# Patient Record
Sex: FEMALE | Race: OTHER | Employment: UNEMPLOYED | URBAN - METROPOLITAN AREA
[De-identification: names, ages, dates, MRNs, and addresses within clinical notes are randomized per-mention and may not be internally consistent; named-entity substitution may affect disease eponyms.]

---

## 2017-05-20 ENCOUNTER — HOSPITAL ENCOUNTER (OUTPATIENT)
Age: 57
Discharge: HOME | End: 2017-05-20

## 2017-05-20 DIAGNOSIS — I12.9 BENIGN HYPERTENSION WITH CKD (CHRONIC KIDNEY DISEASE) STAGE IV (HCC): ICD-10-CM

## 2017-05-20 DIAGNOSIS — N18.4 SECONDARY DIABETES MELLITUS WITH STAGE 4 CHRONIC KIDNEY DISEASE (HCC): ICD-10-CM

## 2017-05-20 DIAGNOSIS — E13.22 SECONDARY DIABETES MELLITUS WITH STAGE 4 CHRONIC KIDNEY DISEASE (HCC): ICD-10-CM

## 2017-05-20 DIAGNOSIS — N18.4 BENIGN HYPERTENSION WITH CKD (CHRONIC KIDNEY DISEASE) STAGE IV (HCC): ICD-10-CM

## 2017-05-20 DIAGNOSIS — N18.4 CKD (CHRONIC KIDNEY DISEASE) STAGE 4, GFR 15-29 ML/MIN (HCC): ICD-10-CM

## 2017-05-20 DIAGNOSIS — R82.998 LEUKOCYTES IN URINE: ICD-10-CM

## 2017-05-20 LAB
-: ABNORMAL
ABSOLUTE EOS #: 0.2 K/UL (ref 0–0.4)
ABSOLUTE LYMPH #: 4 K/UL (ref 1–4.8)
ABSOLUTE MONO #: 0.8 K/UL (ref 0.1–1.3)
AMORPHOUS: ABNORMAL
ANION GAP SERPL CALCULATED.3IONS-SCNC: 16 MMOL/L (ref 9–17)
BACTERIA: ABNORMAL
BASOPHILS # BLD: 0 %
BASOPHILS ABSOLUTE: 0 K/UL (ref 0–0.2)
BILIRUBIN URINE: NEGATIVE
BUN BLDV-MCNC: 48 MG/DL (ref 6–20)
BUN/CREAT BLD: ABNORMAL (ref 9–20)
CALCIUM SERPL-MCNC: 8.6 MG/DL (ref 8.6–10.4)
CASTS UA: ABNORMAL /LPF
CHLORIDE BLD-SCNC: 104 MMOL/L (ref 98–107)
CO2: 19 MMOL/L (ref 20–31)
COLOR: YELLOW
COMMENT UA: ABNORMAL
CREAT SERPL-MCNC: 1.43 MG/DL (ref 0.5–0.9)
CRYSTALS, UA: ABNORMAL /HPF
DIFFERENTIAL TYPE: ABNORMAL
EOSINOPHILS RELATIVE PERCENT: 3 %
EPITHELIAL CELLS UA: ABNORMAL /HPF
GFR AFRICAN AMERICAN: 46 ML/MIN
GFR NON-AFRICAN AMERICAN: 38 ML/MIN
GFR SERPL CREATININE-BSD FRML MDRD: ABNORMAL ML/MIN/{1.73_M2}
GFR SERPL CREATININE-BSD FRML MDRD: ABNORMAL ML/MIN/{1.73_M2}
GLUCOSE BLD-MCNC: 122 MG/DL (ref 70–99)
GLUCOSE URINE: NEGATIVE
HCT VFR BLD CALC: 36.8 % (ref 36–46)
HEMOGLOBIN: 11.9 G/DL (ref 12–16)
KETONES, URINE: NEGATIVE
LEUKOCYTE ESTERASE, URINE: NEGATIVE
LYMPHOCYTES # BLD: 55 %
MAGNESIUM: 1.4 MG/DL (ref 1.6–2.6)
MCH RBC QN AUTO: 28.4 PG (ref 26–34)
MCHC RBC AUTO-ENTMCNC: 32.4 G/DL (ref 31–37)
MCV RBC AUTO: 87.9 FL (ref 80–100)
MONOCYTES # BLD: 11 %
MUCUS: ABNORMAL
NITRITE, URINE: NEGATIVE
OTHER OBSERVATIONS UA: ABNORMAL
PDW BLD-RTO: 14.3 % (ref 11.5–14.9)
PH UA: 5.5 (ref 5–8)
PHOSPHORUS: 4.8 MG/DL (ref 2.6–4.5)
PLATELET # BLD: 302 K/UL (ref 150–450)
PLATELET ESTIMATE: ABNORMAL
PMV BLD AUTO: 9 FL (ref 6–12)
POTASSIUM SERPL-SCNC: 4.3 MMOL/L (ref 3.7–5.3)
PROTEIN UA: ABNORMAL
RBC # BLD: 4.19 M/UL (ref 4–5.2)
RBC # BLD: ABNORMAL 10*6/UL
RBC UA: ABNORMAL /HPF
RENAL EPITHELIAL, UA: ABNORMAL /HPF
SEG NEUTROPHILS: 31 %
SEGMENTED NEUTROPHILS ABSOLUTE COUNT: 2.3 K/UL (ref 1.3–9.1)
SODIUM BLD-SCNC: 139 MMOL/L (ref 135–144)
SPECIFIC GRAVITY UA: 1.01 (ref 1–1.03)
TRICHOMONAS: ABNORMAL
TURBIDITY: CLEAR
URINE HGB: NEGATIVE
UROBILINOGEN, URINE: NORMAL
WBC # BLD: 7.5 K/UL (ref 3.5–11)
WBC # BLD: ABNORMAL 10*3/UL
WBC UA: ABNORMAL /HPF
YEAST: ABNORMAL

## 2017-05-20 PROCEDURE — 85025 COMPLETE CBC W/AUTO DIFF WBC: CPT

## 2017-05-20 PROCEDURE — 83735 ASSAY OF MAGNESIUM: CPT

## 2017-05-20 PROCEDURE — 87086 URINE CULTURE/COLONY COUNT: CPT

## 2017-05-20 PROCEDURE — 84100 ASSAY OF PHOSPHORUS: CPT

## 2017-05-20 PROCEDURE — 81001 URINALYSIS AUTO W/SCOPE: CPT

## 2017-05-20 PROCEDURE — 36415 COLL VENOUS BLD VENIPUNCTURE: CPT

## 2017-05-20 PROCEDURE — 80048 BASIC METABOLIC PNL TOTAL CA: CPT

## 2017-05-21 LAB
CULTURE: NORMAL
CULTURE: NORMAL
Lab: NORMAL
SPECIMEN DESCRIPTION: NORMAL
SPECIMEN DESCRIPTION: NORMAL
STATUS: NORMAL

## 2017-08-04 ENCOUNTER — HOSPITAL ENCOUNTER (OUTPATIENT)
Age: 57
Discharge: HOME | End: 2017-08-04
Payer: COMMERCIAL

## 2017-08-04 DIAGNOSIS — E55.9 VITAMIN D DEFICIENCY: ICD-10-CM

## 2017-08-04 LAB — VITAMIN D 25-HYDROXY: 74.1 NG/ML (ref 30–100)

## 2017-08-04 PROCEDURE — 36415 COLL VENOUS BLD VENIPUNCTURE: CPT

## 2017-08-04 PROCEDURE — 82306 VITAMIN D 25 HYDROXY: CPT

## 2017-11-19 ENCOUNTER — HOSPITAL ENCOUNTER (EMERGENCY)
Age: 57
Discharge: HOME | End: 2017-11-19
Attending: EMERGENCY MEDICINE
Payer: COMMERCIAL

## 2017-11-19 VITALS
WEIGHT: 130 LBS | HEIGHT: 63 IN | RESPIRATION RATE: 21 BRPM | OXYGEN SATURATION: 94 % | HEART RATE: 82 BPM | DIASTOLIC BLOOD PRESSURE: 70 MMHG | BODY MASS INDEX: 23.04 KG/M2 | SYSTOLIC BLOOD PRESSURE: 147 MMHG | TEMPERATURE: 97.7 F

## 2017-11-19 DIAGNOSIS — R73.9 HYPERGLYCEMIA: ICD-10-CM

## 2017-11-19 DIAGNOSIS — E87.5 HYPERKALEMIA: Primary | ICD-10-CM

## 2017-11-19 LAB
ANION GAP SERPL CALCULATED.3IONS-SCNC: 13 MMOL/L (ref 9–17)
BUN BLDV-MCNC: 57 MG/DL (ref 6–20)
BUN/CREAT BLD: ABNORMAL (ref 9–20)
CALCIUM SERPL-MCNC: 8.8 MG/DL (ref 8.6–10.4)
CHLORIDE BLD-SCNC: 105 MMOL/L (ref 98–107)
CO2: 19 MMOL/L (ref 20–31)
CREAT SERPL-MCNC: 1.73 MG/DL (ref 0.5–0.9)
GFR AFRICAN AMERICAN: 37 ML/MIN
GFR NON-AFRICAN AMERICAN: 30 ML/MIN
GFR SERPL CREATININE-BSD FRML MDRD: ABNORMAL ML/MIN/{1.73_M2}
GFR SERPL CREATININE-BSD FRML MDRD: ABNORMAL ML/MIN/{1.73_M2}
GLUCOSE BLD-MCNC: 295 MG/DL (ref 70–99)
POTASSIUM SERPL-SCNC: 5.7 MMOL/L (ref 3.7–5.3)
SODIUM BLD-SCNC: 137 MMOL/L (ref 135–144)

## 2017-11-19 PROCEDURE — 36415 COLL VENOUS BLD VENIPUNCTURE: CPT

## 2017-11-19 PROCEDURE — 2580000003 HC RX 258: Performed by: EMERGENCY MEDICINE

## 2017-11-19 PROCEDURE — 80048 BASIC METABOLIC PNL TOTAL CA: CPT

## 2017-11-19 PROCEDURE — 6370000000 HC RX 637 (ALT 250 FOR IP): Performed by: EMERGENCY MEDICINE

## 2017-11-19 PROCEDURE — 99285 EMERGENCY DEPT VISIT HI MDM: CPT

## 2017-11-19 PROCEDURE — 96374 THER/PROPH/DIAG INJ IV PUSH: CPT

## 2017-11-19 RX ORDER — DEXTROSE MONOHYDRATE 25 G/50ML
25 INJECTION, SOLUTION INTRAVENOUS ONCE
Status: COMPLETED | OUTPATIENT
Start: 2017-11-19 | End: 2017-11-19

## 2017-11-19 RX ORDER — DEXTROSE, SODIUM CHLORIDE, AND POTASSIUM CHLORIDE 5; .45; .15 G/100ML; G/100ML; G/100ML
INJECTION INTRAVENOUS ONCE
Status: DISCONTINUED | OUTPATIENT
Start: 2017-11-19 | End: 2017-11-19

## 2017-11-19 RX ORDER — SODIUM POLYSTYRENE SULFONATE 15 G/60ML
15 SUSPENSION ORAL; RECTAL ONCE
Status: COMPLETED | OUTPATIENT
Start: 2017-11-19 | End: 2017-11-19

## 2017-11-19 RX ADMIN — INSULIN HUMAN 10 UNITS: 100 INJECTION, SOLUTION PARENTERAL at 14:50

## 2017-11-19 RX ADMIN — DEXTROSE MONOHYDRATE 25 G: 25 INJECTION, SOLUTION INTRAVENOUS at 14:51

## 2017-11-19 RX ADMIN — SODIUM POLYSTYRENE SULFONATE 15 G: 15 SUSPENSION ORAL; RECTAL at 14:56

## 2017-11-19 ASSESSMENT — ENCOUNTER SYMPTOMS
COLOR CHANGE: 0
EYE REDNESS: 0
RHINORRHEA: 0
VOMITING: 0
BACK PAIN: 0
WHEEZING: 0
TROUBLE SWALLOWING: 0
SHORTNESS OF BREATH: 0
SORE THROAT: 0
ABDOMINAL PAIN: 0
SINUS PRESSURE: 0
COUGH: 0
CONSTIPATION: 0
EYE PAIN: 0
FACIAL SWELLING: 0
CHEST TIGHTNESS: 0
DIARRHEA: 0
EYE DISCHARGE: 0
NAUSEA: 0
BLOOD IN STOOL: 0

## 2017-11-19 NOTE — ED NOTES
Dr. Joe Horne notified labs are back for pt. tx for hyperkalemia will be administered and no EKG will need to be done at this time.       Sherrell Moseley RN  11/19/17 4337

## 2017-11-19 NOTE — ED PROVIDER NOTES
[address] ED  eMERGENCY dEPARTMENT eNCOUnter      Pt Name: Arleta Kehr  MRN: [id number]  [place] 1960  Date of evaluation: 11/19/17      CHIEF COMPLAINT       Chief Complaint   Patient presents with    Abnormal Lab         HISTORY OF PRESENT ILLNESS    Arlene Robbins is a 61 y.o. female who presents complaining of Needing blood work. Patient speaks Estonian and signed in stating that she had high potassium. Patient was brought back to the room and we got the  on the phone. After talking through the  it sounds like the patient was here just to get blood work drawn per her nephrologist for an upcoming appointment. Patient has a history of high potassium and some acute renal failure in the past.  Patient currently states that she's having no symptoms. REVIEW OF SYSTEMS       Review of Systems   Constitutional: Negative for activity change, appetite change, chills, diaphoresis and fever. HENT: Negative for congestion, ear pain, facial swelling, nosebleeds, rhinorrhea, sinus pressure, sore throat and trouble swallowing. Eyes: Negative for pain, discharge and redness. Respiratory: Negative for cough, chest tightness, shortness of breath and wheezing. Cardiovascular: Negative for chest pain, palpitations and leg swelling. Gastrointestinal: Negative for abdominal pain, blood in stool, constipation, diarrhea, nausea and vomiting. Genitourinary: Negative for difficulty urinating, dysuria, flank pain, frequency, genital sores and hematuria. Musculoskeletal: Negative for arthralgias, back pain, gait problem, joint swelling, myalgias and neck pain. Skin: Negative for color change, pallor, rash and wound. Neurological: Negative for dizziness, tremors, seizures, syncope, speech difficulty, weakness, numbness and headaches.    Psychiatric/Behavioral: Negative for confusion, decreased concentration, hallucinations, self-injury, sleep disturbance and suicidal ideas. PAST MEDICAL HISTORY     Past Medical History:   Diagnosis Date    CAD (coronary artery disease)     [facility]congest[facility] failure) [facility])     CKD (chronic kidney disease) stage 3, GFR 30-59 ml/min     Diabetes mellitus ([facility])     Hyperkalemia     Hypertension     Microscopic hematuria     Proteinuria     Renal failure     Status post coronary artery bypass grafting 2010    Type II or unspecified type diabetes mellitus without mention of complication, not stated as uncontrolled     UTI (urinary tract infection)        SURGICAL HISTORY       Past Surgical History:   Procedure Laterality Date    CARDIAC SURGERY      CHOLECYSTECTOMY      CORONARY ARTERY BYPASS GRAFT         CURRENT MEDICATIONS       Current Discharge Medication List      CONTINUE these medications which have NOT CHANGED    Details   ferrous sulfate 325 (65 Fe) MG tablet Take 325 mg by mouth daily (with breakfast)      ramipril (ALTACE) 5 MG capsule Take 5 mg by mouth daily      carvedilol (COREG) 3.125 MG tablet Take 1 tablet by mouth 2 times daily  Qty: 60 tablet, Refills: 3      magnesium oxide (MAG-OX) 400 (240 MG) MG tablet Take 1 tablet by mouth daily  Qty: 30 tablet, Refills: 6      sodium polystyrene (KIONEX) 15 GM/60ML suspension TAKE 120MLS BY MOUTH EVERY 30 DAYS  Qty: 120 mL, Refills: 3      pravastatin (PRAVACHOL) 20 MG tablet Take 20 mg by mouth nightly      insulin glargine (LANTUS) 100 UNIT/ML injection vial Inject 30 Units into the skin 2 times daily       vitamin D (CHOLECALCIFEROL) 400 UNITS TABS tablet Take 400 Units by mouth daily      metoclopramide (REGLAN) 5 MG tablet   Take 5 mg by mouth 2 times daily (with meals)       glucose blood VI test strips (ASCENSIA AUTODISC VI;ONE TOUCH ULTRA TEST VI) strip As needed. Qty: 102 strip, Refills: 11    Comments: Accu-Chk compact STP      polyethylene glycol (MIRALAX) powder Take 17 g by mouth daily.   Qty: 30 Bottle, Refills: 3    Associated Diagnoses: CKD (chronic kidney disease) stage 3, GFR 30-59 ml/min      sennosides-docusate sodium (SENOKOT-S) 8.6-50 MG tablet Take 1 tablet by mouth daily. aspirin 81 MG EC tablet Take 81 mg by mouth daily. ALLERGIES     has No Known Allergies. SOCIAL HISTORY      reports that she has never smoked. She does not have any smokeless tobacco history on file. She reports that she does not drink alcohol or use drugs. PHYSICAL EXAM     INITIAL VITALS: BP (!) 155/61   Pulse 76   Temp 97.7 °F (36.5 °C) (Oral)   Resp 20   Ht 5' 3\" (1.6 m)   Wt 130 lb (59 kg)   SpO2 96%   BMI 23.03 kg/m²      Physical Exam   Constitutional: She is oriented to person, place, and time. She appears well-developed and well-nourished. No distress. HENT:   Head: Normocephalic and atraumatic. Eyes: Conjunctivae and EOM are normal. Pupils are equal, round, and reactive to light. Right eye exhibits no discharge. Left eye exhibits no discharge. No scleral icterus. Cardiovascular: Normal rate, regular rhythm and normal heart sounds. Exam reveals no gallop and no friction rub. No murmur heard. Pulmonary/Chest: Effort normal and breath sounds normal. No respiratory distress. She has no wheezes. She has no rales. She exhibits no tenderness. Abdominal: Soft. Bowel sounds are normal. She exhibits no distension and no mass. There is no tenderness. There is no rebound and no guarding. Musculoskeletal: Normal range of motion. She exhibits no edema or tenderness. Neurological: She is alert and oriented to person, place, and time. She displays normal reflexes. No cranial nerve deficit. She exhibits normal muscle tone. Coordination normal.   Skin: Skin is warm and dry. No rash noted. She is not diaphoretic. No erythema. No pallor. Psychiatric: She has a normal mood and affect. Her behavior is normal. Judgment and thought content normal.   Nursing note and vitals reviewed.       DIAGNOSTIC RESULTS     RADIOLOGY:All plain film,

## 2017-11-27 ENCOUNTER — HOSPITAL ENCOUNTER (OUTPATIENT)
Age: 57
Discharge: HOME | End: 2017-11-27
Payer: COMMERCIAL

## 2017-11-27 DIAGNOSIS — N18.30 BENIGN HYPERTENSION WITH CKD (CHRONIC KIDNEY DISEASE) STAGE III (HCC): ICD-10-CM

## 2017-11-27 DIAGNOSIS — E13.22 SECONDARY DIABETES MELLITUS WITH STAGE 3 CHRONIC KIDNEY DISEASE (HCC): ICD-10-CM

## 2017-11-27 DIAGNOSIS — R82.998 LEUKOCYTES IN URINE: ICD-10-CM

## 2017-11-27 DIAGNOSIS — N18.30 CKD (CHRONIC KIDNEY DISEASE) STAGE 3, GFR 30-59 ML/MIN (HCC): ICD-10-CM

## 2017-11-27 DIAGNOSIS — N18.30 SECONDARY DIABETES MELLITUS WITH STAGE 3 CHRONIC KIDNEY DISEASE (HCC): ICD-10-CM

## 2017-11-27 DIAGNOSIS — R80.9 PROTEINURIA, UNSPECIFIED TYPE: ICD-10-CM

## 2017-11-27 DIAGNOSIS — I12.9 BENIGN HYPERTENSION WITH CKD (CHRONIC KIDNEY DISEASE) STAGE III (HCC): ICD-10-CM

## 2017-11-27 LAB
-: ABNORMAL
ABSOLUTE EOS #: 0.29 K/UL (ref 0–0.4)
ABSOLUTE IMMATURE GRANULOCYTE: ABNORMAL K/UL (ref 0–0.3)
ABSOLUTE LYMPH #: 2.55 K/UL (ref 1–4.8)
ABSOLUTE MONO #: 0.7 K/UL (ref 0.1–1.3)
AMORPHOUS: ABNORMAL
ANION GAP SERPL CALCULATED.3IONS-SCNC: 12 MMOL/L (ref 9–17)
ATYPICAL LYMPHOCYTE ABSOLUTE COUNT: 0.23 K/UL
ATYPICAL LYMPHOCYTES: 4 %
BACTERIA: ABNORMAL
BASOPHILS # BLD: 0 % (ref 0–2)
BASOPHILS ABSOLUTE: 0 K/UL (ref 0–0.2)
BUN BLDV-MCNC: 55 MG/DL (ref 6–20)
BUN/CREAT BLD: ABNORMAL (ref 9–20)
CALCIUM SERPL-MCNC: 9.1 MG/DL (ref 8.6–10.4)
CASTS UA: ABNORMAL /LPF
CHLORIDE BLD-SCNC: 106 MMOL/L (ref 98–107)
CO2: 24 MMOL/L (ref 20–31)
CREAT SERPL-MCNC: 1.9 MG/DL (ref 0.5–0.9)
CRYSTALS, UA: ABNORMAL /HPF
DIFFERENTIAL TYPE: ABNORMAL
EOSINOPHILS RELATIVE PERCENT: 5 % (ref 0–4)
EPITHELIAL CELLS UA: ABNORMAL /HPF
GFR AFRICAN AMERICAN: 33 ML/MIN
GFR NON-AFRICAN AMERICAN: 27 ML/MIN
GFR SERPL CREATININE-BSD FRML MDRD: ABNORMAL ML/MIN/{1.73_M2}
GFR SERPL CREATININE-BSD FRML MDRD: ABNORMAL ML/MIN/{1.73_M2}
GLUCOSE BLD-MCNC: 159 MG/DL (ref 70–99)
HCT VFR BLD CALC: 38 % (ref 36–46)
HEMOGLOBIN: 12.6 G/DL (ref 12–16)
IMMATURE GRANULOCYTES: ABNORMAL %
LYMPHOCYTES # BLD: 44 % (ref 24–44)
MAGNESIUM: 2.2 MG/DL (ref 1.6–2.6)
MCH RBC QN AUTO: 29.4 PG (ref 26–34)
MCHC RBC AUTO-ENTMCNC: 33 G/DL (ref 31–37)
MCV RBC AUTO: 89.1 FL (ref 80–100)
MONOCYTES # BLD: 12 % (ref 1–7)
MORPHOLOGY: NORMAL
MUCUS: ABNORMAL
OTHER OBSERVATIONS UA: ABNORMAL
PDW BLD-RTO: 13.3 % (ref 11.5–14.9)
PHOSPHORUS: 5 MG/DL (ref 2.6–4.5)
PLATELET # BLD: 266 K/UL (ref 150–450)
PLATELET ESTIMATE: ABNORMAL
PMV BLD AUTO: 9.2 FL (ref 6–12)
POTASSIUM SERPL-SCNC: 5 MMOL/L (ref 3.7–5.3)
RBC # BLD: 4.27 M/UL (ref 4–5.2)
RBC # BLD: ABNORMAL 10*6/UL
RBC UA: ABNORMAL /HPF
RENAL EPITHELIAL, UA: ABNORMAL /HPF
SEG NEUTROPHILS: 35 % (ref 36–66)
SEGMENTED NEUTROPHILS ABSOLUTE COUNT: 2.03 K/UL (ref 1.3–9.1)
SODIUM BLD-SCNC: 142 MMOL/L (ref 135–144)
TRICHOMONAS: ABNORMAL
WBC # BLD: 5.8 K/UL (ref 3.5–11)
WBC # BLD: ABNORMAL 10*3/UL
WBC UA: ABNORMAL /HPF
YEAST: ABNORMAL

## 2017-11-27 PROCEDURE — 83735 ASSAY OF MAGNESIUM: CPT

## 2017-11-27 PROCEDURE — 85025 COMPLETE CBC W/AUTO DIFF WBC: CPT

## 2017-11-27 PROCEDURE — 84100 ASSAY OF PHOSPHORUS: CPT

## 2017-11-27 PROCEDURE — 80048 BASIC METABOLIC PNL TOTAL CA: CPT

## 2017-11-27 PROCEDURE — 81001 URINALYSIS AUTO W/SCOPE: CPT

## 2017-11-27 PROCEDURE — 87086 URINE CULTURE/COLONY COUNT: CPT

## 2017-11-27 PROCEDURE — 82306 VITAMIN D 25 HYDROXY: CPT

## 2017-11-27 PROCEDURE — 36415 COLL VENOUS BLD VENIPUNCTURE: CPT

## 2017-11-28 LAB
CULTURE: NO GROWTH
CULTURE: NORMAL
Lab: NORMAL
SPECIMEN DESCRIPTION: NORMAL
SPECIMEN DESCRIPTION: NORMAL
STATUS: NORMAL
VITAMIN D 25-HYDROXY: 93 NG/ML (ref 30–100)

## 2018-02-26 ENCOUNTER — HOSPITAL ENCOUNTER (OUTPATIENT)
Age: 58
Discharge: HOME | End: 2018-02-26
Payer: COMMERCIAL

## 2018-02-26 DIAGNOSIS — R80.8 OTHER PROTEINURIA: ICD-10-CM

## 2018-02-26 DIAGNOSIS — E13.22 SECONDARY DIABETES MELLITUS WITH STAGE 3 CHRONIC KIDNEY DISEASE (GFR 30-59) (HCC): ICD-10-CM

## 2018-02-26 DIAGNOSIS — N18.30 SECONDARY DIABETES MELLITUS WITH STAGE 3 CHRONIC KIDNEY DISEASE (GFR 30-59) (HCC): ICD-10-CM

## 2018-02-26 DIAGNOSIS — E55.9 VITAMIN D DEFICIENCY: ICD-10-CM

## 2018-02-26 DIAGNOSIS — I12.9 BENIGN HYPERTENSION WITH CKD (CHRONIC KIDNEY DISEASE) STAGE III (HCC): ICD-10-CM

## 2018-02-26 DIAGNOSIS — N18.30 CKD (CHRONIC KIDNEY DISEASE) STAGE 3, GFR 30-59 ML/MIN (HCC): ICD-10-CM

## 2018-02-26 DIAGNOSIS — N18.30 BENIGN HYPERTENSION WITH CKD (CHRONIC KIDNEY DISEASE) STAGE III (HCC): ICD-10-CM

## 2018-02-26 LAB
ABSOLUTE EOS #: 0.12 K/UL (ref 0–0.4)
ABSOLUTE IMMATURE GRANULOCYTE: ABNORMAL K/UL (ref 0–0.3)
ABSOLUTE LYMPH #: 3.41 K/UL (ref 1–4.8)
ABSOLUTE MONO #: 0.81 K/UL (ref 0.1–1.3)
ANION GAP SERPL CALCULATED.3IONS-SCNC: 11 MMOL/L (ref 9–17)
BASOPHILS # BLD: 0 % (ref 0–2)
BASOPHILS ABSOLUTE: 0 K/UL (ref 0–0.2)
BUN BLDV-MCNC: 55 MG/DL (ref 6–20)
BUN/CREAT BLD: ABNORMAL (ref 9–20)
CALCIUM SERPL-MCNC: 9.6 MG/DL (ref 8.6–10.4)
CHLORIDE BLD-SCNC: 104 MMOL/L (ref 98–107)
CO2: 28 MMOL/L (ref 20–31)
CREAT SERPL-MCNC: 1.95 MG/DL (ref 0.5–0.9)
DIFFERENTIAL TYPE: ABNORMAL
EOSINOPHILS RELATIVE PERCENT: 2 % (ref 0–4)
GFR AFRICAN AMERICAN: 32 ML/MIN
GFR NON-AFRICAN AMERICAN: 26 ML/MIN
GFR SERPL CREATININE-BSD FRML MDRD: ABNORMAL ML/MIN/{1.73_M2}
GFR SERPL CREATININE-BSD FRML MDRD: ABNORMAL ML/MIN/{1.73_M2}
GLUCOSE BLD-MCNC: 126 MG/DL (ref 70–99)
HCT VFR BLD CALC: 38.3 % (ref 36–46)
HEMOGLOBIN: 12.5 G/DL (ref 12–16)
IMMATURE GRANULOCYTES: ABNORMAL %
LYMPHOCYTES # BLD: 55 % (ref 24–44)
MAGNESIUM: 2.6 MG/DL (ref 1.6–2.6)
MCH RBC QN AUTO: 28.5 PG (ref 26–34)
MCHC RBC AUTO-ENTMCNC: 32.6 G/DL (ref 31–37)
MCV RBC AUTO: 87.5 FL (ref 80–100)
MONOCYTES # BLD: 13 % (ref 1–7)
MORPHOLOGY: NORMAL
NRBC AUTOMATED: ABNORMAL PER 100 WBC
PDW BLD-RTO: 13.3 % (ref 11.5–14.9)
PHOSPHORUS: 4.5 MG/DL (ref 2.6–4.5)
PLATELET # BLD: 279 K/UL (ref 150–450)
PLATELET ESTIMATE: ABNORMAL
PMV BLD AUTO: 9 FL (ref 6–12)
POTASSIUM SERPL-SCNC: 5.1 MMOL/L (ref 3.7–5.3)
RBC # BLD: 4.38 M/UL (ref 4–5.2)
RBC # BLD: ABNORMAL 10*6/UL
SEG NEUTROPHILS: 30 % (ref 36–66)
SEGMENTED NEUTROPHILS ABSOLUTE COUNT: 1.86 K/UL (ref 1.3–9.1)
SODIUM BLD-SCNC: 143 MMOL/L (ref 135–144)
VITAMIN D 25-HYDROXY: 98.1 NG/ML (ref 30–100)
WBC # BLD: 6.2 K/UL (ref 3.5–11)
WBC # BLD: ABNORMAL 10*3/UL

## 2018-02-26 PROCEDURE — 36415 COLL VENOUS BLD VENIPUNCTURE: CPT

## 2018-02-26 PROCEDURE — 84100 ASSAY OF PHOSPHORUS: CPT

## 2018-02-26 PROCEDURE — 83735 ASSAY OF MAGNESIUM: CPT

## 2018-02-26 PROCEDURE — 85025 COMPLETE CBC W/AUTO DIFF WBC: CPT

## 2018-02-26 PROCEDURE — 82306 VITAMIN D 25 HYDROXY: CPT

## 2018-02-26 PROCEDURE — 80048 BASIC METABOLIC PNL TOTAL CA: CPT

## 2018-10-12 ENCOUNTER — HOSPITAL ENCOUNTER (OUTPATIENT)
Age: 58
Setting detail: SPECIMEN
Discharge: HOME | End: 2018-10-12
Payer: COMMERCIAL

## 2018-10-12 LAB
-: ABNORMAL
AMORPHOUS: ABNORMAL
BACTERIA: ABNORMAL
BILIRUBIN URINE: NEGATIVE
CASTS UA: ABNORMAL /LPF
COLOR: YELLOW
COMMENT UA: ABNORMAL
CRYSTALS, UA: ABNORMAL /HPF
EPITHELIAL CELLS UA: ABNORMAL /HPF
GLUCOSE URINE: NEGATIVE
KETONES, URINE: NEGATIVE
LEUKOCYTE ESTERASE, URINE: ABNORMAL
MUCUS: ABNORMAL
NITRITE, URINE: NEGATIVE
OTHER OBSERVATIONS UA: ABNORMAL
PH UA: 5 (ref 5–8)
PROTEIN UA: ABNORMAL
RBC UA: ABNORMAL /HPF
RENAL EPITHELIAL, UA: ABNORMAL /HPF
SPECIFIC GRAVITY UA: 1.01 (ref 1–1.03)
TRICHOMONAS: ABNORMAL
TURBIDITY: ABNORMAL
URINE HGB: NEGATIVE
UROBILINOGEN, URINE: NORMAL
WBC UA: ABNORMAL /HPF
YEAST: ABNORMAL

## 2018-10-12 PROCEDURE — 87186 SC STD MICRODIL/AGAR DIL: CPT

## 2018-10-12 PROCEDURE — 87086 URINE CULTURE/COLONY COUNT: CPT

## 2018-10-12 PROCEDURE — 81001 URINALYSIS AUTO W/SCOPE: CPT

## 2018-10-12 PROCEDURE — 87077 CULTURE AEROBIC IDENTIFY: CPT

## 2018-10-12 PROCEDURE — 86403 PARTICLE AGGLUT ANTBDY SCRN: CPT

## 2018-10-14 LAB
CULTURE: ABNORMAL
Lab: ABNORMAL
ORGANISM: ABNORMAL
SPECIMEN DESCRIPTION: ABNORMAL
STATUS: ABNORMAL

## 2019-03-27 ENCOUNTER — HOSPITAL ENCOUNTER (INPATIENT)
Age: 59
LOS: 1 days | Discharge: HOME | DRG: 420 | End: 2019-03-27
Attending: EMERGENCY MEDICINE | Admitting: INTERNAL MEDICINE
Payer: COMMERCIAL

## 2019-03-27 ENCOUNTER — APPOINTMENT (OUTPATIENT)
Dept: GENERAL RADIOLOGY | Age: 59
DRG: 420 | End: 2019-03-27
Payer: COMMERCIAL

## 2019-03-27 VITALS
HEART RATE: 99 BPM | DIASTOLIC BLOOD PRESSURE: 73 MMHG | WEIGHT: 127 LBS | SYSTOLIC BLOOD PRESSURE: 136 MMHG | OXYGEN SATURATION: 97 % | RESPIRATION RATE: 18 BRPM | TEMPERATURE: 98.3 F | BODY MASS INDEX: 22.5 KG/M2 | HEIGHT: 63 IN

## 2019-03-27 DIAGNOSIS — E11.43 TYPE 2 DIABETES MELLITUS WITH AUTONOMIC NEUROPATHY, UNSPECIFIED WHETHER LONG TERM INSULIN USE (HCC): ICD-10-CM

## 2019-03-27 DIAGNOSIS — N18.4 CKD (CHRONIC KIDNEY DISEASE) STAGE 4, GFR 15-29 ML/MIN (HCC): ICD-10-CM

## 2019-03-27 DIAGNOSIS — E87.5 HYPERKALEMIA: ICD-10-CM

## 2019-03-27 DIAGNOSIS — I15.2 HYPERTENSION DUE TO ENDOCRINE DISORDER: ICD-10-CM

## 2019-03-27 DIAGNOSIS — N17.9 ACUTE KIDNEY INJURY (HCC): ICD-10-CM

## 2019-03-27 DIAGNOSIS — R73.9 HYPERGLYCEMIA: Primary | ICD-10-CM

## 2019-03-27 LAB
-: ABNORMAL
ABSOLUTE EOS #: 0 K/UL (ref 0–0.4)
ABSOLUTE IMMATURE GRANULOCYTE: ABNORMAL K/UL (ref 0–0.3)
ABSOLUTE LYMPH #: 1.3 K/UL (ref 1–4.8)
ABSOLUTE MONO #: 0.3 K/UL (ref 0.1–1.3)
ALLEN TEST: ABNORMAL
AMORPHOUS: ABNORMAL
ANION GAP SERPL CALCULATED.3IONS-SCNC: 12 MMOL/L (ref 9–17)
ANION GAP SERPL CALCULATED.3IONS-SCNC: 12 MMOL/L (ref 9–17)
ANION GAP SERPL CALCULATED.3IONS-SCNC: 17 MMOL/L (ref 9–17)
BACTERIA: ABNORMAL
BASOPHILS # BLD: 0 % (ref 0–2)
BASOPHILS ABSOLUTE: 0 K/UL (ref 0–0.2)
BETA-HYDROXYBUTYRATE: 0.12 MMOL/L (ref 0.02–0.27)
BILIRUBIN URINE: NEGATIVE
BUN BLDV-MCNC: 69 MG/DL (ref 6–20)
BUN BLDV-MCNC: 74 MG/DL (ref 6–20)
BUN BLDV-MCNC: 83 MG/DL (ref 6–20)
BUN/CREAT BLD: ABNORMAL (ref 9–20)
CALCIUM SERPL-MCNC: 8.6 MG/DL (ref 8.6–10.4)
CALCIUM SERPL-MCNC: 9 MG/DL (ref 8.6–10.4)
CALCIUM SERPL-MCNC: 9.6 MG/DL (ref 8.6–10.4)
CARBOXYHEMOGLOBIN: 2.6 % (ref 0–5)
CASTS UA: ABNORMAL /LPF
CHLORIDE BLD-SCNC: 108 MMOL/L (ref 98–107)
CHLORIDE BLD-SCNC: 90 MMOL/L (ref 98–107)
CHLORIDE BLD-SCNC: 99 MMOL/L (ref 98–107)
CO2: 18 MMOL/L (ref 20–31)
CO2: 19 MMOL/L (ref 20–31)
CO2: 19 MMOL/L (ref 20–31)
COLOR: YELLOW
COMMENT UA: ABNORMAL
CREAT SERPL-MCNC: 1.83 MG/DL (ref 0.5–0.9)
CREAT SERPL-MCNC: 1.98 MG/DL (ref 0.5–0.9)
CREAT SERPL-MCNC: 2.47 MG/DL (ref 0.5–0.9)
CRYSTALS, UA: ABNORMAL /HPF
DIFFERENTIAL TYPE: ABNORMAL
EOSINOPHILS RELATIVE PERCENT: 0 % (ref 0–4)
EPITHELIAL CELLS UA: ABNORMAL /HPF
FIO2: ABNORMAL
GFR AFRICAN AMERICAN: 24 ML/MIN
GFR AFRICAN AMERICAN: 31 ML/MIN
GFR AFRICAN AMERICAN: 34 ML/MIN
GFR NON-AFRICAN AMERICAN: 20 ML/MIN
GFR NON-AFRICAN AMERICAN: 26 ML/MIN
GFR NON-AFRICAN AMERICAN: 28 ML/MIN
GFR SERPL CREATININE-BSD FRML MDRD: ABNORMAL ML/MIN/{1.73_M2}
GLUCOSE BLD-MCNC: 207 MG/DL
GLUCOSE BLD-MCNC: 207 MG/DL (ref 65–105)
GLUCOSE BLD-MCNC: 239 MG/DL
GLUCOSE BLD-MCNC: 239 MG/DL (ref 65–105)
GLUCOSE BLD-MCNC: 260 MG/DL (ref 65–105)
GLUCOSE BLD-MCNC: 285 MG/DL (ref 65–105)
GLUCOSE BLD-MCNC: 299 MG/DL (ref 70–99)
GLUCOSE BLD-MCNC: 422 MG/DL
GLUCOSE BLD-MCNC: 422 MG/DL (ref 65–105)
GLUCOSE BLD-MCNC: 442 MG/DL (ref 65–105)
GLUCOSE BLD-MCNC: 507 MG/DL (ref 70–99)
GLUCOSE BLD-MCNC: 516 MG/DL
GLUCOSE BLD-MCNC: 516 MG/DL (ref 65–105)
GLUCOSE BLD-MCNC: 640 MG/DL (ref 70–99)
GLUCOSE URINE: ABNORMAL
HCO3 VENOUS: 21.4 MMOL/L (ref 24–30)
HCT VFR BLD CALC: 40.6 % (ref 36–46)
HEMOGLOBIN: 13.3 G/DL (ref 12–16)
IMMATURE GRANULOCYTES: ABNORMAL %
KETONES, URINE: NEGATIVE
LEUKOCYTE ESTERASE, URINE: NEGATIVE
LYMPHOCYTES # BLD: 15 % (ref 24–44)
MCH RBC QN AUTO: 28.5 PG (ref 26–34)
MCHC RBC AUTO-ENTMCNC: 32.7 G/DL (ref 31–37)
MCV RBC AUTO: 87.3 FL (ref 80–100)
METHEMOGLOBIN: 0.5 % (ref 0–1.9)
MODE: ABNORMAL
MONOCYTES # BLD: 4 % (ref 1–7)
MUCUS: ABNORMAL
NEGATIVE BASE EXCESS, VEN: 5.1 MMOL/L (ref 0–2)
NITRITE, URINE: NEGATIVE
NOTIFICATION TIME: ABNORMAL
NOTIFICATION: ABNORMAL
NRBC AUTOMATED: ABNORMAL PER 100 WBC
O2 DEVICE/FLOW/%: ABNORMAL
O2 SAT, VEN: 74 % (ref 60–85)
OTHER OBSERVATIONS UA: ABNORMAL
OXYHEMOGLOBIN: ABNORMAL % (ref 95–98)
PATIENT TEMP: 37
PCO2, VEN, TEMP ADJ: ABNORMAL MMHG (ref 39–55)
PCO2, VEN: 43.4 (ref 39–55)
PDW BLD-RTO: 13.4 % (ref 11.5–14.9)
PEEP/CPAP: ABNORMAL
PH UA: 5.5 (ref 5–8)
PH VENOUS: 7.3 (ref 7.32–7.42)
PH, VEN, TEMP ADJ: ABNORMAL (ref 7.32–7.42)
PLATELET # BLD: 347 K/UL (ref 150–450)
PLATELET ESTIMATE: ABNORMAL
PMV BLD AUTO: 9.4 FL (ref 6–12)
PO2, VEN, TEMP ADJ: ABNORMAL MMHG (ref 30–50)
PO2, VEN: 44.3 (ref 30–50)
POSITIVE BASE EXCESS, VEN: ABNORMAL MMOL/L (ref 0–2)
POTASSIUM SERPL-SCNC: 5 MMOL/L (ref 3.7–5.3)
POTASSIUM SERPL-SCNC: 5.8 MMOL/L (ref 3.7–5.3)
POTASSIUM SERPL-SCNC: 5.8 MMOL/L (ref 3.7–5.3)
PROTEIN UA: ABNORMAL
PSV: ABNORMAL
PT. POSITION: ABNORMAL
RBC # BLD: 4.65 M/UL (ref 4–5.2)
RBC # BLD: ABNORMAL 10*6/UL
RBC UA: ABNORMAL /HPF
RENAL EPITHELIAL, UA: ABNORMAL /HPF
RESPIRATORY RATE: ABNORMAL
SAMPLE SITE: ABNORMAL
SEG NEUTROPHILS: 81 % (ref 36–66)
SEGMENTED NEUTROPHILS ABSOLUTE COUNT: 7 K/UL (ref 1.3–9.1)
SET RATE: ABNORMAL
SODIUM BLD-SCNC: 126 MMOL/L (ref 135–144)
SODIUM BLD-SCNC: 130 MMOL/L (ref 135–144)
SODIUM BLD-SCNC: 138 MMOL/L (ref 135–144)
SPECIFIC GRAVITY UA: 1.01 (ref 1–1.03)
TEXT FOR RESPIRATORY: ABNORMAL
TOTAL HB: ABNORMAL G/DL (ref 12–16)
TOTAL RATE: ABNORMAL
TRICHOMONAS: ABNORMAL
TURBIDITY: CLEAR
URINE HGB: NEGATIVE
UROBILINOGEN, URINE: NORMAL
VT: ABNORMAL
WBC # BLD: 8.6 K/UL (ref 3.5–11)
WBC # BLD: ABNORMAL 10*3/UL
WBC UA: ABNORMAL /HPF
YEAST: ABNORMAL

## 2019-03-27 PROCEDURE — 96366 THER/PROPH/DIAG IV INF ADDON: CPT

## 2019-03-27 PROCEDURE — 6370000000 HC RX 637 (ALT 250 FOR IP): Performed by: INTERNAL MEDICINE

## 2019-03-27 PROCEDURE — 82010 KETONE BODYS QUAN: CPT

## 2019-03-27 PROCEDURE — 82800 BLOOD PH: CPT

## 2019-03-27 PROCEDURE — 94760 N-INVAS EAR/PLS OXIMETRY 1: CPT

## 2019-03-27 PROCEDURE — 96372 THER/PROPH/DIAG INJ SC/IM: CPT

## 2019-03-27 PROCEDURE — 6360000002 HC RX W HCPCS: Performed by: NURSE PRACTITIONER

## 2019-03-27 PROCEDURE — 2060000000 HC ICU INTERMEDIATE R&B

## 2019-03-27 PROCEDURE — 6370000000 HC RX 637 (ALT 250 FOR IP): Performed by: EMERGENCY MEDICINE

## 2019-03-27 PROCEDURE — 2580000003 HC RX 258: Performed by: NURSE PRACTITIONER

## 2019-03-27 PROCEDURE — 96365 THER/PROPH/DIAG IV INF INIT: CPT

## 2019-03-27 PROCEDURE — 85025 COMPLETE CBC W/AUTO DIFF WBC: CPT

## 2019-03-27 PROCEDURE — 96374 THER/PROPH/DIAG INJ IV PUSH: CPT

## 2019-03-27 PROCEDURE — 96361 HYDRATE IV INFUSION ADD-ON: CPT

## 2019-03-27 PROCEDURE — G0378 HOSPITAL OBSERVATION PER HR: HCPCS

## 2019-03-27 PROCEDURE — 87086 URINE CULTURE/COLONY COUNT: CPT

## 2019-03-27 PROCEDURE — 6370000000 HC RX 637 (ALT 250 FOR IP): Performed by: NURSE PRACTITIONER

## 2019-03-27 PROCEDURE — 2580000003 HC RX 258: Performed by: INTERNAL MEDICINE

## 2019-03-27 PROCEDURE — 82805 BLOOD GASES W/O2 SATURATION: CPT

## 2019-03-27 PROCEDURE — 93005 ELECTROCARDIOGRAM TRACING: CPT

## 2019-03-27 PROCEDURE — 99285 EMERGENCY DEPT VISIT HI MDM: CPT

## 2019-03-27 PROCEDURE — 36415 COLL VENOUS BLD VENIPUNCTURE: CPT

## 2019-03-27 PROCEDURE — 99236 HOSP IP/OBS SAME DATE HI 85: CPT | Performed by: INTERNAL MEDICINE

## 2019-03-27 PROCEDURE — 71045 X-RAY EXAM CHEST 1 VIEW: CPT

## 2019-03-27 PROCEDURE — 82947 ASSAY GLUCOSE BLOOD QUANT: CPT

## 2019-03-27 PROCEDURE — 2580000003 HC RX 258: Performed by: EMERGENCY MEDICINE

## 2019-03-27 PROCEDURE — 81001 URINALYSIS AUTO W/SCOPE: CPT

## 2019-03-27 PROCEDURE — 80048 BASIC METABOLIC PNL TOTAL CA: CPT

## 2019-03-27 RX ORDER — INSULIN GLARGINE 100 [IU]/ML
40 INJECTION, SOLUTION SUBCUTANEOUS DAILY
Qty: 1 VIAL | Refills: 3 | COMMUNITY
Start: 2019-03-27 | End: 2019-05-31 | Stop reason: ALTCHOICE

## 2019-03-27 RX ORDER — RAMIPRIL 2.5 MG/1
2.5 CAPSULE ORAL DAILY
Status: DISCONTINUED | OUTPATIENT
Start: 2019-03-27 | End: 2019-03-27 | Stop reason: HOSPADM

## 2019-03-27 RX ORDER — CARVEDILOL 3.12 MG/1
3.12 TABLET ORAL ONCE
Status: COMPLETED | OUTPATIENT
Start: 2019-03-27 | End: 2019-03-27

## 2019-03-27 RX ORDER — ACETAMINOPHEN 325 MG/1
650 TABLET ORAL EVERY 4 HOURS PRN
Status: DISCONTINUED | OUTPATIENT
Start: 2019-03-27 | End: 2019-03-27 | Stop reason: HOSPADM

## 2019-03-27 RX ORDER — 0.9 % SODIUM CHLORIDE 0.9 %
1500 INTRAVENOUS SOLUTION INTRAVENOUS ONCE
Status: DISCONTINUED | OUTPATIENT
Start: 2019-03-27 | End: 2019-03-27 | Stop reason: HOSPADM

## 2019-03-27 RX ORDER — NICOTINE POLACRILEX 4 MG
15 LOZENGE BUCCAL PRN
Status: DISCONTINUED | OUTPATIENT
Start: 2019-03-27 | End: 2019-03-27 | Stop reason: HOSPADM

## 2019-03-27 RX ORDER — SODIUM CHLORIDE 0.9 % (FLUSH) 0.9 %
10 SYRINGE (ML) INJECTION EVERY 12 HOURS SCHEDULED
Status: DISCONTINUED | OUTPATIENT
Start: 2019-03-27 | End: 2019-03-27 | Stop reason: HOSPADM

## 2019-03-27 RX ORDER — ONDANSETRON 2 MG/ML
4 INJECTION INTRAMUSCULAR; INTRAVENOUS EVERY 6 HOURS PRN
Status: DISCONTINUED | OUTPATIENT
Start: 2019-03-27 | End: 2019-03-27 | Stop reason: HOSPADM

## 2019-03-27 RX ORDER — DEXTROSE MONOHYDRATE 50 MG/ML
100 INJECTION, SOLUTION INTRAVENOUS PRN
Status: DISCONTINUED | OUTPATIENT
Start: 2019-03-27 | End: 2019-03-27 | Stop reason: HOSPADM

## 2019-03-27 RX ORDER — CARVEDILOL 3.12 MG/1
3.12 TABLET ORAL 2 TIMES DAILY
Status: DISCONTINUED | OUTPATIENT
Start: 2019-03-27 | End: 2019-03-27

## 2019-03-27 RX ORDER — DEXTROSE MONOHYDRATE 25 G/50ML
12.5 INJECTION, SOLUTION INTRAVENOUS PRN
Status: DISCONTINUED | OUTPATIENT
Start: 2019-03-27 | End: 2019-03-27 | Stop reason: HOSPADM

## 2019-03-27 RX ORDER — RAMIPRIL 2.5 MG/1
2.5 CAPSULE ORAL DAILY
Qty: 30 CAPSULE | Refills: 3 | Status: SHIPPED | OUTPATIENT
Start: 2019-03-28

## 2019-03-27 RX ORDER — SODIUM CHLORIDE 0.9 % (FLUSH) 0.9 %
10 SYRINGE (ML) INJECTION PRN
Status: DISCONTINUED | OUTPATIENT
Start: 2019-03-27 | End: 2019-03-27 | Stop reason: HOSPADM

## 2019-03-27 RX ORDER — ASPIRIN 81 MG/1
162 TABLET ORAL DAILY
Status: DISCONTINUED | OUTPATIENT
Start: 2019-03-27 | End: 2019-03-27 | Stop reason: HOSPADM

## 2019-03-27 RX ORDER — METOCLOPRAMIDE 5 MG/1
5 TABLET ORAL 2 TIMES DAILY WITH MEALS
Status: DISCONTINUED | OUTPATIENT
Start: 2019-03-27 | End: 2019-03-27 | Stop reason: HOSPADM

## 2019-03-27 RX ORDER — 0.9 % SODIUM CHLORIDE 0.9 %
1000 INTRAVENOUS SOLUTION INTRAVENOUS ONCE
Status: DISCONTINUED | OUTPATIENT
Start: 2019-03-27 | End: 2019-03-27

## 2019-03-27 RX ORDER — INSULIN GLARGINE 100 [IU]/ML
35 INJECTION, SOLUTION SUBCUTANEOUS DAILY
Qty: 1 VIAL | Refills: 3 | Status: SHIPPED | OUTPATIENT
Start: 2019-03-27 | End: 2019-03-27 | Stop reason: SDUPTHER

## 2019-03-27 RX ORDER — SODIUM POLYSTYRENE SULFONATE 4.1 MEQ/G
15 POWDER, FOR SUSPENSION ORAL; RECTAL ONCE
Status: DISCONTINUED | OUTPATIENT
Start: 2019-03-27 | End: 2019-03-27

## 2019-03-27 RX ORDER — 0.9 % SODIUM CHLORIDE 0.9 %
1000 INTRAVENOUS SOLUTION INTRAVENOUS ONCE
Status: COMPLETED | OUTPATIENT
Start: 2019-03-27 | End: 2019-03-27

## 2019-03-27 RX ORDER — INSULIN GLARGINE 100 [IU]/ML
35 INJECTION, SOLUTION SUBCUTANEOUS DAILY
Status: DISCONTINUED | OUTPATIENT
Start: 2019-03-27 | End: 2019-03-27 | Stop reason: HOSPADM

## 2019-03-27 RX ORDER — PRAVASTATIN SODIUM 20 MG
20 TABLET ORAL NIGHTLY
Status: DISCONTINUED | OUTPATIENT
Start: 2019-03-27 | End: 2019-03-27 | Stop reason: HOSPADM

## 2019-03-27 RX ORDER — SODIUM CHLORIDE 9 MG/ML
INJECTION, SOLUTION INTRAVENOUS CONTINUOUS
Status: DISCONTINUED | OUTPATIENT
Start: 2019-03-27 | End: 2019-03-27 | Stop reason: HOSPADM

## 2019-03-27 RX ORDER — CARVEDILOL 6.25 MG/1
6.25 TABLET ORAL 2 TIMES DAILY
Qty: 60 TABLET | Refills: 3 | Status: SHIPPED | OUTPATIENT
Start: 2019-03-27

## 2019-03-27 RX ORDER — SENNA AND DOCUSATE SODIUM 50; 8.6 MG/1; MG/1
1 TABLET, FILM COATED ORAL DAILY
Status: DISCONTINUED | OUTPATIENT
Start: 2019-03-27 | End: 2019-03-27 | Stop reason: HOSPADM

## 2019-03-27 RX ORDER — CARVEDILOL 6.25 MG/1
6.25 TABLET ORAL 2 TIMES DAILY
Status: DISCONTINUED | OUTPATIENT
Start: 2019-03-27 | End: 2019-03-27 | Stop reason: HOSPADM

## 2019-03-27 RX ORDER — POLYVINYL ALCOHOL 14 MG/ML
2 SOLUTION/ DROPS OPHTHALMIC NIGHTLY
Status: DISCONTINUED | OUTPATIENT
Start: 2019-03-27 | End: 2019-03-27 | Stop reason: HOSPADM

## 2019-03-27 RX ORDER — HYDRALAZINE HYDROCHLORIDE 20 MG/ML
10 INJECTION INTRAMUSCULAR; INTRAVENOUS EVERY 6 HOURS PRN
Status: DISCONTINUED | OUTPATIENT
Start: 2019-03-27 | End: 2019-03-27 | Stop reason: HOSPADM

## 2019-03-27 RX ADMIN — SODIUM CHLORIDE: 9 INJECTION, SOLUTION INTRAVENOUS at 02:02

## 2019-03-27 RX ADMIN — SODIUM CHLORIDE: 9 INJECTION, SOLUTION INTRAVENOUS at 14:06

## 2019-03-27 RX ADMIN — INSULIN GLARGINE 35 UNITS: 100 INJECTION, SOLUTION SUBCUTANEOUS at 10:41

## 2019-03-27 RX ADMIN — INSULIN LISPRO 6 UNITS: 100 INJECTION, SOLUTION INTRAVENOUS; SUBCUTANEOUS at 14:02

## 2019-03-27 RX ADMIN — METOCLOPRAMIDE HYDROCHLORIDE 5 MG: 5 TABLET ORAL at 16:54

## 2019-03-27 RX ADMIN — ENOXAPARIN SODIUM 30 MG: 30 INJECTION SUBCUTANEOUS at 10:46

## 2019-03-27 RX ADMIN — INSULIN HUMAN 10 UNITS: 100 INJECTION, SOLUTION PARENTERAL at 06:10

## 2019-03-27 RX ADMIN — METOCLOPRAMIDE HYDROCHLORIDE 5 MG: 5 TABLET ORAL at 10:42

## 2019-03-27 RX ADMIN — ASPIRIN 162 MG: 81 TABLET, COATED ORAL at 10:42

## 2019-03-27 RX ADMIN — SODIUM CHLORIDE 1000 ML: 9 INJECTION, SOLUTION INTRAVENOUS at 06:57

## 2019-03-27 RX ADMIN — CARVEDILOL 3.12 MG: 3.12 TABLET, FILM COATED ORAL at 08:49

## 2019-03-27 RX ADMIN — SODIUM CHLORIDE 1000 ML: 9 INJECTION, SOLUTION INTRAVENOUS at 16:52

## 2019-03-27 RX ADMIN — DOCUSATE SODIUM AND SENNOSIDES 1 TABLET: 8.6; 5 TABLET, FILM COATED ORAL at 14:02

## 2019-03-27 RX ADMIN — RAMIPRIL 2.5 MG: 2.5 CAPSULE ORAL at 15:18

## 2019-03-27 RX ADMIN — Medication 400 MG: at 14:02

## 2019-03-27 RX ADMIN — Medication 0.5 UNITS/HR: at 01:59

## 2019-03-27 RX ADMIN — CARVEDILOL 3.12 MG: 3.12 TABLET, FILM COATED ORAL at 15:18

## 2019-03-27 ASSESSMENT — ENCOUNTER SYMPTOMS
SORE THROAT: 0
COUGH: 1
RHINORRHEA: 1
VOMITING: 0
EYE PAIN: 0
ABDOMINAL PAIN: 0
DIARRHEA: 0
CONSTIPATION: 0
CHEST TIGHTNESS: 0
NAUSEA: 0
EYE REDNESS: 0
BACK PAIN: 0
SHORTNESS OF BREATH: 0

## 2019-03-27 NOTE — PROGRESS NOTES
Patient seen and examined in ED. Patient speaks [place]; [place]ic primary language. Information obtained using  phone. Patient presented to ED for hyperglycemia with blood sugar noted to be 640. Patient reports that her blood sugar has been over 500 for past few days. Reports compliance with insulin and diet. Upon reviewing medications that patient had in her possession, noted to have a prescription bottle with prednisone, which patient has been taking, along with Zithromax, for past week for respiratory symptoms, which are now resolved. Labs not indicative of DKA protocol. Insulin gtt initiated in ED at 0.5 units/hr shortly after arrival.  This am, blood sugar remained elevated and K+ 5.8.  10 units regular insulin administered IV. K+ dropped to 5.0 and blood sugar down to 285. Home dose Lantus ordered for this am, along with POCT testing and sliding scale coverage. Insulin gtt d/c'd. Patient to be admitted to PCU for hyperglycemia. Physical assessment negative. Denies pain and shortness of breath. Reports polydipsia and polyuria. Patient in no distress. Plan discussed with ED nursing staff. Admission orders in signed and held folder; awaiting admission.

## 2019-03-27 NOTE — CONSULTS
[facility])            Consult        Date of Admission:  3/27/2019  Date of Consultation:  3/27/2019      PCP:  Mavis Hernandez MD      Reason for the consult: Hypertension    History of Present Illness:  Arlene Braun is a 60 y.o. female  with known coronary artery disease prior coronary artery bypass grafting. The patient was admitted for severe hyperglycemia. She denies any chest pain. She had been having shortness of breath and coughing      PMH:   has a past medical history of CAD (coronary artery disease), CHF (congestive heart failure) ([address] CKD (chronic kidney disease) stage 3, GFR 30-59 ml/min ([address] Diabetes mellitus ([address] Hyperkalemia, Hypertension, Microscopic hematuria, Proteinuria, Renal failure, Status post coronary artery bypass grafting, Type II or unspecified type diabetes mellitus without mention of complication, not stated as uncontrolled, and UTI (urinary tract infection). PSH:   has a past surgical history that includes Cholecystectomy; Cardiac surgery; and Coronary artery bypass graft. Allergies: Allergies   Allergen Reactions    Ace Inhibitors      DECREAESES KIDNEY FUNCTION- ACE AND ARBS        Home Meds:    Prior to Admission medications    Medication Sig Start Date End Date Taking? Authorizing Provider   magnesium oxide (MAG-OX) 400 (240 MG) MG tablet Take 1 tablet by mouth daily 5/22/17  Yes Rashmi Stokes MD   pravastatin (PRAVACHOL) 20 MG tablet Take 20 mg by mouth nightly   Yes Historical Provider, MD   metoclopramide (REGLAN) 5 MG tablet   Take 5 mg by mouth 2 times daily (with meals)    Yes Historical Provider, MD   glucose blood VI test strips (ASCENSIA AUTODISC VI;ONE TOUCH ULTRA TEST VI) strip As needed. 10/9/12  Yes Jose De Jesus Mock MD   sennosides-docusate sodium (SENOKOT-S) 8.6-50 MG tablet Take 1 tablet by mouth daily.      Yes Historical Provider, MD   aspirin 81 MG EC tablet Take 162 mg by mouth daily    Yes Historical Provider, MD   sodium polystyrene (KIONEX) 15 GM/60ML suspension TAKE 120MLS BY MOUTH EVERY 30 DAYS 10/12/18   Sanjana Walters MD   Blood Pressure Monitor KIT TAKE BLOOD PRESSURE DAILY 10/12/18   Sanjana Walters MD   sodium polystyrene (SPS) 15 GM/60ML suspension Take 180 mLs by mouth once for 1 dose 9/25/18 9/25/18  Sanjana Walters MD   carvedilol (COREG) 25 MG tablet Take 3.125 mg by mouth 2 times daily     Historical Provider, MD   Dextrose, Diabetic Use, (GLUCOSE) 1 g CHEW Take by mouth    Historical Provider, MD   Propylene Glycol (SYSTANE BALANCE) 0.6 % SOLN Apply to eye    Historical Provider, MD   ferrous sulfate 325 (65 Fe) MG tablet Take 325 mg by mouth daily (with breakfast)    Historical Provider, MD   insulin glargine (LANTUS) 100 UNIT/ML injection vial Inject 35 Units into the skin daily     Historical Provider, MD   polyethylene glycol (MIRALAX) powder Take 17 g by mouth daily.  9/25/12   Sanjana Walters MD        Hospital Meds:    Current Facility-Administered Medications   Medication Dose Route Frequency Provider Last Rate Last Dose    glucose (GLUTOSE) 40 % oral gel 15 g  15 g Oral PRN Carolyn Balderas MD        dextrose 50 % solution 12.5 g  12.5 g Intravenous PRN Carolyn Balderas MD        glucagon (rDNA) injection 1 mg  1 mg Intramuscular PRN Carolyn Balderas MD        dextrose 5 % solution  100 mL/hr Intravenous PRN Carolyn Balderas MD        0.9 % sodium chloride infusion   Intravenous Continuous Carolyn Balderas  mL/hr at 03/27/19 0202      aspirin EC tablet 162 mg  162 mg Oral Daily BRADY Gregory CNP   162 mg at 03/27/19 1042    insulin glargine (LANTUS) injection vial 35 Units  35 Units Subcutaneous Daily BRADY Gregory CNP   35 Units at 03/27/19 1041    magnesium oxide (MAG-OX) tablet 400 mg  400 mg Oral Daily BRADY Gregory CNP   400 mg at 03/27/19 1402    metoclopramide (REGLAN) tablet 5 mg  5 mg Oral BID WC BRADY Gregory CNP   5 mg at 03/27/19 1042    pravastatin (PRAVACHOL) tablet 20 mg  20 mg Oral Nightly BRADY Gregory CNP        sennosides-docusate sodium (SENOKOT-S) 8.6-50 MG tablet 1 tablet  1 tablet Oral Daily BRADY Gregory - CNP   1 tablet at 03/27/19 1402    polyvinyl alcohol (LIQUIFILM TEARS) 1.4 % ophthalmic solution 2 drop  2 drop Ophthalmic Nightly BRADY Gregory - CNP        sodium chloride flush 0.9 % injection 10 mL  10 mL Intravenous 2 times per day BRADY Gregory - CNP        sodium chloride flush 0.9 % injection 10 mL  10 mL Intravenous PRN BRADY Gregory - CNP        magnesium hydroxide (MILK OF MAGNESIA) 400 MG/5ML suspension 30 mL  30 mL Oral Daily PRN BRADY Gregory - NYDIA        [facility]etron [facility]) injection 4 mg  4 mg Intravenous Q6H PRN BRADY Gregory - CNP        acetaminophen (TYLENOL) tablet 650 mg  650 mg Oral Q4H PRN BRADY Gregory - CNP        0.9 % sodium chloride infusion   Intravenous Continuous BRADY Gregory CNP 75 mL/hr at 03/27/19 1406      glucose (GLUTOSE) 40 % oral gel 15 g  15 g Oral PRN BRADY Gregory - CNP        dextrose 50 % solution 12.5 g  12.5 g Intravenous PRN BRADY Gregory - CNP        glucagon (rDNA) injection 1 mg  1 mg Intramuscular PRN BRADY Gregory CNP        dextrose 5 % solution  100 mL/hr Intravenous PRN BRADY Gregory - CNP        insulin lispro (HUMALOG) injection vial 0-12 Units  0-12 Units Subcutaneous TID WC Carmen Mcdaniel APRN - CNP   6 Units at 03/27/19 1402    insulin lispro (HUMALOG) injection vial 0-6 Units  0-6 Units Subcutaneous Nightly BRADY Gregory CNP        enoxaparin (LOVENOX) injection 30 mg  30 mg Subcutaneous Daily BRADY Gregory - CNP   30 mg at 03/27/19 1046    carvedilol (COREG) tablet 6.25 mg  6.25 mg Oral BID Tanner Garg MD        ramipril (ALTACE) capsule 2.5 mg  2.5 mg Oral Daily Tanner Garg MD        carvedilol (COREG) tablet 3.125 mg  3.125 mg Oral Once Burke A Kady Hawley MD           Social History:    Social History     Socioeconomic History    Marital status:      Spouse name: Not on file    Number of children: Not on file    Years of education: Not on file    Highest education level: Not on file   Occupational History    Not on file   Social Needs    Financial resource strain: Not on file    Food insecurity:     Worry: Not on file     Inability: Not on file    Transportation needs:     Medical: Not on file     Non-medical: Not on file   Tobacco Use    Smoking status: Never Smoker    Smokeless tobacco: Never Used   Substance and Sexual Activity    Alcohol use: No    Drug use: No    Sexual activity: Yes     Partners: Male   Lifestyle    Physical activity:     Days per week: Not on file     Minutes per session: Not on file    Stress: Not on file   Relationships    Social connections:     Talks on phone: Not on file     Gets together: Not on file     Attends Religion service: Not on file     Active member of club or organization: Not on file     Attends meetings of clubs or organizations: Not on file     Relationship status: Not on file    Intimate partner violence:     Fear of current or ex partner: Not on file     Emotionally abused: Not on file     Physically abused: Not on file     Forced sexual activity: Not on file   Other Topics Concern    Not on file   Social History Narrative    ** Merged History Encounter **           Family History:   Family History   Problem Relation Age of Onset    Heart Attack Mother     Diabetes Mother     Diabetes Father        Review of Systems:   · Constitutional: No Fevers/Chills, No recent weight gain weight loss, No fatigue. · Eyes: No visual changes or diplopia. · ENT: No Headaches, hearing loss or vertigo. · Cardiovascular: Per HPI  · Respiratory: Shortness of breath and coughing Gastrointestinal: No Nausea, Vomiting, Diarrhea, or Constipation  · Genitourinary: No dysuria,hematuria.   · Musculoskeletal: results for input(s): PROTIME, INR in the last 72 hours. APTT: No results for input(s): APTT in the last 72 hours. MAG: No results for input(s): MG in the last 72 hours. D Dimer: No results for input(s): DDIMER in the last 72 hours. Troponin T No results for input(s): TROPONINT in the last 72 hours. Pro-BNP No results for input(s): PROBNP in the last 72 hours. Impression   1. Severe hypertension probably related that she didn't take medications since yesterday. 2.  Coronary artery disease, status post coronary artery bypass with 3 grafts after she suffered an extensive anterior wall myocardial infarction in 2010. Fortunately her LV function has normalized and last reported to be around 55%.  Stress test done in July of 2015 shows large scar, but no ischemia. 3.  Severe hyperglycemia, probably related to starting steroids recently by his primary care. 4.  Left lower lobe pneumonia resolving. 5.  Renal insufficiency with worsening of her BUN and creatinine    Recommendations  Restart carvedilol  Because of the high potassium and worsening renal function we may need to discontinue ACE inhibitor  Will ask  nephrology to see patient, as the patient was supposed to see his him next week        Electronically signed by Jesus Manuel Ray MD on 3/27/2019 at 3:10 PM       This note was created with the assistance of a speech-recognition program.  Although the intention is to generate a document that actually reflects the content of the visit, no guarantees can be provided that every mistake has been identified and corrected by editing.     Catherine Tolentino

## 2019-03-27 NOTE — DISCHARGE INSTR - COC
Continuity of Care Form    Patient Name: Marcella Sheppard   :  1960  MRN:  [id number]    Admit date:  3/27/2019  Discharge date:  ***    Code Status Order: Full Code   Advance Directives:     Admitting Physician:  Tana Najera MD  PCP: Enzo Figueroa MD    Discharging Nurse: [facility] Unit/Room#: 2124/2124-01  Discharging Unit Phone Number: ***    Emergency Contact:   Extended Emergency Contact Information  Primary Emergency Contact: Rafael Tapia  [address] [facility]  Home Phone: [phone]  Relation: Spouse  Secondary Emergency Contact: Link Speed  Mobile Phone: [phone]  Relation: Other  Preferred language: Kazakh   needed? Yes    Past Surgical History:  Past Surgical History:   Procedure Laterality Date    CARDIAC SURGERY      CHOLECYSTECTOMY      CORONARY ARTERY BYPASS GRAFT         Immunization History: There is no immunization history on file for this patient.     Active Problems:  Patient Active Problem List   Diagnosis Code    Atypical chest pain R07.89    DM (diabetes mellitus) ([facility] E11.9    CKD (chronic kidney disease) stage 4, GFR 15-29 ml/min ([facility]) N18.4    Angina pectoris ([facility]) I20.9    CAD, S/P CABGx3 2010 I25.10    Hypertension I10    Dyslipidemia E78.5    Hyperkalemia E87.5    Proteinuria R80.9    HTN (hypertension) I10    DM (diabetes mellitus) ([facility]) E11.9    CAD (coronary artery disease) I25.10    CHF (congestive heart failure) ([facility]) I50.9    Hyperglycemia R73.9       Isolation/Infection:   Isolation          No Isolation            Nurse Assessment:  Last Vital Signs: /73   Pulse 99   Temp 98.3 °F (36.8 °C) (Oral)   Resp 18   Ht 5' 2.99\" (1.6 m)   Wt 127 lb (57.6 kg)   SpO2 97%   BMI 22.50 kg/m²     Last documented pain score (0-10 scale):    Last Weight:   Wt Readings from Last 1 Encounters:   19 127 lb (57.6 kg)     Mental Status:  {IP PT MENTAL STATUS:76643}    IV Access:  [address] IV ACCESS:290420443}    Nursing Mobility/ADLs:  Walking   {P DME NQTI:907242071}  Transfer  {P DME XWYD:361540649}  Bathing  {CHP DME VFBR:133014388}  Dressing  {CHP DME CEKO:551022380}  Toileting  {CHP DME RMLQ:169695234}  Feeding  {[facility] DME CUIK:735309607}  Med Admin  {[facility] DME HDOF:312278976}  Med Delivery   {[facility] [place] Delivery:908002280}    Wound Care Documentation and Therapy:        Elimination:  Continence:   · Bowel: {YES / HI:66896}  · Bladder: {YES / UJ:61714}  Urinary Catheter: {Urinary Catheter:639253872}   Colostomy/Ileostomy/Ileal Conduit: {YES / EF:04103}       Date of Last BM: ***    Intake/Output Summary (Last 24 hours) at 3/27/2019 1739  Last data filed at 3/27/2019 0857  Gross per 24 hour   Intake 1000 ml   Output --   Net 1000 ml     I/O last 3 completed shifts:   In: 1000 [IV Piggyback:1000]  Out: -     Safety Concerns:     [address] Safety Concerns:539069317}    Impairments/Disabilities:      [address] Impairments/Disabilities:213192839}    Nutrition Therapy:  Current Nutrition Therapy:   [address] Diet List:232069586}    Routes of Feeding: {[facility] DME Other Feedings:017180593}  Liquids: {Slp liquid thickness:45468}  Daily Fluid Restriction: {CHP DME Yes amt example:918747614}  Last Modified Barium Swallow with Video (Video Swallowing Test): {Done Not Done JVVJ:192497809}    Treatments at the Time of Hospital Discharge:   Respiratory Treatments: ***  Oxygen Therapy:  {Therapy; copd oxygen:90401}  Ventilator:    {[facility] Vent SLXS:678846751}    Rehab Therapies: {THERAPEUTIC INTERVENTION:3067428039}  Weight Bearing Status/Restrictions: [address] [employer] Weight Bearin}  Other Medical Equipment (for information only, NOT a DME order):  {EQUIPMENT:617534302}  Other Treatments: ***    Patient's personal belongings (please select all that are sent with patient):  {ROLANDP DME Belongings:164870243}    RN SIGNATURE:  {Esignature:942987324}    CASE MANAGEMENT/SOCIAL WORK SECTION    Inpatient Status

## 2019-03-27 NOTE — ED NOTES
Pt presents to ED with family stating her sugar has been reading \"HI\" for the past few days. Pt thought meter was broken. Pt recently taking medrol dose pack for bronchitis. Pt family reports pt takes Lantus for diabetes, but is not on any short-acting insulin. Pt denies any symptoms at this time. Pt is A&O x4, PWD, eupneic, and no distress noted.       Rachel Marcelino RN  03/27/19 6707

## 2019-03-27 NOTE — ED PROVIDER NOTES
[address]  eMERGENCY dEPARTMENT eNCOUnter    Pt Name: Alessandra Nuñez  MRN: [id number]  [place] 1960  Date of evaluation: 3/27/19  CHIEF COMPLAINT       Chief Complaint   Patient presents with    Hyperglycemia     HISTORY OF PRESENT ILLNESS   HPI  Glucometer reading \"high\" for 2 days. Started medrol dose pack for URI sx several days ago. Tolerating PO intake. No nausea, vomiting. Some chest pain with coughing 2 days ago but has resolved. No fever. Takes Lantus BID, took 35 units this evening. REVIEW OF SYSTEMS     Review of Systems   Constitutional: Negative for chills and fever. HENT: Positive for rhinorrhea. Negative for congestion, ear pain and sore throat. Eyes: Negative for pain, redness and visual disturbance. Respiratory: Positive for cough. Negative for chest tightness and shortness of breath. Cardiovascular: Negative for chest pain and palpitations. Gastrointestinal: Negative for abdominal pain, constipation, diarrhea, nausea and vomiting. Genitourinary: Negative for dysuria and vaginal discharge. Musculoskeletal: Negative for back pain and neck pain. Skin: Negative for rash and wound. Neurological: Negative for seizures, syncope and headaches.      PASTMEDICAL HISTORY     Past Medical History:   Diagnosis Date    CAD (coronary artery disease)     CH[facility]congest[facility] failure) [facility])     CKD (chronic kidney disease) stage 3, GFR 30-59 ml/min ([facility])     [facility]     Hyperkalemia     Hypertension     Microscopic hematuria     Proteinuria     Renal failure     Status post coronary artery bypass grafting 2010    Type II or unspecified type diabetes mellitus without mention of complication, not stated as uncontrolled     UTI (urinary tract infection)      SURGICAL HISTORY       Past Surgical History:   Procedure Laterality Date    CARDIAC SURGERY      CHOLECYSTECTOMY      CORONARY ARTERY BYPASS GRAFT       CURRENT MEDICATIONS Previous Medications    ASPIRIN 81 MG EC TABLET    Take 162 mg by mouth daily     BLOOD PRESSURE MONITOR KIT    TAKE BLOOD PRESSURE DAILY    CARVEDILOL (COREG) 25 MG TABLET    Take 25 mg by mouth    DEXTROSE, DIABETIC USE, (GLUCOSE) 1 G CHEW    Take by mouth    FERROUS SULFATE 325 (65 FE) MG TABLET    Take 325 mg by mouth daily (with breakfast)    GLUCOSE BLOOD VI TEST STRIPS (ASCENSIA AUTODISC VI;ONE TOUCH ULTRA TEST VI) STRIP    As needed. INSULIN GLARGINE (LANTUS) 100 UNIT/ML INJECTION VIAL    Inject 30 Units into the skin 2 times daily     MAGNESIUM OXIDE (MAG-OX) 400 (240 MG) MG TABLET    Take 1 tablet by mouth daily    METOCLOPRAMIDE (REGLAN) 5 MG TABLET      Take 5 mg by mouth 2 times daily (with meals)     POLYETHYLENE GLYCOL (MIRALAX) POWDER    Take 17 g by mouth daily. PRAVASTATIN (PRAVACHOL) 20 MG TABLET    Take 20 mg by mouth nightly    PROPYLENE GLYCOL (SYSTANE BALANCE) 0.6 % SOLN    Apply to eye    SENNOSIDES-DOCUSATE SODIUM (SENOKOT-S) 8.6-50 MG TABLET    Take 1 tablet by mouth daily. SODIUM POLYSTYRENE (KIONEX) 15 GM/60ML SUSPENSION    TAKE 120MLS BY MOUTH EVERY 30 DAYS    SODIUM POLYSTYRENE (SPS) 15 GM/60ML SUSPENSION    Take 180 mLs by mouth once for 1 dose     ALLERGIES     is allergic to ace inhibitors. FAMILY HISTORY     indicated that her mother is . She indicated that her father is . SOCIALHISTORY      reports that she has never smoked. She has never used smokeless tobacco. She reports that she does not drink alcohol or use drugs. PHYSICAL EXAM     INITIAL VITALS: BP (!) 150/81   Pulse 78   Temp 97.6 °F (36.4 °C) (Oral)   Resp 16   Wt 127 lb (57.6 kg)   SpO2 97%   BMI 22.50 kg/m²    Physical Exam   Constitutional: She is oriented to person, place, and time. Non-toxic appearing, resting comfortably in bed in no acute distress. HENT:   Head: Normocephalic and atraumatic.    Right Ear: External ear normal.   Left Ear: External ear normal. opacities may represent subsegmental   atelectasis/scarring. Developing infectious/inflammatory process is   difficult to exclude. No consolidation. LABS: All lab results were reviewed by myself, and all abnormals are listed below.   Labs Reviewed   CBC WITH AUTO DIFFERENTIAL - Abnormal; Notable for the following components:       Result Value    Seg Neutrophils 81 (*)     Lymphocytes 15 (*)     All other components within normal limits   BASIC METABOLIC PANEL - Abnormal; Notable for the following components:    Glucose 640 (*)     BUN 83 (*)     CREATININE 2.47 (*)     Sodium 126 (*)     Potassium 5.8 (*)     Chloride 90 (*)     CO2 19 (*)     GFR Non- 20 (*)     GFR  24 (*)     All other components within normal limits   BLOOD GAS, VENOUS - Abnormal; Notable for the following components:    pH, [place] 7.301 (*)     HCO3, Venous 21.4 (*)     Negative Base Excess, [place] 5.1 (*)     All other components within normal limits   BASIC METABOLIC PANEL - Abnormal; Notable for the following components:    Glucose 507 (*)     BUN 74 (*)     CREATININE 1.98 (*)     Sodium 130 (*)     Potassium 5.8 (*)     CO2 19 (*)     GFR Non- 26 (*)     GFR  31 (*)     All other components within normal limits   POC GLUCOSE FINGERSTICK - Abnormal; Notable for the following components:    POC Glucose 516 (*)     All other components within normal limits   POC GLUCOSE FINGERSTICK - Abnormal; Notable for the following components:    POC Glucose 442 (*)     All other components within normal limits   POC GLUCOSE FINGERSTICK - Abnormal; Notable for the following components:    POC Glucose 422 (*)     All other components within normal limits   POC GLUCOSE FINGERSTICK - Abnormal; Notable for the following components:    POC Glucose 285 (*)     All other components within normal limits   POCT GLUCOSE - Normal   POCT GLUCOSE - Normal   URINE CULTURE CLEAN CATCH BETA-HYDROXYBUTYRATE   URINALYSIS   BASIC METABOLIC PANEL   POCT GLUCOSE   POCT GLUCOSE   POCT GLUCOSE   POCT GLUCOSE   POCT GLUCOSE     EMERGENCY DEPARTMENT COURSE:   Vitals:    Vitals:    03/27/19 0430 03/27/19 0645 03/27/19 0700 03/27/19 0714   BP: (!) 166/103 (!) 157/83 (!) 150/81 (!) 150/81   Pulse:    78   Resp:    16   Temp:    97.6 °F (36.4 °C)   TempSrc:    Oral   SpO2: 98% 97% 97% 97%   Weight:           The patient was given the following medications while in the emergency department:  Orders Placed This Encounter   Medications    glucose (GLUTOSE) 40 % oral gel 15 g    dextrose 50 % solution 12.5 g    glucagon (rDNA) injection 1 mg    dextrose 5 % solution    insulin regular (HUMULIN R;NOVOLIN R) 100 Units in sodium chloride 0.9 % 100 mL infusion    0.9 % sodium chloride infusion    sodium polystyrene (KAYEXALATE) powder 15 g    insulin regular (HUMULIN R;NOVOLIN R) injection 10 Units    0.9 % sodium chloride bolus     CONSULTS:  None    FINAL IMPRESSION      1. Hyperglycemia    2. Acute kidney injury ([facility]    3. Hyperkalemia          DISPOSITION/PLAN   DISPOSITION Decision To Admit 03/27/2019 01:13:22 AM      PATIENT REFERRED TO:  No follow-up provider specified.   DISCHARGE MEDICATIONS:  New Prescriptions    No medications on file     Keven Ceballos MD  AttendingEmergency Physician                        Vern Zarate MD  03/27/19 5097

## 2019-03-27 NOTE — PROGRESS NOTES
Pt arrived to floor via stretcher from ED and was transfered to bed. Vitals taken. Assessment complete. No distress noted. See doc flowsheet and admission navigator for details. POC and education initiated and reviewed with patient. Call light within reach, and pt educated on its use. Bed in lowest position, and locked. Side rails up x 2. Denied further questions or needs at this time. Will continue to monitor.

## 2019-03-27 NOTE — ED NOTES
Report given to [facility], RN from this RN. Report method by phone. The following was reviewed with receiving RN:   Current vital signs:  BP (!) 174/88   Pulse 79   Temp 97.6 °F (36.4 °C) (Oral)   Resp 18   Ht 5' 2.99\" (1.6 m)   Wt 127 lb (57.6 kg)   SpO2 97%   BMI 22.50 kg/m²                MEWS Score: 1     Any medication or safety alerts were reviewed. Any pending diagnostics and notifications were also reviewed, as well as any safety concerns or issues, abnormal labs, abnormal imaging, and abnormal assessment findings. Questions were answered.           Elijah Lucero RN  03/27/19 4056

## 2019-03-27 NOTE — H&P
[facility].    Date:   3/27/2019  Patient name:  Emmy Ivory  Date of admission:  3/27/2019 12:02 AM  MRN:   [id number]  YOB: 1960    Cc Hyperglycemia       HPI   Pt admitted with sympts of hyperglycemia         HPI   1) Location/Symptom hyperglycemia   2) Timing/Onset: few days more than 500  3) Context/Setting: use of prednisone   4) Quality: no chest pain or sob has [facility]   5) Duration: continuous   6) Modifying Factors: DM CRF HTN   7) Severity: moderate     Pt has been using prednisoen and z pack for last week     Past Medical History:   Diagnosis Date    CAD (coronary artery disease)     CHF (congestive heart failure) (HCC)     CKD (chronic kidney disease) stage 3, GFR 30-59 ml/min [facility]     [facility]     Hyperkalemia     Hypertension     Microscopic hematuria     Proteinuria     Renal failure     Status post coronary artery bypass grafting 2010    Type II or unspecified type diabetes mellitus without mention of complication, not stated as uncontrolled     UTI (urinary tract infection)      Family History   Problem Relation Age of Onset    Heart Attack Mother     Diabetes Mother     Diabetes Father       reports that she has never smoked. She has never used smokeless tobacco. She reports that she does not drink alcohol or use drugs.   Past Medical History:   Diagnosis Date    CAD (coronary artery disease)     CHF (congestive heart failure) (HCC)     CKD (chronic kidney disease) stage 3, GFR 30-59 ml/min (HCC)     Diabetes mellitus ([address]     Hyperkalemia     Hypertension     Microscopic hematuria     Proteinuria     Renal failure     Status post coronary artery bypass grafting 2010    Type II or unspecified type diabetes mellitus without mention of complication, not stated as uncontrolled     UTI (urinary tract infection) Allergies   Allergen Reactions    Ace Inhibitors      DECREAESES KIDNEY FUNCTION- ACE AND ARBS       Review of systems    Constitutional: Negative for fever and fatigue. Respiratory: Negative for cough and shortness of breath. Cardiovascular: Negative for chest pain, palpitations and leg swelling. Gastrointestinal: Negative for abdominal pain, diarrhea and blood in stool. Endocrine: Negative for cold intolerance. Genitourinary: Negative for dysuria and frequency. Musculoskeletal: Negative for back pain and arthralgias. Skin: Negative for color change and rash. Neurological: Negative for dizziness and headaches. Psychiatric/Behavioral: Negative for confusion. ROS  Physical exam     /73   Pulse 99   Temp 98.3 °F (36.8 °C) (Oral)   Resp 18   Ht 5' 2.99\" (1.6 m)   Wt 127 lb (57.6 kg)   SpO2 97%   BMI 22.50 kg/m²      General appearance: well nourished in no distress  Eyes:  NADEEN   Head: AT/NC  ENT NAD   Neck: Trachea midline; supple  Lungs: normal effort, clear to auscultation. CVS sinus with no murmurs. Vasc No JVP, no carotid bruit  Abdomen: Soft, non-tender; no masses or HSM,   Extremities: no edema; no digital cyanosis or clubbing. Musculoskeletal NO joint effusion or synovitis  Skin: No rash or ulcers  Neurologic: Cranial nerves II-XII grossly intact; no motor deficit.   Psych: Appropriate affect, alert and oriented to person, place and time  NO lymphadenopathy            Relevant Labs/Imaging     CBC:   Recent Labs     03/27/19  0020   WBC 8.6   HGB 13.3        BMP:    Recent Labs     03/27/19  0020  03/27/19  0442  03/27/19  0727  03/27/19  1118   *  --  130*  --  138  --   --    K 5.8*  --  5.8*  --  5.0  --   --    CL 90*  --  99  --  108*  --   --    CO2 19*  --  19*  --  18*  --   --    BUN 83*  --  74*  --  69*  --   --    CREATININE 2.47*  --  1.98*  --  1.83*  --   --    GLUCOSE 640*   < > 507*   < > 299*   < > 239    < > = values in this interval not displayed. S. Calcium:  Recent Labs     03/27/19  0727   CALCIUM 8.6     Glycosylated hemoglobin A1C: No results for input(s): LABA1C in the last 72 hours. BNP:No results for input(s): BNP in the last 72 hours. Assessment / Plan      Patient Active Problem List   Diagnosis    Atypical chest pain    DM [facility] ([facility]    CKD (chronic kidney disease) stage 4, GFR 15-29 ml/min ([facility])    Angina pectoris ([facility]    CAD, S/P CABGx3 2010    Hypertension    Dyslipidemia    Hyperkalemia    Proteinuria    HTN (hypertension)    DM (diabetes mellitus) ([facility]    CAD (coronary artery disease)    CHF (congestive heart failure) ([facility])    Hyperglycemia       A/p  DM with hyperglycemia   Due to prednisone use   Now cr at baseline   Can restart coreg and started altace   Hyperkalemia related to renal failure   Pt can restart basalgar 40 units she takes at home  Give 1 litre bolus ns before dc       MD ABA Mims [address], [address] [place] [address].    Phone [phone]   Fax: [phone]  Answering Service: [phone]

## 2019-03-27 NOTE — ED NOTES

## 2019-03-27 NOTE — CARE COORDINATION
Writer placed Referral for Outpatient Diabetic Education & faxed to [address] [place] [address] Dr Sinha at [phone]. Information placed on AVS. Nursing informed & will follow.

## 2019-03-27 NOTE — ED NOTES
Lab called with a critical glucose of 507.  Dr Vonnie Skiff informed     Ronterry Land, RN  03/27/19 6369

## 2019-03-28 LAB
CULTURE: NO GROWTH
Lab: NORMAL
SPECIMEN DESCRIPTION: NORMAL

## 2019-05-01 NOTE — DISCHARGE SUMMARY
Internal Medicine   Discharge Summary         Patient Identification:  Rigoberto Dao is a 60 y.o. female. :  1960  MRN: [id number]     Acct: [de-identified]   Admit Date:  3/27/2019  Discharge date and time: 3/27/2019  7:20 PM     Attending Provider: No att. providers found                                       Reason for Admission: hyperglycemia     Discharge Diagnoses:   Patient Active Problem List   Diagnosis    Atypical chest pain    [facility]    CKD (chronic kidney disease) stage 4, GFR 15-29 ml/min ([facility])    Angina pectoris ([facility]    CAD, S/P CABGx3 2010    Hypertension    Dyslipidemia    Hyperkalemia    Proteinuria    HTN (hypertension)    DM (diabetes mellitus) ([facility]    CAD (coronary artery disease)    CHF (congestive heart failure) ([facility])    Hyperglycemia          Consults:      Procedures    Hospital Course:  See H and P     DM with hyperglycemia   Due to prednisone use   Now cr at baseline   Can restart coreg and started altace   Hyperkalemia related to renal failure   Pt can restart basalgar 40 units she takes at home  Give 1 litre bolus ns before dc         Disposition:   Home    Discharged Condition:  Stable     Discharge Medications:       Paula Chenman   Home Medication Instructions [id number]    Printed on:19 0033   Medication Information                      aspirin 81 MG EC tablet  Take 162 mg by mouth daily              carvedilol (COREG) 6.25 MG tablet  Take 1 tablet by mouth 2 times daily             ferrous sulfate 325 (65 Fe) MG tablet  Take 325 mg by mouth daily (with breakfast)             insulin glargine (LANTUS) 100 UNIT/ML injection vial  Inject 40 Units into the skin daily             polyethylene glycol (MIRALAX) powder  Take 17 g by mouth daily.              pravastatin (PRAVACHOL) 20 MG tablet  Take 20 mg by mouth nightly             ramipril (ALTACE) 2.5 MG capsule  Take 1 capsule by mouth daily                 Discharge Instructions: Follow up with Edil Malin MD in 2 weeks.       Hospital acquired Infections: None      Erika SUNSHINE attending

## 2019-05-15 LAB
EKG ATRIAL RATE: 84 BPM
EKG P AXIS: 51 DEGREES
EKG P-R INTERVAL: 136 MS
EKG Q-T INTERVAL: 372 MS
EKG QRS DURATION: 74 MS
EKG QTC CALCULATION (BAZETT): 439 MS
EKG R AXIS: 6 DEGREES
EKG T AXIS: -137 DEGREES
EKG VENTRICULAR RATE: 84 BPM

## 2019-07-19 RX ORDER — CARVEDILOL 6.25 MG/1
TABLET ORAL
Qty: 60 TABLET | Refills: 3 | OUTPATIENT
Start: 2019-07-19

## 2021-03-16 ENCOUNTER — IMMUNIZATION (OUTPATIENT)
Dept: PRIMARY CARE CLINIC | Age: 61
End: 2021-03-16
Payer: COMMERCIAL

## 2021-03-16 PROCEDURE — 91300 COVID-19, PFIZER VACCINE 30MCG/0.3ML DOSE: CPT | Performed by: INTERNAL MEDICINE

## 2021-03-16 PROCEDURE — 0001A COVID-19, PFIZER VACCINE 30MCG/0.3ML DOSE: CPT | Performed by: INTERNAL MEDICINE

## 2021-04-06 ENCOUNTER — IMMUNIZATION (OUTPATIENT)
Dept: PRIMARY CARE CLINIC | Age: 61
End: 2021-04-06
Payer: COMMERCIAL

## 2021-04-06 PROCEDURE — 91300 COVID-19, PFIZER VACCINE 30MCG/0.3ML DOSE: CPT | Performed by: INTERNAL MEDICINE

## 2021-04-06 PROCEDURE — 0002A COVID-19, PFIZER VACCINE 30MCG/0.3ML DOSE: CPT | Performed by: INTERNAL MEDICINE

## 2022-01-06 ENCOUNTER — HOSPITAL ENCOUNTER (OUTPATIENT)
Age: 62
Discharge: HOME | End: 2022-01-06
Payer: COMMERCIAL

## 2022-01-06 PROCEDURE — 83013 H PYLORI (C-13) BREATH: CPT

## 2022-01-06 PROCEDURE — 36415 COLL VENOUS BLD VENIPUNCTURE: CPT

## 2022-01-06 PROCEDURE — 83014 H PYLORI DRUG ADMIN: CPT

## 2022-01-08 LAB — H PYLORI BREATH TEST: NEGATIVE

## 2022-02-11 PROBLEM — N25.9: Status: ACTIVE | Noted: 2022-02-11

## 2022-02-11 PROBLEM — R94.31 ABNORMAL EKG: Status: ACTIVE | Noted: 2019-03-19

## 2022-08-26 NOTE — DISCHARGE INSTRUCTIONS
1.  Remove the shield at 1:00 p.m. today and begin all the eye drops. 2.  Repeat the eye drops at 5:00 p.m. and before bedtime one drop per bottle, any order. Wait 3-4 minutes between drops. The drops are:    ANTIBIOTIC:   []   Vigamox   [x]  Tobramycin   []  Zymaxid     []  Gatifloxacin     STEROID:     [x]   Prednisolone Acetate        []   Durezol    ANTI-INFLAMMATORY     []    Nevenac    [x]   Ketorolac    []  Ilevro    []  Prolensa    []         3. Place one drop from each bottle by looking up, pull the lower lid down gently and let the drop fall in.    4. You may wipe the eyelid gently with a clean tissue or cotton. 5. Place only the shield over the eye when sleeping for (4) four days:  Fix with tape    6. In the morning remove the shield and start putting in the drops, one drop from each bottle. Replace the shield or wear glasses during the day. 7. Please call Dr Kimmie Victor if you have any problems:    Office [phone] or [phone]    Home [phone]    Cell [phone]    8. Continue all your previous medications. You may use Aspirin, Tylenol, or Advil if needed. 9.  You have an appointment in the office: tomorrow at 10 AM, [address] [place] [address] [phone]    10. Please bring your drops into the office at your next visit.

## 2022-08-29 ENCOUNTER — HOSPITAL ENCOUNTER (OUTPATIENT)
Dept: PREADMISSION TESTING | Age: 62
Discharge: HOME | End: 2022-09-02
Payer: COMMERCIAL

## 2022-08-30 VITALS — BODY MASS INDEX: 24.74 KG/M2 | HEIGHT: 60 IN | WEIGHT: 126 LBS

## 2022-08-30 NOTE — PROGRESS NOTES
Pre-op Instructions For Out-Patient Surgery    Medication Instructions:  Please stop herbs and any supplements now (includes vitamins and minerals). None    Please contact your surgeon and prescribing physician for pre-op instructions for any blood thinners. PT knows to stop aspirin 1 week prior    If you have inhalers/aerosol treatments at home, please use them the morning of your surgery and bring the inhalers with you to the hospital. N/A    Please take the following medications the morning of your surgery with a sip of water:   Altace, Coreg     Surgery Instructions:  After midnight before surgery:  Do not eat or drink anything, including water, mints, gum, and hard candy. You may brush your teeth without swallowing. No smoking, chewing tobacco, or street drugs. Please shower or bathe before surgery. If you were given Surgical Scrub Chlorhexidine Gluconate Liquid (CHG), please shower the night before and the morning of your surgery following the detailed instructions you received during your pre-admission visit. Please do not wear any cologne, lotion, powder, deodorant, jewelry, piercings, perfume, makeup, nail polish, hair accessories, or hair spray on the day of surgery. Wear loose comfortable clothing. Leave your valuables at home. Bring a storage case for any glasses/contacts. An adult who is responsible for you MUST drive you home and should be with you for the first 24 hours after surgery. Friend from [employer]- Does not speak english         The Day of Surgery:  Arrive at [address] [place] [address] Surgery Entrance at the time directed by your surgeon and check in at the desk. If you have a living will or healthcare power of , please bring a copy. N/A    You will be taken to the pre-op holding area where you will be prepared for surgery. A physical assessment will be performed by a nurse practitioner or house officer.   Your IV will be started and you will meet your anesthesiologist.    When you go to surgery, your family will be directed to the surgical waiting room, where the doctor should speak with them after your surgery. After surgery, you will be taken to the recovery room then when you are awake and stable you will go to the short stay unit for preparation to be discharged. Pt's friend Luc Levine verbalizes understanding. PT verbalizes understanding through friend Luc Levine. Pt will need Hebrew  day of surgery.

## 2022-08-31 ENCOUNTER — ANESTHESIA EVENT (OUTPATIENT)
Dept: OPERATING ROOM | Age: 62
End: 2022-08-31
Payer: COMMERCIAL

## 2022-09-01 ENCOUNTER — ANESTHESIA (OUTPATIENT)
Dept: OPERATING ROOM | Age: 62
End: 2022-09-01
Payer: COMMERCIAL

## 2022-09-01 ENCOUNTER — HOSPITAL ENCOUNTER (OUTPATIENT)
Age: 62
Setting detail: OUTPATIENT SURGERY
Discharge: HOME | End: 2022-09-01
Attending: OPHTHALMOLOGY | Admitting: OPHTHALMOLOGY
Payer: COMMERCIAL

## 2022-09-01 VITALS
RESPIRATION RATE: 21 BRPM | HEART RATE: 90 BPM | TEMPERATURE: 98 F | SYSTOLIC BLOOD PRESSURE: 151 MMHG | OXYGEN SATURATION: 99 % | WEIGHT: 126 LBS | DIASTOLIC BLOOD PRESSURE: 89 MMHG | HEIGHT: 60 IN | BODY MASS INDEX: 24.74 KG/M2

## 2022-09-01 PROBLEM — H25.11 AGE-RELATED NUCLEAR CATARACT, RIGHT: Status: RESOLVED | Noted: 2022-09-01 | Resolved: 2022-09-01

## 2022-09-01 LAB — GLUCOSE BLD-MCNC: 134 MG/DL (ref 65–105)

## 2022-09-01 PROCEDURE — 7100000011 HC PHASE II RECOVERY - ADDTL 15 MIN: Performed by: OPHTHALMOLOGY

## 2022-09-01 PROCEDURE — 6360000002 HC RX W HCPCS: Performed by: OPHTHALMOLOGY

## 2022-09-01 PROCEDURE — V2632 POST CHMBR INTRAOCULAR LENS: HCPCS | Performed by: OPHTHALMOLOGY

## 2022-09-01 PROCEDURE — 6370000000 HC RX 637 (ALT 250 FOR IP): Performed by: OPHTHALMOLOGY

## 2022-09-01 PROCEDURE — 6360000002 HC RX W HCPCS: Performed by: NURSE ANESTHETIST, CERTIFIED REGISTERED

## 2022-09-01 PROCEDURE — 7100000010 HC PHASE II RECOVERY - FIRST 15 MIN: Performed by: OPHTHALMOLOGY

## 2022-09-01 PROCEDURE — 3700000001 HC ADD 15 MINUTES (ANESTHESIA): Performed by: OPHTHALMOLOGY

## 2022-09-01 PROCEDURE — 3600000002 HC SURGERY LEVEL 2 BASE: Performed by: OPHTHALMOLOGY

## 2022-09-01 PROCEDURE — 2500000003 HC RX 250 WO HCPCS: Performed by: OPHTHALMOLOGY

## 2022-09-01 PROCEDURE — 3600000012 HC SURGERY LEVEL 2 ADDTL 15MIN: Performed by: OPHTHALMOLOGY

## 2022-09-01 PROCEDURE — 2580000003 HC RX 258: Performed by: ANESTHESIOLOGY

## 2022-09-01 PROCEDURE — 3700000000 HC ANESTHESIA ATTENDED CARE: Performed by: OPHTHALMOLOGY

## 2022-09-01 PROCEDURE — 82947 ASSAY GLUCOSE BLOOD QUANT: CPT

## 2022-09-01 PROCEDURE — 2709999900 HC NON-CHARGEABLE SUPPLY: Performed by: OPHTHALMOLOGY

## 2022-09-01 DEVICE — LENS INTOCU +18.5 DIOPT L13MM DIA6MM 0DEG A CONSTANT 118.7: Type: IMPLANTABLE DEVICE | Site: EYE | Status: FUNCTIONAL

## 2022-09-01 RX ORDER — KETOROLAC TROMETHAMINE 5 MG/ML
1 SOLUTION OPHTHALMIC EVERY 5 MIN PRN
Status: COMPLETED | OUTPATIENT
Start: 2022-09-01 | End: 2022-09-01

## 2022-09-01 RX ORDER — TOBRAMYCIN 3 MG/ML
1 SOLUTION/ DROPS OPHTHALMIC EVERY 5 MIN PRN
Status: DISCONTINUED | OUTPATIENT
Start: 2022-09-01 | End: 2022-09-01 | Stop reason: HOSPADM

## 2022-09-01 RX ORDER — FENTANYL CITRATE 50 UG/ML
INJECTION, SOLUTION INTRAMUSCULAR; INTRAVENOUS PRN
Status: DISCONTINUED | OUTPATIENT
Start: 2022-09-01 | End: 2022-09-01 | Stop reason: SDUPTHER

## 2022-09-01 RX ORDER — EPINEPHRINE 1 MG/ML(1)
AMPUL (ML) INJECTION PRN
Status: DISCONTINUED | OUTPATIENT
Start: 2022-09-01 | End: 2022-09-01 | Stop reason: ALTCHOICE

## 2022-09-01 RX ORDER — MIDAZOLAM HYDROCHLORIDE 1 MG/ML
INJECTION INTRAMUSCULAR; INTRAVENOUS PRN
Status: DISCONTINUED | OUTPATIENT
Start: 2022-09-01 | End: 2022-09-01 | Stop reason: SDUPTHER

## 2022-09-01 RX ORDER — CYCLOPENTOLATE HYDROCHLORIDE 10 MG/ML
1 SOLUTION/ DROPS OPHTHALMIC EVERY 5 MIN PRN
Status: COMPLETED | OUTPATIENT
Start: 2022-09-01 | End: 2022-09-01

## 2022-09-01 RX ORDER — SODIUM CHLORIDE 0.9 % (FLUSH) 0.9 %
5-40 SYRINGE (ML) INJECTION PRN
Status: DISCONTINUED | OUTPATIENT
Start: 2022-09-01 | End: 2022-09-01 | Stop reason: HOSPADM

## 2022-09-01 RX ORDER — TIMOLOL MALEATE 5 MG/ML
SOLUTION/ DROPS OPHTHALMIC PRN
Status: DISCONTINUED | OUTPATIENT
Start: 2022-09-01 | End: 2022-09-01 | Stop reason: ALTCHOICE

## 2022-09-01 RX ORDER — LIDOCAINE HYDROCHLORIDE 10 MG/ML
INJECTION, SOLUTION INFILTRATION; PERINEURAL PRN
Status: DISCONTINUED | OUTPATIENT
Start: 2022-09-01 | End: 2022-09-01 | Stop reason: ALTCHOICE

## 2022-09-01 RX ORDER — SODIUM CHLORIDE 0.9 % (FLUSH) 0.9 %
5-40 SYRINGE (ML) INJECTION EVERY 12 HOURS SCHEDULED
Status: DISCONTINUED | OUTPATIENT
Start: 2022-09-01 | End: 2022-09-01 | Stop reason: HOSPADM

## 2022-09-01 RX ORDER — NEOMYCIN SULFATE, POLYMYXIN B SULFATE, AND DEXAMETHASONE 3.5; 10000; 1 MG/G; [USP'U]/G; MG/G
OINTMENT OPHTHALMIC PRN
Status: DISCONTINUED | OUTPATIENT
Start: 2022-09-01 | End: 2022-09-01 | Stop reason: ALTCHOICE

## 2022-09-01 RX ORDER — SODIUM CHLORIDE, SODIUM LACTATE, POTASSIUM CHLORIDE, CALCIUM CHLORIDE 600; 310; 30; 20 MG/100ML; MG/100ML; MG/100ML; MG/100ML
INJECTION, SOLUTION INTRAVENOUS CONTINUOUS
Status: DISCONTINUED | OUTPATIENT
Start: 2022-09-01 | End: 2022-09-01 | Stop reason: HOSPADM

## 2022-09-01 RX ORDER — SODIUM CHLORIDE 9 MG/ML
INJECTION, SOLUTION INTRAVENOUS PRN
Status: DISCONTINUED | OUTPATIENT
Start: 2022-09-01 | End: 2022-09-01 | Stop reason: HOSPADM

## 2022-09-01 RX ORDER — PHENYLEPHRINE HCL 2.5 %
1 DROPS OPHTHALMIC (EYE) EVERY 5 MIN PRN
Status: COMPLETED | OUTPATIENT
Start: 2022-09-01 | End: 2022-09-01

## 2022-09-01 RX ORDER — SODIUM CHLORIDE 9 MG/ML
INJECTION, SOLUTION INTRAVENOUS CONTINUOUS
Status: DISCONTINUED | OUTPATIENT
Start: 2022-09-01 | End: 2022-09-01 | Stop reason: HOSPADM

## 2022-09-01 RX ORDER — BALANCED SALT SOLUTION ENRICHED WITH BICARBONATE, DEXTROSE, AND GLUTATHIONE
KIT INTRAOCULAR PRN
Status: DISCONTINUED | OUTPATIENT
Start: 2022-09-01 | End: 2022-09-01 | Stop reason: ALTCHOICE

## 2022-09-01 RX ORDER — LIDOCAINE HYDROCHLORIDE 20 MG/ML
INJECTION, SOLUTION EPIDURAL; INFILTRATION; INTRACAUDAL; PERINEURAL PRN
Status: DISCONTINUED | OUTPATIENT
Start: 2022-09-01 | End: 2022-09-01 | Stop reason: ALTCHOICE

## 2022-09-01 RX ORDER — BUPIVACAINE HYDROCHLORIDE 5 MG/ML
0.2 INJECTION, SOLUTION EPIDURAL; INTRACAUDAL SEE ADMIN INSTRUCTIONS
Status: COMPLETED | OUTPATIENT
Start: 2022-09-01 | End: 2022-09-01

## 2022-09-01 RX ORDER — LIDOCAINE HYDROCHLORIDE 10 MG/ML
1 INJECTION, SOLUTION EPIDURAL; INFILTRATION; INTRACAUDAL; PERINEURAL
Status: DISCONTINUED | OUTPATIENT
Start: 2022-09-01 | End: 2022-09-01 | Stop reason: HOSPADM

## 2022-09-01 RX ORDER — TOBRAMYCIN 3 MG/ML
SOLUTION/ DROPS OPHTHALMIC PRN
Status: DISCONTINUED | OUTPATIENT
Start: 2022-09-01 | End: 2022-09-01 | Stop reason: ALTCHOICE

## 2022-09-01 RX ORDER — BUPIVACAINE HYDROCHLORIDE 5 MG/ML
INJECTION, SOLUTION EPIDURAL; INTRACAUDAL PRN
Status: DISCONTINUED | OUTPATIENT
Start: 2022-09-01 | End: 2022-09-01 | Stop reason: ALTCHOICE

## 2022-09-01 RX ADMIN — CYCLOPENTOLATE HYDROCHLORIDE 1 DROP: 10 SOLUTION OPHTHALMIC at 07:22

## 2022-09-01 RX ADMIN — PHENYLEPHRINE HYDROCHLORIDE 1 DROP: 25 SOLUTION/ DROPS OPHTHALMIC at 07:28

## 2022-09-01 RX ADMIN — SODIUM CHLORIDE: 9 INJECTION, SOLUTION INTRAVENOUS at 07:37

## 2022-09-01 RX ADMIN — TOBRAMYCIN 1 DROP: 3 SOLUTION/ DROPS OPHTHALMIC at 07:28

## 2022-09-01 RX ADMIN — KETOROLAC TROMETHAMINE 1 DROP: 5 SOLUTION/ DROPS OPHTHALMIC at 07:28

## 2022-09-01 RX ADMIN — MIDAZOLAM 2 MG: 1 INJECTION INTRAMUSCULAR; INTRAVENOUS at 08:35

## 2022-09-01 RX ADMIN — BUPIVACAINE HYDROCHLORIDE 1 MG: 5 INJECTION, SOLUTION EPIDURAL; INTRACAUDAL; PERINEURAL at 07:22

## 2022-09-01 RX ADMIN — BUPIVACAINE HYDROCHLORIDE 1 MG: 5 INJECTION, SOLUTION EPIDURAL; INTRACAUDAL; PERINEURAL at 07:28

## 2022-09-01 RX ADMIN — PHENYLEPHRINE HYDROCHLORIDE 1 DROP: 25 SOLUTION/ DROPS OPHTHALMIC at 07:22

## 2022-09-01 RX ADMIN — KETOROLAC TROMETHAMINE 1 DROP: 5 SOLUTION/ DROPS OPHTHALMIC at 07:22

## 2022-09-01 RX ADMIN — TOBRAMYCIN 1 DROP: 3 SOLUTION/ DROPS OPHTHALMIC at 07:22

## 2022-09-01 RX ADMIN — FENTANYL CITRATE 100 MCG: 50 INJECTION, SOLUTION INTRAMUSCULAR; INTRAVENOUS at 08:44

## 2022-09-01 RX ADMIN — CYCLOPENTOLATE HYDROCHLORIDE 1 DROP: 10 SOLUTION OPHTHALMIC at 07:29

## 2022-09-01 ASSESSMENT — PAIN SCALES - GENERAL
PAINLEVEL_OUTOF10: 0

## 2022-09-01 ASSESSMENT — PAIN - FUNCTIONAL ASSESSMENT: PAIN_FUNCTIONAL_ASSESSMENT: 0-10

## 2022-09-01 ASSESSMENT — ENCOUNTER SYMPTOMS: SHORTNESS OF BREATH: 0

## 2022-09-01 NOTE — ANESTHESIA PRE PROCEDURE
Department of Anesthesiology  Preprocedure Note       Name:  Bailey Murphy   Age:  60 y.o.  :  1960                                          MRN:  [id number]         Date:  2022      Surgeon: Ximena Ospina):  Regi Oneal MD    Procedure: Procedure(s):  EYE CATARACT EMULSIFICATION IOL IMPLANT    Medications prior to admission:   Prior to Admission medications    Medication Sig Start Date End Date Taking?  Authorizing Provider   sodium zirconium cyclosilicate (LOKELMA) 10 g PACK oral suspension Take 10 g by mouth Twice a Week 22   Lalo Cheng MD   BISACODYL 5 MG EC tablet USE AS DIRECTED 12/10/21   Historical Provider, MD   ferrous sulfate (IRON 325) 325 (65 Fe) MG tablet Take by mouth daily    Historical Provider, MD   esomeprazole (NEXIUM) 20 MG delayed release capsule Take 20 mg by mouth every morning (before breakfast)    Historical Provider, MD   vitamin D3 (CHOLECALCIFEROL) 125 MCG (5000 UT) TABS tablet 1,000 Units daily  Patient not taking: Reported on 2022    Historical Provider, MD   TRUE METRIX BLOOD GLUCOSE TEST strip USE AS DIRECTED ONCE DAILY 10/26/20   Historical Provider, MD   TRUEplus Lancets 30G MISC USE TWICE DAILY 10/26/20   Historical Provider, MD   magnesium oxide (MAG-OX) 400 (241.3 Mg) MG TABS tablet TAKE 1 TABLET BY MOUTH ONCE DAILY WITH FOOD FOR 90 DAYS 10/26/20   Historical Provider, MD   Sennosides-Docusate Sodium 8.6-50 MG CAPS Take 2 tablets by mouth daily    Historical Provider, MD   Blood Pressure Monitor KIT Take Bp daily 20   Lalo Cheng MD   HUMALOG KWIKPEN 100 UNIT/ML pen 14 Units daily  19   Historical Provider, MD   metoclopramide (REGLAN) 5 MG tablet Take 5 mg by mouth 2 times daily 19   Historical Provider, MD Barillas Swati KWIKPEN 100 UNIT/ML injection pen Inject 42 Units into the skin daily 19   Historical Provider, MD   sodium bicarbonate 650 MG tablet Take 650 mg by mouth daily 19   Historical Provider, MD ramipril (ALTACE) 2.5 MG capsule Take 1 capsule by mouth daily 3/28/19   Katiana Mojica MD   carvedilol (COREG) 6.25 MG tablet Take 1 tablet by mouth 2 times daily  Patient taking differently: Take 3.125 mg by mouth 2 times daily 3/27/19   Katiana Mojica MD   pravastatin (PRAVACHOL) 20 MG tablet Take 20 mg by mouth nightly    Historical Provider, MD   polyethylene glycol (MIRALAX) powder Take 17 g by mouth daily. 9/25/12   Diana Sharif MD   aspirin 81 MG EC tablet Take 162 mg by mouth daily     Historical Provider, MD       Current medications:    Current Facility-Administered Medications   Medication Dose Route Frequency Provider Last Rate Last Admin    bupivacaine (PF) (MARCAINE) 0.5 % injection 1 mg  0.2 mL Ophthalmic See Admin Instructions Marcheta Cushing, MD        lactated ringers infusion   IntraVENous Continuous Marcheta Cushing, MD        sodium chloride flush 0.9 % injection 5-40 mL  5-40 mL IntraVENous 2 times per day Marcheta Cushing, MD        sodium chloride flush 0.9 % injection 5-40 mL  5-40 mL IntraVENous PRN Marcheta Cushing, MD        0.9 % sodium chloride infusion   IntraVENous PRN Marcheta Cushing, MD        phenylephrine (MYDFRIN) 2.5 % ophthalmic solution 1 drop  1 drop Right Eye Q5 Min PRN Marcheta Cushing, MD        cyclopentolate (CYCLOGYL) 1 % ophthalmic solution 1 drop  1 drop Right Eye Q5 Min PRN Marcheta Cushing, MD        tobramycin (TOBREX) 0.3 % ophthalmic solution 1 drop  1 drop Right Eye Q5 Min PRN Marcheta Cushing, MD        ketorolac (ACULAR) 0.5 % ophthalmic solution 1 drop  1 drop Right Eye Q5 Min PRN Alejandra Lau MD        0.9 % sodium chloride infusion   IntraVENous Continuous Jagdeep Resendiz MD        lidocaine PF 1 % injection 1 mL  1 mL IntraDERmal Once PRN Angelito Hyde MD           Allergies:     Allergies   Allergen Reactions    Ace Inhibitors      DECREAESES KIDNEY FUNCTION- ACE AND ARBS       Problem List:    Patient Active Problem List Diagnosis Code    Atypical chest pain R07.89    DM (diabetes mellitus) ([facility] [address] E11.9    CKD (chronic kidney disease) stage 4, GFR 15-29 ml/min ([facility]) N18.4    Angina pectoris ([facility]) I20.9    CAD, S/P CABGx3 2010 I25.10    Hypertension I10    Dyslipidemia E78.5    Hyperkalemia E87.5    Proteinuria R80.9    CHF (congestive heart failure) ([facility]) I50.9    Hyperglycemia R73.9    Abnormal EKG R94.31    Arteriosclerosis of arterial coronary artery bypass graft I25.810    Disorder resulting from impaired renal tubular function, unspecified N25.9    Hyperlipidemia E78.5    Ischemic cardiomyopathy I25.5    Rash and other nonspecific skin eruption R21    Shortness of breath R06.02       Past Medical History:        Diagnosis Date    CAD (coronary artery disease)     CHF (congestive heart failure) ([facility])     CKD (chronic kidney disease) stage 3, GFR 30-59 ml/min ([facility])     Diabetes mellitus ([facility])     Hyperkalemia     Hypertension     Microscopic hematuria     Proteinuria     Renal failure     Status post coronary artery bypass grafting 2010    Type II or unspecified type diabetes mellitus without mention of complication, not stated as uncontrolled     UTI (urinary tract infection)        Past Surgical History:        Procedure Laterality Date    CARDIAC SURGERY      CHOLECYSTECTOMY      CORONARY ARTERY BYPASS GRAFT         Social History:    Social History     Tobacco Use    Smoking status: Never    Smokeless tobacco: Never   Substance Use Topics    Alcohol use:  No                                Counseling given: Not Answered      Vital Signs (Current):   Vitals:    09/01/22 0645   BP: (!) 150/81   Pulse: 95   Resp: 18   Temp: 97.1 °F (36.2 °C)   TempSrc: Infrared   SpO2: 97%   Weight: 126 lb (57.2 kg)   Height: 5' (1.524 m)                                              BP Readings from Last 3 Encounters:   09/01/22 (!) 150/81   02/11/22 110/64   11/23/20 110/76       NPO Status: Time of last liquid consumption: 2100                        Time of last solid consumption: 1900                        Date of last liquid consumption: 08/31/22                        Date of last solid food consumption: 08/31/22    BMI:   Wt Readings from Last 3 Encounters:   09/01/22 126 lb (57.2 kg)   08/30/22 126 lb (57.2 kg)   02/11/22 126 lb 3.2 oz (57.2 kg)     Body mass index is 24.61 kg/m². CBC:   Lab Results   Component Value Date/Time    WBC 7.85 12/01/2020 12:00 AM    RBC 4.64 12/01/2020 12:00 AM    RBC 4.28 05/09/2012 11:23 AM    HGB 13.0 12/01/2020 12:00 AM    HCT 40.2 12/01/2020 12:00 AM    MCV 86.6 12/01/2020 12:00 AM     12/01/2020 12:00 AM     02/20/2020 12:00 AM     05/09/2012 11:23 AM       CMP:   Lab Results   Component Value Date/Time     02/20/2020 12:00 AM    K 5.3 02/20/2020 12:00 AM     02/20/2020 12:00 AM    CO2 21 02/20/2020 12:00 AM    BUN 41 12/01/2020 12:00 AM    CREATININE 2.00 12/01/2020 12:00 AM    GFRAA 34 03/27/2019 07:27 AM    LABGLOM 26.0 02/20/2020 12:00 AM    LABGLOM 28 03/27/2019 07:27 AM    LABGLOM 29 06/30/2015 03:52 AM    GLUCOSE 185 02/20/2020 12:00 AM    GLUCOSE 320 05/09/2012 11:18 AM    CALCIUM 9.1 12/01/2020 12:00 AM    BILITOT 0.3 02/20/2020 12:00 AM    ALKPHOS 124 02/20/2020 12:00 AM    AST 35 02/20/2020 12:00 AM    ALT 33 02/20/2020 12:00 AM       POC Tests: No results for input(s): POCGLU, POCNA, POCK, POCCL, POCBUN, POCHEMO, POCHCT in the last 72 hours.     Coags: No results found for: PROTIME, INR, APTT    HCG (If Applicable): No results found for: PREGTESTUR, PREGSERUM, HCG, HCGQUANT     ABGs: No results found for: PHART, PO2ART, BUB8QJP, RTQ0IUN, BEART, A7MJLHTQ     Type & Screen (If Applicable):  No results found for: LABABO, LABRH    Drug/Infectious Status (If Applicable):  No results found for: HIV, HEPCAB    COVID-19 Screening (If Applicable): No results found for: COVID19        Anesthesia Evaluation  Patient summary reviewed and Nursing notes reviewed no history of anesthetic complications:   Airway: Mallampati: II  TM distance: >3 FB   Neck ROM: full  Mouth opening: > = 3 FB   Dental: normal exam         Pulmonary:normal exam  breath sounds clear to auscultation      (-) shortness of breath                           Cardiovascular:    (+) hypertension:, past MI: > 6 months and no interval change, CAD: no interval change, CABG/stent (CABG x 3 in 2010): no interval change, CHF:, hyperlipidemia    (-)  angina    ECG reviewed  Rhythm: regular  Rate: normal                 ROS comment: EKG RBBB     Neuro/Psych:               GI/Hepatic/Renal:   (+) GERD: well controlled, renal disease: CRI,           Endo/Other:    (+) DiabetesType II DM, , electrolyte abnormalities (hyperkalemia), . Abdominal:             Vascular: Other Findings:           Anesthesia Plan      MAC     ASA 3       Induction: intravenous. MIPS: Prophylactic antiemetics administered. Anesthetic plan and risks discussed with patient. Plan discussed with CRNA.                     Nevaeh Willams MD   9/1/2022

## 2022-09-01 NOTE — OP NOTE
Arlene Oliver [address] [id number] 60 y.o. OPERATIVE NOTE    Preoperative Diagnosis: Cataract the right eye    Postoperative Diagnosis: Cataract the right eye     Procedure: Phacoemulsification with intraocular lens inplantation, complex case the right eye    Surgeon: Yash Loo MD    Anesthesia: peribulbar  With monitored sedation      Complications: none    Specimens: none    EBL: none    Indications for procedure: The patient is a 60y.o. year old with decreased vision, glare and halos around lights, and trouble with activities of daily living. Examination revealed a visually significant cataract in the the right eye. Risks, benefits, and alternatives to surgery were discussed with the patient and the patient elected to proceed with phacoemulsification with lens implantation . This was a complex case due to poorly dilated pupil that required pupil stretching and/or iris hooks. Vision Blue was planned due opacification of lens and poor red reflex. Details of the procedure: Following informed consent, the patient was taken to the operating room and placed in the supine position. The eye was prepped and draped in the usual sterile fashion using aseptic technique for cataract surgery and a lid speculum was placed between the lids. Several drops of .5% Marcaine were placed on the eye. A crescent blade was used to make 2 mm tunnel at the limbus and a  keratome blade was used to enter the eye at the  9 o'clock postion. A side port incision was made in the    2 o'clock position. A small amount of preservative free 1% lidocaine followed by 1:4000 PF epi was placed in the eye. Vision blue was place in the eye and washed out with balance salt solution. The eye was filled with viscoelastic. Additional side port incisions were made as needed and the pupil was stretched with two hooks. A continuous tear capsulorhexis was performed.   The lens was hydrodissected and hydrodilineated with balanced salt solution. Phacoemulsification was performed in a divide and conquer technique. Irrigation/aspiration was used to remove all cortical material from the capsular bag. The eye was filled with viscoelastic and a foldable posterior chamber intraocular lens was injected into the capsular bag and rotated into position model SN60WF 18.5 diopter power. Irrigation/aspiration was used to remove all excess viscoelastic. The eye was pressurized and the wounds were check for leaks and none were found. The patient had antibiotic, steroid, timoptic and antibiotic/steroid ointment placed in the eye. The lid speculum was removed. The eye was covered with a shield. The patient went to the PACU in excellent condition, having tolerated the procedure extremely well and will follow up with me tomorrow for postop day 1 care. Post-op instructions were discussed with patient and family. John Alejandro MD     Implants:  Implant Name Type Inv.  Item Serial No.  Lot No. LRB No. Used Action   LENS INTOCU +18.5 DIOPT L13MM DIA6MM 0DEG A CONSTANT 118.7 - [id number]  LENS INTOCU +18.5 DIOPT L13MM DIA6MM 0DEG A CONSTANT 118.7 [id number] [employer]-  Right 1 Implanted

## 2022-09-01 NOTE — H&P
Tori Boggs is a 60y.o. year old who presents for elective outpatient ophthalmic surgery with Santosh Carcamo MD.  The patient complains of decreased vision, glare and halos around lights, and trouble with vision for activities of daily living.       Past Medical History:   Diagnosis Date    CAD (coronary artery disease)     CHF (congestive heart failure) ([facility])     CKD (chronic kidney disease) stage 3, GFR 30-59 ml/min ([facility])     Diabetes mellitus ([facility])     Hyperkalemia     Hypertension     Microscopic hematuria     Proteinuria     Renal failure     Status post coronary artery bypass grafting 2010    Type II or unspecified type diabetes mellitus without mention of complication, not stated as uncontrolled     UTI (urinary tract infection)        Past Surgical History:   Procedure Laterality Date    CARDIAC SURGERY      CHOLECYSTECTOMY      CORONARY ARTERY BYPASS GRAFT           Current Facility-Administered Medications:     lactated ringers infusion, , IntraVENous, Continuous, Alejandra Lau MD    sodium chloride flush 0.9 % injection 5-40 mL, 5-40 mL, IntraVENous, 2 times per day, Cleavon Maker MD Judi    sodium chloride flush 0.9 % injection 5-40 mL, 5-40 mL, IntraVENous, PRN, Alejandra Lau MD    0.9 % sodium chloride infusion, , IntraVENous, PRN, Alejandra Lau MD    tobramycin (TOBREX) 0.3 % ophthalmic solution 1 drop, 1 drop, Right Eye, Q5 Min PRN, Santosh Carcamo MD, 1 drop at 09/01/22 0728    0.9 % sodium chloride infusion, , IntraVENous, Continuous, Pardeep Sena MD, Last Rate: 125 mL/hr at 09/01/22 0737, New Bag at 09/01/22 0737    lidocaine PF 1 % injection 1 mL, 1 mL, IntraDERmal, Once PRN, Pardeep Sena MD      Allergies   Allergen Reactions    Ace Inhibitors      DECREAESES KIDNEY FUNCTION- ACE AND ARBS         PHYSICAL EXAMINATION    Vitals:    09/01/22 0645   BP: (!) 150/81   Pulse: 95   Resp: 18   Temp: 97.1 °F (36.2 °C)   SpO2: 97%       Gen: NAD  HEENT: Visually significant cataract   Pulm: CTA Bilaterally  Heart: RRR, no murmurs  Abd: soft, non-tender  Ext: no edema  Neuro: no focal deficits    Assessment:   1. Visually significant cataract   2. See preoperative ophthalmology notes    Plan:   1. Risks, benefits, alternatives to surgery discussed with the patient and family. 2. All questions were answered to their satisfaction. 3. Ok to proceed with surgery as planned.     Yash Loo MD

## 2022-09-01 NOTE — ANESTHESIA POSTPROCEDURE EVALUATION
Department of Anesthesiology  Postprocedure Note    Patient: Babar Pike  MRN: [id number]  YOB: 1960  Date of evaluation: 9/1/2022      Procedure Summary     Date: 09/01/22 Room / Location: [address] [place] [address] Delgado Jarrett 06 / [facility]: [facility]    Anesthesia Start: 7637 Anesthesia Stop: 3218    Procedure: EYE CATARACT EMULSIFICATION IOL IMPLANT w/ VISION BLUE AND PERIBULBAR BLOCK (Right: Eye) Diagnosis:       Cataract, nuclear sclerotic, right eye      (RIGHT EYE CATARACT)    Surgeons: Marcos Morris MD Responsible Provider: Dave Stephens MD    Anesthesia Type: MAC ASA Status: 3          Anesthesia Type: No value filed.     Tarik Phase I:      Tarik Phase II: Tarik Score: 10      Anesthesia Post Evaluation    Comments: POST- ANESTHESIA EVALUATION       Pt Name: Babar Pike  MRN: [id number]  YOB: 1960  Date of evaluation: 9/1/2022  Time:  10:49 AM      BP (!) 151/89   Pulse 90   Temp 98 °F (36.7 °C)   Resp 21   Ht 5' (1.524 m)   Wt 126 lb (57.2 kg)   SpO2 99%   BMI 24.61 kg/m²      Consciousness Level  Awake  Cardiopulmonary Status  Stable  Pain Adequately Treated YES  Nausea / Vomiting  NO  Adequate Hydration  YES  Anesthesia Related Complications NONE      Electronically signed by Dave Stephens MD on 9/1/2022 at 10:49 AM

## 2023-02-24 ENCOUNTER — HOSPITAL ENCOUNTER (OUTPATIENT)
Age: 63
Discharge: HOME | End: 2023-02-24
Payer: COMMERCIAL

## 2023-02-24 DIAGNOSIS — N18.5 SECONDARY DIABETES MELLITUS WITH STAGE 5 CHRONIC KIDNEY DISEASE (HCC): ICD-10-CM

## 2023-02-24 DIAGNOSIS — E13.22 SECONDARY DIABETES MELLITUS WITH STAGE 5 CHRONIC KIDNEY DISEASE (HCC): ICD-10-CM

## 2023-02-24 DIAGNOSIS — N18.5 CKD (CHRONIC KIDNEY DISEASE) STAGE 5, GFR LESS THAN 15 ML/MIN (HCC): ICD-10-CM

## 2023-02-24 DIAGNOSIS — N18.5 BENIGN HYPERTENSION WITH CKD (CHRONIC KIDNEY DISEASE) STAGE V (HCC): ICD-10-CM

## 2023-02-24 DIAGNOSIS — E55.9 VITAMIN D DEFICIENCY: ICD-10-CM

## 2023-02-24 DIAGNOSIS — I12.0 BENIGN HYPERTENSION WITH CKD (CHRONIC KIDNEY DISEASE) STAGE V (HCC): ICD-10-CM

## 2023-02-24 LAB
ANION GAP SERPL CALCULATED.3IONS-SCNC: 14 MMOL/L (ref 9–17)
BUN SERPL-MCNC: 43 MG/DL (ref 8–23)
CALCIUM SERPL-MCNC: 7.7 MG/DL (ref 8.6–10.4)
CHLORIDE SERPL-SCNC: 99 MMOL/L (ref 98–107)
CHLORIDE, UR: 23 MMOL/L
CO2 SERPL-SCNC: 24 MMOL/L (ref 20–31)
COMPLEMENT C3: 126 MG/DL (ref 90–180)
COMPLEMENT C4: 31 MG/DL (ref 10–40)
CREAT SERPL-MCNC: 4.17 MG/DL (ref 0.5–0.9)
FREE KAPPA/LAMBDA RATIO: 1.29 (ref 0.26–1.65)
GFR SERPL CREATININE-BSD FRML MDRD: 11 ML/MIN/1.73M2
GLUCOSE SERPL-MCNC: 100 MG/DL (ref 70–99)
KAPPA LC FREE SER-MCNC: 21.63 MG/DL (ref 0.37–1.94)
LAMBDA LC FREE SERPL-MCNC: 16.72 MG/DL (ref 0.57–2.63)
POTASSIUM SERPL-SCNC: 2.8 MMOL/L (ref 3.7–5.3)
SODIUM SERPL-SCNC: 137 MMOL/L (ref 135–144)
SODIUM,UR: 31 MMOL/L

## 2023-02-24 PROCEDURE — 86225 DNA ANTIBODY NATIVE: CPT

## 2023-02-24 PROCEDURE — 86162 COMPLEMENT TOTAL (CH50): CPT

## 2023-02-24 PROCEDURE — 83521 IG LIGHT CHAINS FREE EACH: CPT

## 2023-02-24 PROCEDURE — 84300 ASSAY OF URINE SODIUM: CPT

## 2023-02-24 PROCEDURE — 80048 BASIC METABOLIC PNL TOTAL CA: CPT

## 2023-02-24 PROCEDURE — 86038 ANTINUCLEAR ANTIBODIES: CPT

## 2023-02-24 PROCEDURE — 86160 COMPLEMENT ANTIGEN: CPT

## 2023-02-24 PROCEDURE — 82436 ASSAY OF URINE CHLORIDE: CPT

## 2023-02-24 PROCEDURE — 36415 COLL VENOUS BLD VENIPUNCTURE: CPT

## 2023-02-24 PROCEDURE — 87086 URINE CULTURE/COLONY COUNT: CPT

## 2023-02-25 LAB
ANA SER QL IA: ABNORMAL
DSDNA IGG SER QL IA: 3.9 IU/ML
MICROORGANISM SPEC CULT: NORMAL
NUCLEAR IGG SER IA-RTO: 0.7 U/ML
SPECIMEN DESCRIPTION: NORMAL

## 2023-02-26 LAB — COMPLEMENT TOTAL (CH50): 61.3 U/ML (ref 38.7–89.9)

## 2023-02-28 ENCOUNTER — HOSPITAL ENCOUNTER (INPATIENT)
Age: 63
LOS: 6 days | Discharge: HOME | DRG: 469 | End: 2023-03-07
Attending: EMERGENCY MEDICINE | Admitting: INTERNAL MEDICINE
Payer: COMMERCIAL

## 2023-02-28 DIAGNOSIS — N19 UREMIA: ICD-10-CM

## 2023-02-28 DIAGNOSIS — N18.5 STAGE 5 CHRONIC KIDNEY DISEASE NOT ON CHRONIC DIALYSIS (HCC): ICD-10-CM

## 2023-02-28 DIAGNOSIS — E87.1 HYPONATREMIA: Primary | ICD-10-CM

## 2023-02-28 DIAGNOSIS — E87.6 HYPOKALEMIA: ICD-10-CM

## 2023-02-28 LAB
ABSOLUTE EOS #: 0.1 K/UL (ref 0–0.4)
ABSOLUTE LYMPH #: 2 K/UL (ref 1–4.8)
ABSOLUTE MONO #: 0.6 K/UL (ref 0.1–1.3)
BASOPHILS # BLD: 0 % (ref 0–2)
BASOPHILS ABSOLUTE: 0 K/UL (ref 0–0.2)
EOSINOPHILS RELATIVE PERCENT: 2 % (ref 0–4)
HCT VFR BLD AUTO: 37.4 % (ref 36–46)
HGB BLD-MCNC: 12.6 G/DL (ref 12–16)
LYMPHOCYTES # BLD: 32 % (ref 24–44)
MCH RBC QN AUTO: 26.8 PG (ref 26–34)
MCHC RBC AUTO-ENTMCNC: 33.7 G/DL (ref 31–37)
MCV RBC AUTO: 79.4 FL (ref 80–100)
MONOCYTES # BLD: 9 % (ref 1–7)
PDW BLD-RTO: 14.8 % (ref 11.5–14.9)
PLATELET # BLD AUTO: 327 K/UL (ref 150–450)
PMV BLD AUTO: 7.8 FL (ref 6–12)
RBC # BLD: 4.7 M/UL (ref 4–5.2)
SEG NEUTROPHILS: 57 % (ref 36–66)
SEGMENTED NEUTROPHILS ABSOLUTE COUNT: 3.5 K/UL (ref 1.3–9.1)
WBC # BLD AUTO: 6.2 K/UL (ref 3.5–11)

## 2023-02-28 PROCEDURE — 99285 EMERGENCY DEPT VISIT HI MDM: CPT

## 2023-02-28 PROCEDURE — 36415 COLL VENOUS BLD VENIPUNCTURE: CPT

## 2023-02-28 PROCEDURE — 83605 ASSAY OF LACTIC ACID: CPT

## 2023-02-28 PROCEDURE — 84484 ASSAY OF TROPONIN QUANT: CPT

## 2023-02-28 PROCEDURE — 85025 COMPLETE CBC W/AUTO DIFF WBC: CPT

## 2023-02-28 PROCEDURE — 83735 ASSAY OF MAGNESIUM: CPT

## 2023-02-28 PROCEDURE — 83690 ASSAY OF LIPASE: CPT

## 2023-02-28 PROCEDURE — 80053 COMPREHEN METABOLIC PANEL: CPT

## 2023-02-28 PROCEDURE — 93005 ELECTROCARDIOGRAM TRACING: CPT | Performed by: EMERGENCY MEDICINE

## 2023-02-28 ASSESSMENT — PAIN SCALES - GENERAL: PAINLEVEL_OUTOF10: 2

## 2023-02-28 ASSESSMENT — PAIN DESCRIPTION - LOCATION: LOCATION: ABDOMEN

## 2023-02-28 ASSESSMENT — PAIN - FUNCTIONAL ASSESSMENT: PAIN_FUNCTIONAL_ASSESSMENT: 0-10

## 2023-02-28 ASSESSMENT — PAIN DESCRIPTION - PAIN TYPE: TYPE: ACUTE PAIN

## 2023-02-28 NOTE — LETTER
[address] [facility] PROGRESSIVE CARE  [address] [facility] [address]  Phone: [phone]              [facility] D[facility] Instructions    Arlene is to have hemodialysis at [address] [place] [address] in [place] on Tuesdays, Thursdays and Saturdays at 2:45pm. [employer] is to provide transportation to and from dialysis; Patient must call to set-up ride the day prior to dialysis appointment with phone number [phone]. Location: [facility], [facility], [address] [place] [address]     [address] Phone: [phone]  [employer] Phone: [phone]    All appointments and communications arranged with [facility] through [facility] Case Management and discussed with patient prior to discharge. For questions call [phone].

## 2023-03-01 PROBLEM — N17.9 ACUTE KIDNEY INJURY SUPERIMPOSED ON CHRONIC KIDNEY DISEASE (HCC): Status: ACTIVE | Noted: 2023-03-01

## 2023-03-01 PROBLEM — N18.9 ACUTE KIDNEY INJURY SUPERIMPOSED ON CHRONIC KIDNEY DISEASE (HCC): Status: ACTIVE | Noted: 2023-03-01

## 2023-03-01 LAB
ALBUMIN SERPL-MCNC: 3 G/DL (ref 3.5–5.2)
ALBUMIN SERPL-MCNC: 3.1 G/DL (ref 3.5–5.2)
ALP SERPL-CCNC: 149 U/L (ref 35–104)
ALP SERPL-CCNC: 160 U/L (ref 35–104)
ALT SERPL-CCNC: 21 U/L (ref 5–33)
ALT SERPL-CCNC: 23 U/L (ref 5–33)
ANION GAP SERPL CALCULATED.3IONS-SCNC: 14 MMOL/L (ref 9–17)
ANION GAP SERPL CALCULATED.3IONS-SCNC: 16 MMOL/L (ref 9–17)
AST SERPL-CCNC: 40 U/L
AST SERPL-CCNC: 45 U/L
BACTERIA: NORMAL
BILIRUB SERPL-MCNC: 0.4 MG/DL (ref 0.3–1.2)
BILIRUB SERPL-MCNC: 0.5 MG/DL (ref 0.3–1.2)
BILIRUBIN URINE: NEGATIVE
BUN SERPL-MCNC: 35 MG/DL (ref 8–23)
BUN SERPL-MCNC: 35 MG/DL (ref 8–23)
CALCIUM SERPL-MCNC: 7.8 MG/DL (ref 8.6–10.4)
CALCIUM SERPL-MCNC: 8 MG/DL (ref 8.6–10.4)
CASTS UA: NORMAL /LPF
CHLORIDE SERPL-SCNC: 87 MMOL/L (ref 98–107)
CHLORIDE SERPL-SCNC: 89 MMOL/L (ref 98–107)
CO2 SERPL-SCNC: 18 MMOL/L (ref 20–31)
CO2 SERPL-SCNC: 20 MMOL/L (ref 20–31)
COLOR: YELLOW
COMPLEMENT C3: 110 MG/DL (ref 90–180)
COMPLEMENT C4: 28 MG/DL (ref 10–40)
CREAT SERPL-MCNC: 3.49 MG/DL (ref 0.5–0.9)
CREAT SERPL-MCNC: 3.5 MG/DL (ref 0.5–0.9)
EKG ATRIAL RATE: 85 BPM
EKG P AXIS: 57 DEGREES
EKG P-R INTERVAL: 152 MS
EKG Q-T INTERVAL: 444 MS
EKG QRS DURATION: 140 MS
EKG QTC CALCULATION (BAZETT): 528 MS
EKG R AXIS: 0 DEGREES
EKG T AXIS: 47 DEGREES
EKG VENTRICULAR RATE: 85 BPM
EPITHELIAL CELLS UA: NORMAL /HPF
FREE KAPPA/LAMBDA RATIO: 1.23 (ref 0.26–1.65)
GFR SERPL CREATININE-BSD FRML MDRD: 14 ML/MIN/1.73M2
GFR SERPL CREATININE-BSD FRML MDRD: 14 ML/MIN/1.73M2
GLUCOSE BLD-MCNC: 117 MG/DL (ref 65–105)
GLUCOSE BLD-MCNC: 120 MG/DL (ref 65–105)
GLUCOSE BLD-MCNC: 132 MG/DL (ref 65–105)
GLUCOSE BLD-MCNC: 152 MG/DL (ref 65–105)
GLUCOSE BLD-MCNC: 161 MG/DL (ref 65–105)
GLUCOSE SERPL-MCNC: 128 MG/DL (ref 70–99)
GLUCOSE SERPL-MCNC: 173 MG/DL (ref 70–99)
GLUCOSE UR STRIP.AUTO-MCNC: NEGATIVE MG/DL
HBV CORE IGM SER QL: NONREACTIVE
HBV SURFACE AG SER QL: NONREACTIVE
HCV AB SER QL: REACTIVE
KAPPA LC FREE SER-MCNC: 15.03 MG/DL (ref 0.37–1.94)
KETONES UR STRIP.AUTO-MCNC: NEGATIVE MG/DL
LACTATE PLASV-SCNC: 0.9 MMOL/L (ref 0.5–2.2)
LAMBDA LC FREE SERPL-MCNC: 12.24 MG/DL (ref 0.57–2.63)
LDLC SERPL-MCNC: 186 U/L (ref 135–214)
LEUKOCYTE ESTERASE UR QL STRIP.AUTO: ABNORMAL
LIPASE SERPL-CCNC: 80 U/L (ref 13–60)
MAGNESIUM SERPL-MCNC: 1.5 MG/DL (ref 1.6–2.6)
MAGNESIUM SERPL-MCNC: 1.8 MG/DL (ref 1.6–2.6)
NITRITE UR QL STRIP.AUTO: NEGATIVE
OSMOLALITY URINE: 190 MOSM/KG (ref 80–1300)
POTASSIUM SERPL-SCNC: 2.8 MMOL/L (ref 3.7–5.3)
POTASSIUM SERPL-SCNC: 2.8 MMOL/L (ref 3.7–5.3)
PROT SERPL-MCNC: 6.4 G/DL (ref 6.4–8.3)
PROT SERPL-MCNC: 7 G/DL (ref 6.4–8.3)
PROT UR STRIP.AUTO-MCNC: 6 MG/DL (ref 5–8)
PROT UR STRIP.AUTO-MCNC: ABNORMAL MG/DL
RBC CLUMPS #/AREA URNS AUTO: NORMAL /HPF
SERUM OSMOLALITY: 268 MOSM/KG (ref 275–295)
SODIUM SERPL-SCNC: 120 MMOL/L (ref 135–144)
SODIUM SERPL-SCNC: 121 MMOL/L (ref 135–144)
SODIUM SERPL-SCNC: 121 MMOL/L (ref 135–144)
SODIUM SERPL-SCNC: 123 MMOL/L (ref 135–144)
SODIUM,UR: 53 MMOL/L
SPECIFIC GRAVITY UA: 1.01 (ref 1–1.03)
TROPONIN I SERPL DL<=0.01 NG/ML-MCNC: 41 NG/L (ref 0–14)
TROPONIN I SERPL DL<=0.01 NG/ML-MCNC: 46 NG/L (ref 0–14)
TROPONIN I SERPL DL<=0.01 NG/ML-MCNC: 50 NG/L (ref 0–14)
TURBIDITY: CLEAR
URATE SERPL-MCNC: 7.4 MG/DL (ref 2.4–5.7)
URINE HGB: ABNORMAL
UROBILINOGEN, URINE: NORMAL
WBC UA: NORMAL /HPF

## 2023-03-01 PROCEDURE — 86160 COMPLEMENT ANTIGEN: CPT

## 2023-03-01 PROCEDURE — 02HV33Z INSERTION OF INFUSION DEVICE INTO SUPERIOR VENA CAVA, PERCUTANEOUS APPROACH: ICD-10-PCS | Performed by: INTERNAL MEDICINE

## 2023-03-01 PROCEDURE — 0JH63XZ INSERTION OF TUNNELED VASCULAR ACCESS DEVICE INTO CHEST SUBCUTANEOUS TISSUE AND FASCIA, PERCUTANEOUS APPROACH: ICD-10-PCS | Performed by: INTERNAL MEDICINE

## 2023-03-01 PROCEDURE — 84550 ASSAY OF BLOOD/URIC ACID: CPT

## 2023-03-01 PROCEDURE — 51798 US URINE CAPACITY MEASURE: CPT

## 2023-03-01 PROCEDURE — 94761 N-INVAS EAR/PLS OXIMETRY MLT: CPT

## 2023-03-01 PROCEDURE — 84300 ASSAY OF URINE SODIUM: CPT

## 2023-03-01 PROCEDURE — 2500000003 HC RX 250 WO HCPCS: Performed by: INTERNAL MEDICINE

## 2023-03-01 PROCEDURE — 84484 ASSAY OF TROPONIN QUANT: CPT

## 2023-03-01 PROCEDURE — 83935 ASSAY OF URINE OSMOLALITY: CPT

## 2023-03-01 PROCEDURE — 2580000003 HC RX 258: Performed by: EMERGENCY MEDICINE

## 2023-03-01 PROCEDURE — 83930 ASSAY OF BLOOD OSMOLALITY: CPT

## 2023-03-01 PROCEDURE — 6370000000 HC RX 637 (ALT 250 FOR IP): Performed by: INTERNAL MEDICINE

## 2023-03-01 PROCEDURE — 83615 LACTATE (LD) (LDH) ENZYME: CPT

## 2023-03-01 PROCEDURE — 86225 DNA ANTIBODY NATIVE: CPT

## 2023-03-01 PROCEDURE — 86021 WBC ANTIBODY IDENTIFICATION: CPT

## 2023-03-01 PROCEDURE — 6370000000 HC RX 637 (ALT 250 FOR IP): Performed by: NURSE PRACTITIONER

## 2023-03-01 PROCEDURE — 2060000000 HC ICU INTERMEDIATE R&B

## 2023-03-01 PROCEDURE — 6370000000 HC RX 637 (ALT 250 FOR IP): Performed by: EMERGENCY MEDICINE

## 2023-03-01 PROCEDURE — 6360000002 HC RX W HCPCS: Performed by: NURSE PRACTITIONER

## 2023-03-01 PROCEDURE — 82947 ASSAY GLUCOSE BLOOD QUANT: CPT

## 2023-03-01 PROCEDURE — 93010 ELECTROCARDIOGRAM REPORT: CPT | Performed by: INTERNAL MEDICINE

## 2023-03-01 PROCEDURE — 99223 1ST HOSP IP/OBS HIGH 75: CPT | Performed by: INTERNAL MEDICINE

## 2023-03-01 PROCEDURE — 86803 HEPATITIS C AB TEST: CPT

## 2023-03-01 PROCEDURE — 86038 ANTINUCLEAR ANTIBODIES: CPT

## 2023-03-01 PROCEDURE — 83516 IMMUNOASSAY NONANTIBODY: CPT

## 2023-03-01 PROCEDURE — 80053 COMPREHEN METABOLIC PANEL: CPT

## 2023-03-01 PROCEDURE — 84295 ASSAY OF SERUM SODIUM: CPT

## 2023-03-01 PROCEDURE — 6360000002 HC RX W HCPCS: Performed by: INTERNAL MEDICINE

## 2023-03-01 PROCEDURE — 36415 COLL VENOUS BLD VENIPUNCTURE: CPT

## 2023-03-01 PROCEDURE — 83735 ASSAY OF MAGNESIUM: CPT

## 2023-03-01 PROCEDURE — 87086 URINE CULTURE/COLONY COUNT: CPT

## 2023-03-01 PROCEDURE — 86705 HEP B CORE ANTIBODY IGM: CPT

## 2023-03-01 PROCEDURE — 51701 INSERT BLADDER CATHETER: CPT

## 2023-03-01 PROCEDURE — 87340 HEPATITIS B SURFACE AG IA: CPT

## 2023-03-01 PROCEDURE — 2580000003 HC RX 258: Performed by: INTERNAL MEDICINE

## 2023-03-01 PROCEDURE — 81001 URINALYSIS AUTO W/SCOPE: CPT

## 2023-03-01 PROCEDURE — 83521 IG LIGHT CHAINS FREE EACH: CPT

## 2023-03-01 RX ORDER — LANOLIN ALCOHOL/MO/W.PET/CERES
400 CREAM (GRAM) TOPICAL DAILY
Status: DISCONTINUED | OUTPATIENT
Start: 2023-03-01 | End: 2023-03-03

## 2023-03-01 RX ORDER — HYDRALAZINE HYDROCHLORIDE 20 MG/ML
20 INJECTION INTRAMUSCULAR; INTRAVENOUS EVERY 6 HOURS PRN
Status: DISCONTINUED | OUTPATIENT
Start: 2023-03-01 | End: 2023-03-01

## 2023-03-01 RX ORDER — HEPARIN SODIUM 5000 [USP'U]/ML
5000 INJECTION, SOLUTION INTRAVENOUS; SUBCUTANEOUS EVERY 8 HOURS SCHEDULED
Status: DISCONTINUED | OUTPATIENT
Start: 2023-03-01 | End: 2023-03-07 | Stop reason: HOSPADM

## 2023-03-01 RX ORDER — SODIUM CHLORIDE 9 MG/ML
INJECTION, SOLUTION INTRAVENOUS PRN
Status: DISCONTINUED | OUTPATIENT
Start: 2023-03-01 | End: 2023-03-07 | Stop reason: HOSPADM

## 2023-03-01 RX ORDER — SENNA AND DOCUSATE SODIUM 50; 8.6 MG/1; MG/1
2 TABLET, FILM COATED ORAL DAILY PRN
Status: DISCONTINUED | OUTPATIENT
Start: 2023-03-01 | End: 2023-03-04

## 2023-03-01 RX ORDER — SODIUM BICARBONATE 650 MG/1
650 TABLET ORAL DAILY
Status: DISCONTINUED | OUTPATIENT
Start: 2023-03-01 | End: 2023-03-01

## 2023-03-01 RX ORDER — POTASSIUM CHLORIDE 7.45 MG/ML
10 INJECTION INTRAVENOUS
Status: COMPLETED | OUTPATIENT
Start: 2023-03-01 | End: 2023-03-01

## 2023-03-01 RX ORDER — CARVEDILOL 6.25 MG/1
6.25 TABLET ORAL 2 TIMES DAILY WITH MEALS
Status: DISCONTINUED | OUTPATIENT
Start: 2023-03-01 | End: 2023-03-05

## 2023-03-01 RX ORDER — ACETAMINOPHEN 650 MG/1
650 SUPPOSITORY RECTAL EVERY 6 HOURS PRN
Status: DISCONTINUED | OUTPATIENT
Start: 2023-03-01 | End: 2023-03-07 | Stop reason: HOSPADM

## 2023-03-01 RX ORDER — ONDANSETRON 2 MG/ML
4 INJECTION INTRAMUSCULAR; INTRAVENOUS EVERY 6 HOURS PRN
Status: DISCONTINUED | OUTPATIENT
Start: 2023-03-01 | End: 2023-03-01

## 2023-03-01 RX ORDER — DEXTROSE MONOHYDRATE 100 MG/ML
INJECTION, SOLUTION INTRAVENOUS CONTINUOUS PRN
Status: DISCONTINUED | OUTPATIENT
Start: 2023-03-01 | End: 2023-03-07 | Stop reason: HOSPADM

## 2023-03-01 RX ORDER — NIFEDIPINE 30 MG/1
30 TABLET, FILM COATED, EXTENDED RELEASE ORAL DAILY
Status: DISCONTINUED | OUTPATIENT
Start: 2023-03-01 | End: 2023-03-01

## 2023-03-01 RX ORDER — POTASSIUM CHLORIDE 20 MEQ/1
40 TABLET, EXTENDED RELEASE ORAL ONCE
Status: COMPLETED | OUTPATIENT
Start: 2023-03-01 | End: 2023-03-01

## 2023-03-01 RX ORDER — POTASSIUM CHLORIDE 20 MEQ/1
10 TABLET, EXTENDED RELEASE ORAL ONCE
Status: COMPLETED | OUTPATIENT
Start: 2023-03-01 | End: 2023-03-01

## 2023-03-01 RX ORDER — ACETAMINOPHEN 325 MG/1
650 TABLET ORAL EVERY 6 HOURS PRN
Status: DISCONTINUED | OUTPATIENT
Start: 2023-03-01 | End: 2023-03-07 | Stop reason: HOSPADM

## 2023-03-01 RX ORDER — MAGNESIUM SULFATE 1 G/100ML
1000 INJECTION INTRAVENOUS ONCE
Status: COMPLETED | OUTPATIENT
Start: 2023-03-01 | End: 2023-03-01

## 2023-03-01 RX ORDER — SODIUM CHLORIDE 0.9 % (FLUSH) 0.9 %
5-40 SYRINGE (ML) INJECTION EVERY 12 HOURS SCHEDULED
Status: DISCONTINUED | OUTPATIENT
Start: 2023-03-01 | End: 2023-03-07 | Stop reason: HOSPADM

## 2023-03-01 RX ORDER — INSULIN GLARGINE 100 [IU]/ML
42 INJECTION, SOLUTION SUBCUTANEOUS DAILY
Status: DISCONTINUED | OUTPATIENT
Start: 2023-03-01 | End: 2023-03-02

## 2023-03-01 RX ORDER — NIFEDIPINE 30 MG/1
30 TABLET, FILM COATED, EXTENDED RELEASE ORAL ONCE
Status: COMPLETED | OUTPATIENT
Start: 2023-03-01 | End: 2023-03-01

## 2023-03-01 RX ORDER — PROCHLORPERAZINE EDISYLATE 5 MG/ML
10 INJECTION INTRAMUSCULAR; INTRAVENOUS EVERY 6 HOURS PRN
Status: DISCONTINUED | OUTPATIENT
Start: 2023-03-01 | End: 2023-03-07 | Stop reason: HOSPADM

## 2023-03-01 RX ORDER — SODIUM BICARBONATE 650 MG/1
650 TABLET ORAL 3 TIMES DAILY
Status: DISCONTINUED | OUTPATIENT
Start: 2023-03-01 | End: 2023-03-07

## 2023-03-01 RX ORDER — SODIUM CHLORIDE 9 MG/ML
INJECTION, SOLUTION INTRAVENOUS CONTINUOUS
Status: DISCONTINUED | OUTPATIENT
Start: 2023-03-01 | End: 2023-03-01

## 2023-03-01 RX ORDER — HYDRALAZINE HYDROCHLORIDE 20 MG/ML
10 INJECTION INTRAMUSCULAR; INTRAVENOUS EVERY 6 HOURS PRN
Status: DISCONTINUED | OUTPATIENT
Start: 2023-03-01 | End: 2023-03-07 | Stop reason: HOSPADM

## 2023-03-01 RX ORDER — FERROUS SULFATE 325(65) MG
325 TABLET ORAL
Status: DISCONTINUED | OUTPATIENT
Start: 2023-03-01 | End: 2023-03-07 | Stop reason: HOSPADM

## 2023-03-01 RX ORDER — PANTOPRAZOLE SODIUM 40 MG/1
40 TABLET, DELAYED RELEASE ORAL
Status: DISCONTINUED | OUTPATIENT
Start: 2023-03-01 | End: 2023-03-07 | Stop reason: HOSPADM

## 2023-03-01 RX ORDER — RAMIPRIL 2.5 MG/1
2.5 CAPSULE ORAL DAILY
Status: DISCONTINUED | OUTPATIENT
Start: 2023-03-01 | End: 2023-03-01

## 2023-03-01 RX ORDER — PRAVASTATIN SODIUM 20 MG
20 TABLET ORAL NIGHTLY
Status: DISCONTINUED | OUTPATIENT
Start: 2023-03-02 | End: 2023-03-07 | Stop reason: HOSPADM

## 2023-03-01 RX ORDER — INSULIN LISPRO 100 [IU]/ML
0-4 INJECTION, SOLUTION INTRAVENOUS; SUBCUTANEOUS NIGHTLY
Status: DISCONTINUED | OUTPATIENT
Start: 2023-03-01 | End: 2023-03-07 | Stop reason: HOSPADM

## 2023-03-01 RX ORDER — ASPIRIN 81 MG/1
162 TABLET ORAL DAILY
Status: DISCONTINUED | OUTPATIENT
Start: 2023-03-01 | End: 2023-03-07 | Stop reason: HOSPADM

## 2023-03-01 RX ORDER — SODIUM CHLORIDE 0.9 % (FLUSH) 0.9 %
5-40 SYRINGE (ML) INJECTION PRN
Status: DISCONTINUED | OUTPATIENT
Start: 2023-03-01 | End: 2023-03-07 | Stop reason: HOSPADM

## 2023-03-01 RX ORDER — INSULIN LISPRO 100 [IU]/ML
0-8 INJECTION, SOLUTION INTRAVENOUS; SUBCUTANEOUS
Status: DISCONTINUED | OUTPATIENT
Start: 2023-03-01 | End: 2023-03-07 | Stop reason: HOSPADM

## 2023-03-01 RX ORDER — MAGNESIUM SULFATE HEPTAHYDRATE 40 MG/ML
2000 INJECTION, SOLUTION INTRAVENOUS ONCE
Status: COMPLETED | OUTPATIENT
Start: 2023-03-01 | End: 2023-03-01

## 2023-03-01 RX ORDER — ONDANSETRON 4 MG/1
4 TABLET, ORALLY DISINTEGRATING ORAL EVERY 8 HOURS PRN
Status: DISCONTINUED | OUTPATIENT
Start: 2023-03-01 | End: 2023-03-01

## 2023-03-01 RX ADMIN — PROCHLORPERAZINE EDISYLATE 10 MG: 5 INJECTION INTRAMUSCULAR; INTRAVENOUS at 04:13

## 2023-03-01 RX ADMIN — POTASSIUM CHLORIDE 40 MEQ: 1500 TABLET, EXTENDED RELEASE ORAL at 15:57

## 2023-03-01 RX ADMIN — POTASSIUM CHLORIDE 10 MEQ: 7.46 INJECTION, SOLUTION INTRAVENOUS at 13:06

## 2023-03-01 RX ADMIN — FERROUS SULFATE TAB 325 MG (65 MG ELEMENTAL FE) 325 MG: 325 (65 FE) TAB at 09:59

## 2023-03-01 RX ADMIN — MAGNESIUM OXIDE TAB 400 MG (241.3 MG ELEMENTAL MG) 400 MG: 400 (241.3 MG) TAB at 09:59

## 2023-03-01 RX ADMIN — HEPARIN SODIUM 5000 UNITS: 5000 INJECTION INTRAVENOUS; SUBCUTANEOUS at 22:05

## 2023-03-01 RX ADMIN — RAMIPRIL 2.5 MG: 2.5 CAPSULE ORAL at 09:59

## 2023-03-01 RX ADMIN — POTASSIUM CHLORIDE 10 MEQ: 7.46 INJECTION, SOLUTION INTRAVENOUS at 10:15

## 2023-03-01 RX ADMIN — INSULIN GLARGINE 42 UNITS: 100 INJECTION, SOLUTION SUBCUTANEOUS at 10:00

## 2023-03-01 RX ADMIN — SODIUM BICARBONATE 650 MG: 650 TABLET ORAL at 22:05

## 2023-03-01 RX ADMIN — MAGNESIUM SULFATE HEPTAHYDRATE 1000 MG: 1 INJECTION, SOLUTION INTRAVENOUS at 03:02

## 2023-03-01 RX ADMIN — POTASSIUM CHLORIDE 10 MEQ: 7.46 INJECTION, SOLUTION INTRAVENOUS at 11:47

## 2023-03-01 RX ADMIN — NIFEDIPINE 30 MG: 30 TABLET, EXTENDED RELEASE ORAL at 01:21

## 2023-03-01 RX ADMIN — ASPIRIN 162 MG: 81 TABLET, COATED ORAL at 09:59

## 2023-03-01 RX ADMIN — POTASSIUM CHLORIDE 10 MEQ: 7.46 INJECTION, SOLUTION INTRAVENOUS at 14:08

## 2023-03-01 RX ADMIN — SODIUM BICARBONATE 650 MG: 650 TABLET ORAL at 09:59

## 2023-03-01 RX ADMIN — POTASSIUM CHLORIDE 10 MEQ: 1500 TABLET, EXTENDED RELEASE ORAL at 01:21

## 2023-03-01 RX ADMIN — CARVEDILOL 6.25 MG: 6.25 TABLET, FILM COATED ORAL at 10:05

## 2023-03-01 RX ADMIN — HEPARIN SODIUM 5000 UNITS: 5000 INJECTION INTRAVENOUS; SUBCUTANEOUS at 15:57

## 2023-03-01 RX ADMIN — SODIUM BICARBONATE: 84 INJECTION, SOLUTION INTRAVENOUS at 16:11

## 2023-03-01 RX ADMIN — HYDRALAZINE HYDROCHLORIDE 20 MG: 20 INJECTION INTRAMUSCULAR; INTRAVENOUS at 03:04

## 2023-03-01 RX ADMIN — PANTOPRAZOLE SODIUM 40 MG: 40 TABLET, DELAYED RELEASE ORAL at 10:05

## 2023-03-01 RX ADMIN — CARVEDILOL 6.25 MG: 6.25 TABLET, FILM COATED ORAL at 18:13

## 2023-03-01 RX ADMIN — SODIUM CHLORIDE: 9 INJECTION, SOLUTION INTRAVENOUS at 01:19

## 2023-03-01 RX ADMIN — HEPARIN SODIUM 5000 UNITS: 5000 INJECTION INTRAVENOUS; SUBCUTANEOUS at 06:14

## 2023-03-01 RX ADMIN — MAGNESIUM SULFATE HEPTAHYDRATE 2000 MG: 40 INJECTION, SOLUTION INTRAVENOUS at 15:58

## 2023-03-01 ASSESSMENT — ENCOUNTER SYMPTOMS
ABDOMINAL PAIN: 0
COUGH: 0
NAUSEA: 1
VOMITING: 1
SHORTNESS OF BREATH: 0

## 2023-03-01 ASSESSMENT — PAIN SCALES - GENERAL: PAINLEVEL_OUTOF10: 0

## 2023-03-01 NOTE — CONSULTS
Department of Internal Medicine  Nephrology Marivel Bethea MD   Consult Note    Reason for consultation: Management of acute kidney injury and chronic kidney disease stage IV. Consulting physician: Ila Ayala MD.    History of present illness: This is a 60 y.o. 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 I saw patient in the office last week Friday 2/24/23 and recommended dialysis but she refused at this time as she was planning to travel to [facility] to visit family in middle of March and she will be gone for 5 months. She presented to the emergency department last night with 2 episodes of nausea and vomiting associated with uncontrolled hypertension. Vital signs at presentation were remarkable for blood pressure 218/89 mmHg. Studies at presentation [2/28/2023] was remarkable for BUNs/creatinine 35/3.50 mg/dL; Sodium 123 mmol/L and potassium 2.8 mmol/L. Today, blood pressure control is better and patient has nausea but no vomiting. Appetite is decreased. She denies shortness of breath.     Ace inhibitors    Past Medical History:   Diagnosis Date    CAD (coronary artery disease)     CHF (congestive heart failure) ([facility])     CKD (chronic kidney disease) stage 3, GFR 30-59 ml/min ([facility])     Diabetes mellitus ([facility])     Hyperkalemia     Hypertension     Microscopic hematuria     Proteinuria     Renal failure     Status post coronary artery bypass grafting 2010    Type II or unspecified type diabetes mellitus without mention of complication, not stated as uncontrolled     UTI (urinary tract infection)      Scheduled Meds:   sodium chloride flush  5-40 mL IntraVENous 2 times per day    heparin (porcine)  5,000 Units SubCUTAneous 3 times per day    aspirin  162 mg Oral Daily    insulin glargine  42 Units SubCUTAneous Daily    insulin lispro  0-8 Units SubCUTAneous TID WC    insulin lispro  0-4 Units SubCUTAneous Nightly    ferrous sulfate  325 mg Oral Daily with breakfast    magnesium oxide  400 mg Oral Daily    pantoprazole  40 mg Oral QAM AC    [START ON 3/2/2023] pravastatin  20 mg Oral Nightly    ramipril  2.5 mg Oral Daily    sodium bicarbonate  650 mg Oral Daily    carvedilol  6.25 mg Oral BID WC    magnesium sulfate  2,000 mg IntraVENous Once    potassium chloride  40 mEq Oral Once     Continuous Infusions:   sodium chloride 75 mL/hr at 03/01/23 0119    sodium chloride      dextrose       PRN Meds:.sodium chloride flush, sodium chloride, acetaminophen **OR** acetaminophen, glucose, dextrose bolus **OR** dextrose bolus, glucagon (rDNA), dextrose, bisacodyl, sennosides-docusate sodium, prochlorperazine, hydrALAZINE    Family History   Problem Relation Age of Onset    Heart Attack Mother     Diabetes Mother     Diabetes Father       Social History     Socioeconomic History    Marital status:      Spouse name: None    Number of children: None    Years of education: None    Highest education level: None   Tobacco Use    Smoking status: Never    Smokeless tobacco: Never   Vaping Use    Vaping Use: Never used   Substance and Sexual Activity    Alcohol use: No    Drug use: No    Sexual activity: Yes     Partners: Male   Social History Narrative    ** Merged History Encounter **          Review of systems: CNS - no headache or dizziness; Cardiac - no chest pain; Respiratory - no shortness of breath; Gastrointestinal - +nausea,+vomiting,no diarrhea; Musculoskeletal - general body aches; Skin/Integument - no rashes.     Physical Exam:    VITALS:  /60   Pulse 81   Temp 97.4 °F (36.3 °C) (Oral)   Resp 18   Ht 5' (1.524 m)   Wt 126 lb (57.2 kg)   SpO2 92%   BMI 24.61 kg/m²   TEMPERATURE: Current - Temp: 97.4 °F (36.3 °C); Max - Temp  Av.9 °F (36.6 °C)  Min: 97.4 °F (36.3 °C)  Max: 98.3 °F (36.8 °C)  RESPIRATIONS RANGE: Resp  Av.9  Min: 14  Max: 23  PULSE RANGE: Pulse  Av.4  Min: 81  Max: 91  BLOOD PRESSURE RANGE:  Systolic (88ZRV), FJW:735 , Min:109 , EBN:218  ; Diastolic (25IGG), HRM:34, Min:54, Max:99   PULSE OXIMETRY RANGE: SpO2  Av.1 %  Min: 92 %  Max: 98 %  24HR INTAKE/OUTPUT:    Intake/Output Summary (Last 24 hours) at 3/1/2023 1457  Last data filed at 3/1/2023 1243  Gross per 24 hour   Intake 360 ml   Output --   Net 360 ml     Constitutional: alert, appears stated age, and cooperative    Skin: Skin color, texture, turgor normal. No rashes or lesions    Head: Normocephalic, without obvious abnormality, atraumatic     Cardiovascular/Edema: regular rate and rhythm, S1, S2 normal, no murmur, click, rub or gallop    Respiratory: Lungs: clear to auscultation bilaterally    Abdomen: soft, non-tender; bowel sounds normal; no masses,  no organomegaly    Back: symmetric, no curvature. ROM normal. No CVA tenderness.     Extremities: edema +    Neuro:  Grossly normal    CBC:   Recent Labs     23  2345   WBC 6.2   HGB 12.6        BMP:    Recent Labs     23  2345 23  0633 23  1203   * 121* 121*   K 2.8* 2.8*  --    CL 89* 87*  --    CO2 20 18*  --    BUN 35* 35*  --    CREATININE 3.50* 3.49*  --    GLUCOSE 128* 173*  --      Lab Results   Component Value Date/Time    NITRU NEGATIVE 2023 01:05 AM    COLORU Yellow 2023 01:05 AM    PHUR 6.0 2023 01:05 AM    WBCUA 10 TO 20 2023 01:05 AM    RBCUA 0 TO 2 2023 01:05 AM    MUCUS 1+ 2019 08:01 AM    TRICHOMONAS NOT REPORTED 2019 08:01 AM    YEAST NOT REPORTED 2019 08:01 AM    BACTERIA None 2023 01:05 AM    CLARITYU Clear 2020 12:00 AM    SPECGRAV 1.007 2023 01:05 AM    LEUKOCYTESUR TRACE 2023 01:05 AM    UROBILINOGEN Normal 2023 01:05 AM    BILIRUBINUR NEGATIVE 03/01/2023 01:05 AM    BILIRUBINUR NEGATIVE 05/09/2012 11:18 AM    BLOODU Negative 12/01/2020 12:00 AM    GLUCOSEU NEGATIVE 03/01/2023 01:05 AM    GLUCOSEU 1+ 05/09/2012 11:18 AM    KETUA NEGATIVE 03/01/2023 01:05 AM    AMORPHOUS NOT REPORTED 03/27/2019 08:01 AM     Urine Sodium:     Lab Results   Component Value Date/Time    CALIXTO 53 03/01/2023 01:28 AM     Urine Chloride:    Lab Results   Component Value Date/Time    CLUR 23 02/24/2023 12:32 PM     Urine Osmolarity:   Lab Results   Component Value Date/Time    OSMOU 190 03/01/2023 01:28 AM     Urine Creatinine:     Lab Results   Component Value Date/Time    LABCREA 113.7 02/20/2020 12:00 AM     IMPRESSION/RECOMMENDATIONS:      1. Acute kidney injury with chronic kidney disease stage IV - most consistent withprogression of chronic kidney disease to stage V. However there may be component of prerenal azotemia. Plan:  Bicarbonate drip at 75 mL/h. Monitor urine output closely. Ultrasound to rule out hydronephrosis. We will recommend Alysis if no significant improvement in renal function tomorrow. Basic metabolic profile daily. 2.  Hyponatremia - hypervolemic and hypo-osmolar as suggested by serum osmolality 268 mOsm/kg. This suggest fluid retention consequent to advanced chronic kidney disease and treatment will be dialysis. Ultimately will benefit from fluid restriction 1.5 L/day. 3.  Systemic hypertension - initial presentation with elevated blood pressure deflects plasma volume expansion secondary to advanced CKD. 4.  Anion gap metabolic acidosis - secondary to uremia. We will start sodium bicarbonate 650 mg p.o. 3 times daily. 5.  Severe hypokalemia - secondary to poor intake. We will give potassium chloride 40 mEq IV. Prognosis is guarded. Thank you very much for the courtesy and confidence of this consultation.     Ayo Gardiner MD FACP  Attending Nephrologist  3/1/2023 2:41 PM

## 2023-03-01 NOTE — PROGRESS NOTES
Able to complete admission assessment with use of Uzbek ([place]) . Pt currently denies all symptoms, is pleasant, and resting in bed with  at bedside. All questions are answered.

## 2023-03-01 NOTE — PROGRESS NOTES
03/01/23 1123   Encounter Summary   Encounter Overview/Reason  Spiritual/Emotional Needs   Service Provided For: Patient and family together   Referral/Consult From: Rounding   Last Encounter  03/01/23   Complexity of Encounter Moderate   Spiritual/Emotional needs   Type Spiritual Support   Assessment/Intervention/Outcome   Assessment Calm;Coping   Intervention Prayer (assurance of)/[place];Sustaining Presence/Ministry of presence   Outcome Coping;Receptive

## 2023-03-01 NOTE — PROGRESS NOTES
03/01/23 1122   Encounter Summary   Encounter Overview/Reason  Attempted Encounter   Service Provided For: Patient not available  (PT not in room)

## 2023-03-01 NOTE — ED NOTES
Report given to RN from PCU.   Report method by phone   The following was reviewed with receiving RN:   Current vital signs:  BP (!) 218/89   Pulse 83   Temp 97.8 °F (36.6 °C) (Oral)   Resp 23   Wt 126 lb (57.2 kg)   SpO2 98%   BMI 24.61 kg/m²                MEWS Score: 2     Any medication or safety alerts were reviewed. Any pending diagnostics and notifications were also reviewed, as well as any safety concerns or issues, abnormal labs, abnormal imaging, and abnormal assessment findings. Questions were answered.          Roxie Quan RN  03/01/23 0145

## 2023-03-01 NOTE — H&P
[address] [facility] Internal Medicine  Beata Diaz MD; Chaparro Ortiz MD; Claudette Skye, MD; MD Kat Ruby MD; Crissy Cisse MD  [facility]   [address] [facility]                 Date:   3/1/2023  Patientname:  French Leslie  Date of admission:  2/28/2023 11:22 PM  MRN:   [id number]  Account:  [de-identified]  YOB: 1960  PCP:    Danielle Ellis MD  Room:   2081/2081-01  Code Status:    Full Code      Chief Complaint:     Chief Complaint   Patient presents with    Emesis    Other       History of Present Illness:     Arlene Leslie is a 60 y.o. Non- / non  female who presents with Emesis and is admitted to the hospital for the management of Acute kidney injury superimposed on chronic kidney disease ([facility] [address] Extensive medical history of CAD, CHF, CKD 4, DM and HTN. Patient and  only speaks German, communicated with video . Patient is being followed closely by nephrology as outpatient due to stage IV CKD. She presented with nausea and vomiting. She presented with nausea and vomiting. Initial lab work shows CR 3.5 which is actually improved from CR 4.17 on 12/24/2023. Electrolyte imbalances noted with sodium 123, potassium 2.8, magnesium 1.5. Placement initiated in ED. Patient is also hypertensive, primary cardiologist Dr. Eddie Obrien consulted and requested starting nifedipine. Troponin slightly elevated at 41 & 46. Patient denies chest pain or shortness of breath. Denies fever, chills, chest pain, cough, diarrhea, and urinary symptoms. There are no aggravating or alleviating factors. Symptoms are reported as acute, constant and moderate in severity. Communication was done with the help of video  [facility] #[id number]  Nausea is better  Vitals are stable      As per HPI rest of review of system negative.       Physical exam  General : Patient alert awake in no acute distress  HEENT : Atraumatic, normocephalic , oral mucosa membrane moist,  Eyes : Extraocular movements intact  Neck : Supple, range of motion intact,  Cardiovascular : Regular rate and rhythm, no murmur appreciated  Respiratory : Bilateral clear breath sounds, no wheezing crackles appreciated  Gastrointestinal  : Abdomen soft, nontender, bowel sounds present  Extremities : No pedal edema clubbing or cyanosis present  Skin : Warm and dry, no skin lesions appreciated  Psych : Mood normal     Past Medical History:     Past Medical History:   Diagnosis Date    CAD (coronary artery disease)     CHF (congestive heart failure) ([facility])     CKD (chronic kidney disease) stage 3, GFR 30-59 ml/min ([facility])     Diabetes mellitus ([facility])     Hyperkalemia     Hypertension     Microscopic hematuria     Proteinuria     Renal failure     Status post coronary artery bypass grafting 2010    Type II or unspecified type diabetes mellitus without mention of complication, not stated as uncontrolled     UTI (urinary tract infection)         Past Surgical History:     Past Surgical History:   Procedure Laterality Date    CARDIAC SURGERY      CATARACT EXTRACTION EXTRACAPSULAR W/ INTRAOCULAR LENS IMPLANTATION Right 09/01/2022    [facility]/Complex with VisionBlue and iris stretching    CATARACT REMOVAL Right 9/1/2022    EYE CATARACT EMULSIFICATION IOL IMPLANT w/ VISION BLUE AND PERIBULBAR BLOCK performed by Roberta Rose MD at [place] [address] GRAFT          Medications Prior to Admission:     Prior to Admission medications    Medication Sig Start Date End Date Taking?  Authorizing Provider   omeprazole (PRILOSEC) 40 MG delayed release capsule TAKE 1 CAPSULE BY MOUTH ONCE DAILY FOR 90 DAYS 11/27/22  Yes Historical Provider, MD   BISACODYL 5 MG EC tablet USE AS DIRECTED 12/10/21  Yes Historical Provider, MD   magnesium oxide (MAG-OX) 400 (241.3 Mg) MG TABS tablet TAKE 1 TABLET BY MOUTH ONCE DAILY WITH FOOD FOR 90 DAYS 10/26/20  Yes Historical Provider, MD   Sennosides-Docusate Sodium 8.6-50 MG CAPS Take 2 tablets by mouth daily   Yes Historical Provider, MD Moeand Revels 100 UNIT/ML injection pen Inject 42 Units into the skin daily 5/16/19  Yes Historical Provider, MD   sodium bicarbonate 650 MG tablet Take 650 mg by mouth daily 5/16/19  Yes Historical Provider, MD   ramipril (ALTACE) 2.5 MG capsule Take 1 capsule by mouth daily  Patient taking differently: Take 2.5 mg by mouth daily Hold 3/28/19  Yes Brittani Patel MD   carvedilol (COREG) 6.25 MG tablet Take 1 tablet by mouth 2 times daily  Patient taking differently: Take 3.125 mg by mouth 2 times daily 3/27/19  Yes Brittani Patel MD   pravastatin (PRAVACHOL) 20 MG tablet Take 20 mg by mouth nightly   Yes Historical Provider, MD   aspirin 81 MG EC tablet Take 162 mg by mouth daily    Yes Historical Provider, MD   potassium chloride (KLOR-CON M) 20 MEQ extended release tablet Take 2 tablets by mouth daily for 4 doses Stop Lokelma - take potassium Chloride 40mEq today and 40mEq tomorrow recheck potassium on Monday morning 2/24/23 2/28/23  Cindy [place] MD   ferrous sulfate (IRON 325) 325 (65 Fe) MG tablet Take by mouth daily    Historical Provider, MD   vitamin D3 (CHOLECALCIFEROL) 125 MCG (5000 UT) TABS tablet 1,000 Units daily    Historical Provider, MD   TRUE METRIX BLOOD GLUCOSE TEST strip USE AS DIRECTED ONCE DAILY 10/26/20   Historical Provider, MD   TRUEplus Lancets 30G MISC USE TWICE DAILY 10/26/20   Historical Provider, MD   Blood Pressure Monitor KIT Take Bp daily 11/23/20   Cindy Peter MD        Allergies:     Ace inhibitors    Social History:     Tobacco:    reports that she has never smoked. She has never used smokeless tobacco.  Alcohol:      reports no history of alcohol use. Drug Use:  reports no history of drug use.     Family History:     Family History   Problem Relation Age of Onset    Heart Attack Mother Diabetes Mother     Diabetes Father        Investigations:      Laboratory Testing:  Recent Results (from the past 24 hour(s))   EKG 12 Lead    Collection Time: 02/28/23 11:37 PM   Result Value Ref Range    Ventricular Rate 85 BPM    Atrial Rate 85 BPM    P-R Interval 152 ms    QRS Duration 140 ms    Q-T Interval 444 ms    QTc Calculation (Bazett) 528 ms    P Axis 57 degrees    R Axis 0 degrees    T Axis 47 degrees   CBC with Auto Differential    Collection Time: 02/28/23 11:45 PM   Result Value Ref Range    WBC 6.2 3.5 - 11.0 k/uL    RBC 4.70 4.0 - 5.2 m/uL    Hemoglobin 12.6 12.0 - 16.0 g/dL    Hematocrit 37.4 36 - 46 %    MCV 79.4 (L) 80 - 100 fL    MCH 26.8 26 - 34 pg    MCHC 33.7 31 - 37 g/dL    RDW 14.8 11.5 - 14.9 %    Platelets 327 150 - 450 k/uL    MPV 7.8 6.0 - 12.0 fL    Seg Neutrophils 57 36 - 66 %    Lymphocytes 32 24 - 44 %    Monocytes 9 (H) 1 - 7 %    Eosinophils % 2 0 - 4 %    Basophils 0 0 - 2 %    Segs Absolute 3.50 1.3 - 9.1 k/uL    Absolute Lymph # 2.00 1.0 - 4.8 k/uL    Absolute Mono # 0.60 0.1 - 1.3 k/uL    Absolute Eos # 0.10 0.0 - 0.4 k/uL    Basophils Absolute 0.00 0.0 - 0.2 k/uL   Comprehensive Metabolic Panel    Collection Time: 02/28/23 11:45 PM   Result Value Ref Range    Glucose 128 (H) 70 - 99 mg/dL    BUN 35 (H) 8 - 23 mg/dL    Creatinine 3.50 (H) 0.50 - 0.90 mg/dL    Est, Glom Filt Rate 14 (L) >60 mL/min/1.73m2    Calcium 7.8 (L) 8.6 - 10.4 mg/dL    Sodium 123 (L) 135 - 144 mmol/L    Potassium 2.8 (LL) 3.7 - 5.3 mmol/L    Chloride 89 (L) 98 - 107 mmol/L    CO2 20 20 - 31 mmol/L    Anion Gap 14 9 - 17 mmol/L    Alkaline Phosphatase 160 (H) 35 - 104 U/L    ALT 21 5 - 33 U/L    AST 45 (H) <32 U/L    Total Bilirubin 0.4 0.3 - 1.2 mg/dL    Total Protein 7.0 6.4 - 8.3 g/dL    Albumin 3.1 (L) 3.5 - 5.2 g/dL   Lipase    Collection Time: 02/28/23 11:45 PM   Result Value Ref Range    Lipase 80 (H) 13 - 60 U/L   Lactic Acid    Collection Time: 02/28/23 11:45 PM   Result Value Ref Range     Lactic Acid 0.9 0.5 - 2.2 mmol/L   Troponin    Collection Time: 02/28/23 11:45 PM   Result Value Ref Range    Troponin, High Sensitivity 41 (H) 0 - 14 ng/L   Magnesium    Collection Time: 02/28/23 11:45 PM   Result Value Ref Range    Magnesium 1.5 (L) 1.6 - 2.6 mg/dL   Urinalysis with Reflex to Culture    Collection Time: 03/01/23  1:05 AM    Specimen: Urine   Result Value Ref Range    Color, UA Yellow Yellow    Turbidity UA Clear Clear    Glucose, Ur NEGATIVE NEGATIVE    Bilirubin Urine NEGATIVE NEGATIVE    Ketones, Urine NEGATIVE NEGATIVE    Specific Gravity, UA 1.007 1.000 - 1.030    Urine Hgb SMALL (A) NEGATIVE    pH, UA 6.0 5.0 - 8.0    Protein, UA 3+ (A) NEGATIVE    Urobilinogen, Urine Normal Normal    Nitrite, Urine NEGATIVE NEGATIVE    Leukocyte Esterase, Urine TRACE (A) NEGATIVE   Microscopic Urinalysis    Collection Time: 03/01/23  1:05 AM   Result Value Ref Range    WBC, UA 10 TO 20 /HPF    RBC, UA 0 TO 2 /HPF    Casts UA 0 TO 2 /LPF    Epithelial Cells UA 0 TO 2 /HPF    Bacteria, UA None None   Troponin    Collection Time: 03/01/23  1:22 AM   Result Value Ref Range    Troponin, High Sensitivity 46 (H) 0 - 14 ng/L   Osmolality    Collection Time: 03/01/23  1:22 AM   Result Value Ref Range    Serum Osmolality 268 (L) 275 - 295 mOsm/kg   SODIUM, URINE, RANDOM    Collection Time: 03/01/23  1:28 AM   Result Value Ref Range    Sodium,Ur 53 mmol/L   OSMOLALITY, URINE    Collection Time: 03/01/23  1:28 AM   Result Value Ref Range    Osmolality, Ur 190 80 - 1300 mOsm/kg   POC Glucose Fingerstick    Collection Time: 03/01/23  2:34 AM   Result Value Ref Range    POC Glucose 117 (H) 65 - 105 mg/dL   POC Glucose Fingerstick    Collection Time: 03/01/23  6:16 AM   Result Value Ref Range    POC Glucose 161 (H) 65 - 105 mg/dL   Comprehensive Metabolic Panel w/ Reflex to MG    Collection Time: 03/01/23  6:33 AM   Result Value Ref Range    Glucose 173 (H) 70 - 99 mg/dL    BUN 35 (H) 8 - 23 mg/dL    Creatinine 3.49 (H) 0.50 - 0.90 mg/dL    Est, Glom Filt Rate 14 (L) >60 mL/min/1.73m2    Calcium 8.0 (L) 8.6 - 10.4 mg/dL    Sodium 121 (L) 135 - 144 mmol/L    Potassium 2.8 (LL) 3.7 - 5.3 mmol/L    Chloride 87 (L) 98 - 107 mmol/L    CO2 18 (L) 20 - 31 mmol/L    Anion Gap 16 9 - 17 mmol/L    Alkaline Phosphatase 149 (H) 35 - 104 U/L    ALT 23 5 - 33 U/L    AST 40 (H) <32 U/L    Total Bilirubin 0.5 0.3 - 1.2 mg/dL    Total Protein 6.4 6.4 - 8.3 g/dL    Albumin 3.0 (L) 3.5 - 5.2 g/dL   Magnesium    Collection Time: 03/01/23  6:33 AM   Result Value Ref Range    Magnesium 1.8 1.6 - 2.6 mg/dL   Troponin    Collection Time: 03/01/23  6:33 AM   Result Value Ref Range    Troponin, High Sensitivity 50 (H) 0 - 14 ng/L   POC Glucose Fingerstick    Collection Time: 03/01/23 11:19 AM   Result Value Ref Range    POC Glucose 152 (H) 65 - 105 mg/dL       Imaging/Diagnostics:  No results found. Plan:     Patient status inpatient in the Progressive Unit/Step down    OREN superimposed on CKD4  -Creatinine cr 3.5; Baseline 2.0  -history of CKD  -Nephrology consulted in ED  -NS infusion  -hold diuretics/nephrotoxic medications  -monitor BMP    Hypokalemia  -K+ replaced with 1x dose  -recheck BMP in am    Hypomagnesemia  -Mag level - 1.5  - 1 gm IV replacement ordered  -monitor magnesium level daily    Hyponatremia  -Sodium level -123  -check sodium level every 4 hours for now  -IV NS at 75ml/hr per nephrology    HTN  -cardiology consulted in ED  -started on Nifedipine, hydralazine prn  -Continue home BP meds  -Monitor VS    Nausea/vomiting  -compazine prn  -consider abdominal imaging if unresolved    Nausea vomiting is better now, will give 40 p.o. potassium as well, potassium was 2.8 this morning,  If no improvement in the nausea and vomiting, by today evening will do imaging,  Sodium worse at 121, will inform nephrology,  Seen by cardiology no intervention at this point  Creatinine 3.49.   Valere Gaster, MD  3/1/2023  12:30 PM      Please note that this chart was generated using voice recognition Dragon dictation software. Although every effort was made to ensure the accuracy of this automated transcription, some errors in transcription may have occurred. Rylee Roberts [address] [place] [address].    Phone [phone]

## 2023-03-01 NOTE — PROGRESS NOTES
DON [facility] Internal Medicine  Carter aCballero MD; Raheel Almanzar MD; John Reyna MD; MD Kunal Balbuena MD; Letitia Hoffman MD  ABA EAGLE [facility] Internal Medicine   [address] [facility]                 Date:   3/1/2023  Patientname:  Mikael Meier  Date of admission:  2/28/2023 11:22 PM  MRN:   [id number]  Account:  [de-identified]  YOB: 1960  PCP:    Christian Cervantes MD  Room:   2081/2081-01  Code Status:    Full Code      Chief Complaint:     Chief Complaint   Patient presents with    Emesis    Other       History of Present Illness:     Arlene Meier is a 60 y.o. Non- / non  female who presents with Emesis and is admitted to the hospital for the management of Acute kidney injury superimposed on chronic kidney disease ([facility] Extensive medical history of CAD, CHF, CKD 4, DM and HTN. Patient and  only speaks Urdu, communicated with video . Patient is being followed closely by nephrology as outpatient due to stage IV CKD. She presented with nausea and vomiting. She presented with nausea and vomiting. Initial lab work shows CR 3.5 which is actually improved from CR 4.17 on 12/24/2023. Electrolyte imbalances noted with sodium 123, potassium 2.8, magnesium 1.5. Placement initiated in ED. Patient is also hypertensive, primary cardiologist Dr. Graham Gallagher consulted and requested starting nifedipine. Troponin slightly elevated at 41 & 46. Patient denies chest pain or shortness of breath. Denies fever, chills, chest pain, cough, diarrhea, and urinary symptoms. There are no aggravating or alleviating factors. Symptoms are reported as acute, constant and moderate in severity.   Past Medical History:     Past Medical History:   Diagnosis Date    CAD (coronary artery disease)     CHF (congestive heart failure) (HCC)     CKD (chronic kidney disease) stage 3, GFR 30-59 ml/min (HCC)     Diabetes mellitus ([facility] Hyperkalemia     Hypertension     Microscopic hematuria     Proteinuria     Renal failure     Status post coronary artery bypass grafting 2010    Type II or unspecified type diabetes mellitus without mention of complication, not stated as uncontrolled     UTI (urinary tract infection)         Past Surgical History:     Past Surgical History:   Procedure Laterality Date    CARDIAC SURGERY      CATARACT EXTRACTION EXTRACAPSULAR W/ INTRAOCULAR LENS IMPLANTATION Right 09/01/2022    [facility]/Complex with VisionBlue and iris stretching    CATARACT REMOVAL Right 9/1/2022    EYE CATARACT EMULSIFICATION IOL IMPLANT w/ VISION BLUE AND PERIBULBAR BLOCK performed by Rand Jackson MD at [address] [place] [address]          Medications Prior to Admission:     Prior to Admission medications    Medication Sig Start Date End Date Taking?  Authorizing Provider   omeprazole (PRILOSEC) 40 MG delayed release capsule TAKE 1 CAPSULE BY MOUTH ONCE DAILY FOR 90 DAYS 11/27/22  Yes Historical Provider, MD   BISACODYL 5 MG EC tablet USE AS DIRECTED 12/10/21  Yes Historical Provider, MD   magnesium oxide (MAG-OX) 400 (241.3 Mg) MG TABS tablet TAKE 1 TABLET BY MOUTH ONCE DAILY WITH FOOD FOR 90 DAYS 10/26/20  Yes Historical Provider, MD   Sennosides-Docusate Sodium 8.6-50 MG CAPS Take 2 tablets by mouth daily   Yes Historical Provider, MD Ivanna Jade KWIKPEN 100 UNIT/ML injection pen Inject 42 Units into the skin daily 5/16/19  Yes Historical Provider, MD   sodium bicarbonate 650 MG tablet Take 650 mg by mouth daily 5/16/19  Yes Historical Provider, MD   ramipril (ALTACE) 2.5 MG capsule Take 1 capsule by mouth daily  Patient taking differently: Take 2.5 mg by mouth daily Hold 3/28/19  Yes Krista Weinstein MD   carvedilol (COREG) 6.25 MG tablet Take 1 tablet by mouth 2 times daily  Patient taking differently: Take 3.125 mg by mouth 2 times daily 3/27/19  Yes Krista Weinstein MD   pravastatin (PRAVACHOL) 20 MG tablet Take 20 mg by mouth nightly   Yes Historical Provider, MD   aspirin 81 MG EC tablet Take 162 mg by mouth daily    Yes Historical Provider, MD   potassium chloride (KLOR-CON M) 20 MEQ extended release tablet Take 2 tablets by mouth daily for 4 doses Stop Lokelma - take potassium Chloride 40mEq today and 40mEq tomorrow recheck potassium on Monday morning 2/24/23 2/28/23  Annelise Sigala MD   ferrous sulfate (IRON 325) 325 (65 Fe) MG tablet Take by mouth daily    Historical Provider, MD   vitamin D3 (CHOLECALCIFEROL) 125 MCG (5000 UT) TABS tablet 1,000 Units daily    Historical Provider, MD   TRUE METRIX BLOOD GLUCOSE TEST strip USE AS DIRECTED ONCE DAILY 10/26/20   Historical Provider, MD   TRUEplus Lancets 30G MISC USE TWICE DAILY 10/26/20   Historical Provider, MD   Blood Pressure Monitor KIT Take Bp daily 11/23/20   Annelise Sigala MD        Allergies:     Ace inhibitors    Social History:     Tobacco:    reports that she has never smoked. She has never used smokeless tobacco.  Alcohol:      reports no history of alcohol use. Drug Use:  reports no history of drug use.     Family History:     Family History   Problem Relation Age of Onset    Heart Attack Mother     Diabetes Mother     Diabetes Father        Investigations:      Laboratory Testing:  Recent Results (from the past 24 hour(s))   EKG 12 Lead    Collection Time: 02/28/23 11:37 PM   Result Value Ref Range    Ventricular Rate 85 BPM    Atrial Rate 85 BPM    P-R Interval 152 ms    QRS Duration 140 ms    Q-T Interval 444 ms    QTc Calculation (Bazett) 528 ms    P Axis 57 degrees    R Axis 0 degrees    T Axis 47 degrees   CBC with Auto Differential    Collection Time: 02/28/23 11:45 PM   Result Value Ref Range    WBC 6.2 3.5 - 11.0 k/uL    RBC 4.70 4.0 - 5.2 m/uL    Hemoglobin 12.6 12.0 - 16.0 g/dL    Hematocrit 37.4 36 - 46 %    MCV 79.4 (L) 80 - 100 fL    MCH 26.8 26 - 34 pg    MCHC 33.7 31 - 37 g/dL    RDW 14.8 11.5 - 14.9 % Platelets 097 520 - 604 k/uL    MPV 7.8 6.0 - 12.0 fL    Seg Neutrophils 57 36 - 66 %    Lymphocytes 32 24 - 44 %    Monocytes 9 (H) 1 - 7 %    Eosinophils % 2 0 - 4 %    Basophils 0 0 - 2 %    Segs Absolute 3.50 1.3 - 9.1 k/uL    Absolute Lymph # 2.00 1.0 - 4.8 k/uL    Absolute Mono # 0.60 0.1 - 1.3 k/uL    Absolute Eos # 0.10 0.0 - 0.4 k/uL    Basophils Absolute 0.00 0.0 - 0.2 k/uL   Comprehensive Metabolic Panel    Collection Time: 02/28/23 11:45 PM   Result Value Ref Range    Glucose 128 (H) 70 - 99 mg/dL    BUN 35 (H) 8 - 23 mg/dL    Creatinine 3.50 (H) 0.50 - 0.90 mg/dL    Est, Glom Filt Rate 14 (L) >60 mL/min/1.73m2    Calcium 7.8 (L) 8.6 - 10.4 mg/dL    Sodium 123 (L) 135 - 144 mmol/L    Potassium 2.8 (LL) 3.7 - 5.3 mmol/L    Chloride 89 (L) 98 - 107 mmol/L    CO2 20 20 - 31 mmol/L    Anion Gap 14 9 - 17 mmol/L    Alkaline Phosphatase 160 (H) 35 - 104 U/L    ALT 21 5 - 33 U/L    AST 45 (H) <32 U/L    Total Bilirubin 0.4 0.3 - 1.2 mg/dL    Total Protein 7.0 6.4 - 8.3 g/dL    Albumin 3.1 (L) 3.5 - 5.2 g/dL   Lipase    Collection Time: 02/28/23 11:45 PM   Result Value Ref Range    Lipase 80 (H) 13 - 60 U/L   Lactic Acid    Collection Time: 02/28/23 11:45 PM   Result Value Ref Range    Lactic Acid 0.9 0.5 - 2.2 mmol/L   Troponin    Collection Time: 02/28/23 11:45 PM   Result Value Ref Range    Troponin, High Sensitivity 41 (H) 0 - 14 ng/L   Magnesium    Collection Time: 02/28/23 11:45 PM   Result Value Ref Range    Magnesium 1.5 (L) 1.6 - 2.6 mg/dL   Troponin    Collection Time: 03/01/23  1:22 AM   Result Value Ref Range    Troponin, High Sensitivity 46 (H) 0 - 14 ng/L   SODIUM, URINE, RANDOM    Collection Time: 03/01/23  1:28 AM   Result Value Ref Range    Sodium,Ur 53 mmol/L   POC Glucose Fingerstick    Collection Time: 03/01/23  2:34 AM   Result Value Ref Range    POC Glucose 117 (H) 65 - 105 mg/dL       Imaging/Diagnostics:  No results found.       Plan:     Patient status inpatient in the Progressive Unit/Step down    OREN superimposed on CKD4  -Creatinine cr 3.5; Baseline 2.0  -history of CKD  -Nephrology consulted in ED  -NS infusion  -hold diuretics/nephrotoxic medications  -monitor BMP    Hypokalemia  -K+ replaced with 1x dose  -recheck BMP in am    Hypomagnesemia  -Mag level - 1.5  - 1 gm IV replacement ordered  -monitor magnesium level daily    Hyponatremia  -Sodium level -123  -check sodium level every 4 hours for now  -IV NS at 75ml/hr per nephrology    HTN  -cardiology consulted in ED  -started on Nifedipine, hydralazine prn  -Continue home BP meds  -Monitor VS    Nausea/vomiting  -compazine prn  -consider abdominal imaging if unresolved      BRADY Raphael - NP  3/1/2023  5:42 AM      Please note that this chart was generated using voice recognition Dragon dictation software. Although every effort was made to ensure the accuracy of this automated transcription, some errors in transcription may have occurred. [address]  Carter Roberts [address] [place] [address].    Phone [phone]

## 2023-03-01 NOTE — PLAN OF CARE
Problem: Pain  Goal: Verbalizes/displays adequate comfort level or baseline comfort level  3/1/2023 1529 by Lakesha Dickey RN  Outcome: Progressing     Problem: Skin/Tissue Integrity  Goal: Absence of new skin breakdown  Description: 1.  Monitor for areas of redness and/or skin breakdown  2.  Assess vascular access sites hourly  3.  Every 4-6 hours minimum:  Change oxygen saturation probe site  4.  Every 4-6 hours:  If on nasal continuous positive airway pressure, respiratory therapy assess nares and determine need for appliance change or resting period.  Outcome: Progressing     Problem: ABCDS Injury Assessment  Goal: Absence of physical injury  Outcome: Progressing     Problem: Safety - Adult  Goal: Free from fall injury  Outcome: Progressing

## 2023-03-01 NOTE — CARE COORDINATION
Case Management Assessment  Initial Evaluation    Date/Time of Evaluation: 3/1/2023 2:43 PM  Assessment Completed by: Winnie Bridges RN    If patient is discharged prior to next notation, then this note serves as note for discharge by case management. Patient Name: David Abel                   YOB: 1960  Diagnosis: Hypokalemia [E87.6]  Hyponatremia [E87.1]  Uremia [N19]  Acute kidney injury superimposed on chronic kidney disease ([facility] [N17.9, N18.9]  Stage 5 chronic kidney disease not on chronic dialysis [facility]) [N18.5]                   Date / Time: 2/28/2023 11:22 PM    Patient Admission Status: Inpatient   Readmission Risk (Low < 19, Mod (19-27), High > 27): Readmission Risk Score: 15.1    Current PCP: Mariana Pekrins MD  PCP verified by CM? Yes    Chart Reviewed: Yes      History Provided by: Patient, Spouse (Writer used , Pt/, speak Pashto)  Patient Orientation: Alert and Oriented    Patient Cognition: Alert    Hospitalization in the last 30 days (Readmission):  No    If yes, Readmission Assessment in CM Navigator will be completed. Advance Directives:      Code Status: Full Code   Patient's Primary Decision Maker is: Named in [address] [place] [address]      Discharge Planning:    Patient lives with:   Type of Home: Other (Comment) ([facility] in [place])  Primary Care Giver:    Patient Support Systems include: Spouse/Significant Other   Current Financial resources: Medicaid  Current community resources: None  Current services prior to admission: None            Current DME:              Type of Home Care services:  None    ADLS  Prior functional level: Independent in ADLs/IADLs  Current functional level: Independent in ADLs/IADLs    PT AM-PAC:   /24  OT AM-PAC:   /24    Family can provide assistance at DC: Yes  Would you like Case Management to discuss the discharge plan with any other family members/significant others, and if so, who?  No  Plans to Return to Present Housing: Yes  Other Identified Issues/Barriers to RETURNING to current housing: Language Travis, May need Dialysis  Potential Assistance needed at discharge: Prescription Assistance            Potential DME:    Patient expects to discharge to: Other (comment) (Back to [facility])  Plan for transportation at discharge:      Financial    Payor: [address] [place] [address]  [address] / Plan: [address] [place] [address] [address] / Product Type: *No Product type* /     Does insurance require precert for SNF: Yes    Potential assistance Purchasing Medications: Yes  Meds-to-Beds request:        Mor Anderson, [address] [address]  Phone: [phone] Fax: [phone]      Notes:    Factors facilitating achievement of predicted outcomes: Family support, Cooperative, and Pleasant    Barriers to discharge: Needs , May Need Medication assistance , has not worked since August, May need Dialysis    Additional Case Management Notes: 3/1/23 888 Cindy Kelly Is from [address], Needs ,Staying w/ , at [facility] on [address] [facility]. No DME, No VNS. May need med assist. K+2.8, , Cr 3.49. Nephro/Cardio following, ? Dialysis. , drives Ellie Link of all needs at this time. //KB    The Plan for Transition of Care is related to the following treatment goals of Hypokalemia [E87.6]  Hyponatremia [E87.1]  Uremia [N19]  Acute kidney injury superimposed on chronic kidney disease (HCC) [N17.9, N18.9]  Stage 5 chronic kidney disease not on chronic dialysis ([facility] [U84.1]    IF APPLICABLE: The Patient and/or patient representative Arlene and her family were provided with a choice of provider and agrees with the discharge plan.  Freedom of choice list with basic dialogue that supports the patient's individualized plan of care/goals and shares the quality data associated with the providers was provided to: Patient   Patient Representative Name:       The Patient and/or Patient Representative Agree with the Discharge Plan? Yes    Dorcas Vidales RN  Case Management Department  Ph: [phone] Fax: [phone]

## 2023-03-01 NOTE — PROGRESS NOTES
RN notified Dr. Aquino Inks of K+ of 2.8 and orders were given for 40meq IV potassium, orders placed.

## 2023-03-02 ENCOUNTER — APPOINTMENT (OUTPATIENT)
Dept: ULTRASOUND IMAGING | Age: 63
DRG: 469 | End: 2023-03-02
Payer: COMMERCIAL

## 2023-03-02 ENCOUNTER — APPOINTMENT (OUTPATIENT)
Dept: NON INVASIVE DIAGNOSTICS | Age: 63
DRG: 469 | End: 2023-03-02
Payer: COMMERCIAL

## 2023-03-02 ENCOUNTER — APPOINTMENT (OUTPATIENT)
Dept: GENERAL RADIOLOGY | Age: 63
DRG: 469 | End: 2023-03-02
Payer: COMMERCIAL

## 2023-03-02 ENCOUNTER — APPOINTMENT (OUTPATIENT)
Dept: INTERVENTIONAL RADIOLOGY/VASCULAR | Age: 63
DRG: 469 | End: 2023-03-02
Payer: COMMERCIAL

## 2023-03-02 LAB
ABSOLUTE EOS #: 0.3 K/UL (ref 0–0.4)
ABSOLUTE LYMPH #: 2.4 K/UL (ref 1–4.8)
ABSOLUTE MONO #: 0.6 K/UL (ref 0.1–1.3)
ALBUMIN SERPL-MCNC: 2.6 G/DL (ref 3.5–5.2)
ALP SERPL-CCNC: 127 U/L (ref 35–104)
ALT SERPL-CCNC: 25 U/L (ref 5–33)
ANION GAP SERPL CALCULATED.3IONS-SCNC: 11 MMOL/L (ref 9–17)
AST SERPL-CCNC: 50 U/L
BACTERIA: NORMAL
BASOPHILS # BLD: 0 % (ref 0–2)
BASOPHILS ABSOLUTE: 0 K/UL (ref 0–0.2)
BILIRUB SERPL-MCNC: 0.3 MG/DL (ref 0.3–1.2)
BILIRUBIN URINE: NEGATIVE
BUN SERPL-MCNC: 41 MG/DL (ref 8–23)
CALCIUM SERPL-MCNC: 7.8 MG/DL (ref 8.6–10.4)
CASTS UA: NORMAL /LPF
CHLORIDE SERPL-SCNC: 92 MMOL/L (ref 98–107)
CO2 SERPL-SCNC: 20 MMOL/L (ref 20–31)
COLOR: YELLOW
CREAT SERPL-MCNC: 4.44 MG/DL (ref 0.5–0.9)
CREATININE URINE: 57.1 MG/DL (ref 28–217)
EOSINOPHILS RELATIVE PERCENT: 4 % (ref 0–4)
EPITHELIAL CELLS UA: NORMAL /HPF
GFR SERPL CREATININE-BSD FRML MDRD: 11 ML/MIN/1.73M2
GLUCOSE BLD-MCNC: 146 MG/DL (ref 65–105)
GLUCOSE BLD-MCNC: 175 MG/DL (ref 65–105)
GLUCOSE BLD-MCNC: 191 MG/DL (ref 65–105)
GLUCOSE BLD-MCNC: 66 MG/DL (ref 65–105)
GLUCOSE BLD-MCNC: 96 MG/DL (ref 65–105)
GLUCOSE SERPL-MCNC: 76 MG/DL (ref 70–99)
GLUCOSE UR STRIP.AUTO-MCNC: NEGATIVE MG/DL
HBV CORE IGM SER QL: NONREACTIVE
HBV SURFACE AG SER QL: NONREACTIVE
HCT VFR BLD AUTO: 32.4 % (ref 36–46)
HCV AB SER QL: REACTIVE
HGB BLD-MCNC: 11 G/DL (ref 12–16)
INR PPP: 1
KETONES UR STRIP.AUTO-MCNC: NEGATIVE MG/DL
LEUKOCYTE ESTERASE UR QL STRIP.AUTO: NEGATIVE
LV EF: 50 %
LVEF MODALITY: NORMAL
LYMPHOCYTES # BLD: 38 % (ref 24–44)
MAGNESIUM SERPL-MCNC: 2.2 MG/DL (ref 1.6–2.6)
MCH RBC QN AUTO: 26.7 PG (ref 26–34)
MCHC RBC AUTO-ENTMCNC: 33.9 G/DL (ref 31–37)
MCV RBC AUTO: 78.9 FL (ref 80–100)
MICROORGANISM SPEC CULT: NO GROWTH
MONOCYTES # BLD: 10 % (ref 1–7)
NITRITE UR QL STRIP.AUTO: NEGATIVE
PDW BLD-RTO: 15.2 % (ref 11.5–14.9)
PLATELET # BLD AUTO: 299 K/UL (ref 150–450)
PMV BLD AUTO: 7.5 FL (ref 6–12)
POTASSIUM SERPL-SCNC: 3.4 MMOL/L (ref 3.7–5.3)
PROT SERPL-MCNC: 5.8 G/DL (ref 6.4–8.3)
PROT UR STRIP.AUTO-MCNC: 5 MG/DL (ref 5–8)
PROT UR STRIP.AUTO-MCNC: ABNORMAL MG/DL
PROTHROMBIN TIME: 13.8 SEC (ref 11.8–14.6)
RBC # BLD: 4.1 M/UL (ref 4–5.2)
RBC CLUMPS #/AREA URNS AUTO: NORMAL /HPF
SEG NEUTROPHILS: 48 % (ref 36–66)
SEGMENTED NEUTROPHILS ABSOLUTE COUNT: 3.1 K/UL (ref 1.3–9.1)
SODIUM SERPL-SCNC: 121 MMOL/L (ref 135–144)
SODIUM SERPL-SCNC: 122 MMOL/L (ref 135–144)
SODIUM SERPL-SCNC: 123 MMOL/L (ref 135–144)
SODIUM SERPL-SCNC: 127 MMOL/L (ref 135–144)
SPECIFIC GRAVITY UA: 1.01 (ref 1–1.03)
SPECIMEN DESCRIPTION: NORMAL
TOTAL PROTEIN, URINE: 384 MG/DL
TURBIDITY: CLEAR
URINE HGB: ABNORMAL
URINE TOTAL PROTEIN CREATININE RATIO: 6.73 (ref 0–0.2)
UROBILINOGEN, URINE: NORMAL
WBC # BLD AUTO: 6.4 K/UL (ref 3.5–11)
WBC UA: NORMAL /HPF

## 2023-03-02 PROCEDURE — 83735 ASSAY OF MAGNESIUM: CPT

## 2023-03-02 PROCEDURE — 76770 US EXAM ABDO BACK WALL COMP: CPT

## 2023-03-02 PROCEDURE — 86705 HEP B CORE ANTIBODY IGM: CPT

## 2023-03-02 PROCEDURE — 86803 HEPATITIS C AB TEST: CPT

## 2023-03-02 PROCEDURE — 82947 ASSAY GLUCOSE BLOOD QUANT: CPT

## 2023-03-02 PROCEDURE — 82570 ASSAY OF URINE CREATININE: CPT

## 2023-03-02 PROCEDURE — 6370000000 HC RX 637 (ALT 250 FOR IP): Performed by: INTERNAL MEDICINE

## 2023-03-02 PROCEDURE — 71045 X-RAY EXAM CHEST 1 VIEW: CPT

## 2023-03-02 PROCEDURE — 84295 ASSAY OF SERUM SODIUM: CPT

## 2023-03-02 PROCEDURE — 76705 ECHO EXAM OF ABDOMEN: CPT

## 2023-03-02 PROCEDURE — 85025 COMPLETE CBC W/AUTO DIFF WBC: CPT

## 2023-03-02 PROCEDURE — C8929 TTE W OR WO FOL WCON,DOPPLER: HCPCS

## 2023-03-02 PROCEDURE — 99233 SBSQ HOSP IP/OBS HIGH 50: CPT | Performed by: INTERNAL MEDICINE

## 2023-03-02 PROCEDURE — 80053 COMPREHEN METABOLIC PANEL: CPT

## 2023-03-02 PROCEDURE — 2060000000 HC ICU INTERMEDIATE R&B

## 2023-03-02 PROCEDURE — 2580000003 HC RX 258: Performed by: NURSE PRACTITIONER

## 2023-03-02 PROCEDURE — 6370000000 HC RX 637 (ALT 250 FOR IP): Performed by: NURSE PRACTITIONER

## 2023-03-02 PROCEDURE — 6360000004 HC RX CONTRAST MEDICATION: Performed by: INTERNAL MEDICINE

## 2023-03-02 PROCEDURE — 6360000002 HC RX W HCPCS: Performed by: NURSE PRACTITIONER

## 2023-03-02 PROCEDURE — 84156 ASSAY OF PROTEIN URINE: CPT

## 2023-03-02 PROCEDURE — 51702 INSERT TEMP BLADDER CATH: CPT

## 2023-03-02 PROCEDURE — 87340 HEPATITIS B SURFACE AG IA: CPT

## 2023-03-02 PROCEDURE — 81001 URINALYSIS AUTO W/SCOPE: CPT

## 2023-03-02 PROCEDURE — 85610 PROTHROMBIN TIME: CPT

## 2023-03-02 PROCEDURE — 36415 COLL VENOUS BLD VENIPUNCTURE: CPT

## 2023-03-02 RX ORDER — INSULIN GLARGINE 100 [IU]/ML
20 INJECTION, SOLUTION SUBCUTANEOUS DAILY
Status: DISCONTINUED | OUTPATIENT
Start: 2023-03-03 | End: 2023-03-07 | Stop reason: HOSPADM

## 2023-03-02 RX ADMIN — HEPARIN SODIUM 5000 UNITS: 5000 INJECTION INTRAVENOUS; SUBCUTANEOUS at 15:22

## 2023-03-02 RX ADMIN — HEPARIN SODIUM 5000 UNITS: 5000 INJECTION INTRAVENOUS; SUBCUTANEOUS at 20:56

## 2023-03-02 RX ADMIN — PRAVASTATIN SODIUM 20 MG: 20 TABLET ORAL at 20:56

## 2023-03-02 RX ADMIN — MAGNESIUM OXIDE TAB 400 MG (241.3 MG ELEMENTAL MG) 400 MG: 400 (241.3 MG) TAB at 07:53

## 2023-03-02 RX ADMIN — FERROUS SULFATE TAB 325 MG (65 MG ELEMENTAL FE) 325 MG: 325 (65 FE) TAB at 07:53

## 2023-03-02 RX ADMIN — SODIUM BICARBONATE 650 MG: 650 TABLET ORAL at 15:22

## 2023-03-02 RX ADMIN — PANTOPRAZOLE SODIUM 40 MG: 40 TABLET, DELAYED RELEASE ORAL at 05:56

## 2023-03-02 RX ADMIN — CARVEDILOL 6.25 MG: 6.25 TABLET, FILM COATED ORAL at 16:30

## 2023-03-02 RX ADMIN — SODIUM BICARBONATE 650 MG: 650 TABLET ORAL at 07:53

## 2023-03-02 RX ADMIN — HEPARIN SODIUM 5000 UNITS: 5000 INJECTION INTRAVENOUS; SUBCUTANEOUS at 05:56

## 2023-03-02 RX ADMIN — SODIUM BICARBONATE 650 MG: 650 TABLET ORAL at 20:56

## 2023-03-02 RX ADMIN — ASPIRIN 162 MG: 81 TABLET, COATED ORAL at 07:53

## 2023-03-02 RX ADMIN — PERFLUTREN 3 ML: 6.52 INJECTION, SUSPENSION INTRAVENOUS at 15:00

## 2023-03-02 RX ADMIN — CARVEDILOL 6.25 MG: 6.25 TABLET, FILM COATED ORAL at 07:53

## 2023-03-02 RX ADMIN — SODIUM CHLORIDE, PRESERVATIVE FREE 10 ML: 5 INJECTION INTRAVENOUS at 20:56

## 2023-03-02 NOTE — PLAN OF CARE
Problem: Discharge Planning  Goal: Discharge to home or other facility with appropriate resources  Outcome: Progressing     Problem: Pain  Goal: Verbalizes/displays adequate comfort level or baseline comfort level  3/2/2023 1556 by Abbi Giang, RN  Outcome: Progressing     Problem: Skin/Tissue Integrity  Goal: Absence of new skin breakdown  Description: 1. Monitor for areas of redness and/or skin breakdown  2. Assess vascular access sites hourly  3. Every 4-6 hours minimum:  Change oxygen saturation probe site  4. Every 4-6 hours:  If on nasal continuous positive airway pressure, respiratory therapy assess nares and determine need for appliance change or resting period.   3/2/2023 1556 by Abbi Giang, RN  Outcome: Progressing     Problem: ABCDS Injury Assessment  Goal: Absence of physical injury  Outcome: Progressing

## 2023-03-02 NOTE — ED PROVIDER NOTES
[address] ENCOUNTER      Pt Name: Kaushal Ortiz  MRN: [id number]  [place] 1960  Date of evaluation: 3/1/23      CHIEF COMPLAINT       Chief Complaint   Patient presents with    Emesis    Other     HISTORY OF PRESENT ILLNESS   HPI 60 y.o. female presents with c/o nausea and vomiting. Pt reports that she had been feeling nauseated throughout the day and had 1 episode of vomiting earlier in the day. The patient has multiple chronic medical conditions including CKD and her providers felt she needed to be admitted to the hospital and so they sent her to the emergency department. She admits to feeling weak and fatigued. She denies shortness of breath or chest pain. She denies headache. She reports that her nausea and vomiting are improving. She has not been urinating well. Carmen Hawk REVIEW OF SYSTEMS       Review of Systems   Constitutional:  Positive for fatigue. Negative for fever. HENT:  Negative for congestion. Respiratory:  Negative for cough and shortness of breath. Cardiovascular:  Negative for chest pain. Gastrointestinal:  Positive for nausea and vomiting. Negative for abdominal pain. Genitourinary:  Positive for decreased urine volume and difficulty urinating. Skin:  Negative for rash. Neurological:  Negative for headaches.      PAST MEDICAL HISTORY     Past Medical History:   Diagnosis Date    CAD (coronary artery disease)     CHF (congestive heart failure) ([facility])     CKD (chronic kidney disease) stage 3, GFR 30-59 ml/min ([facility])     Diabetes mellitus ([facility])     Hyperkalemia     Hypertension     Microscopic hematuria     Proteinuria     Renal failure     Status post coronary artery bypass grafting 2010    Type II or unspecified type diabetes mellitus without mention of complication, not stated as uncontrolled     UTI (urinary tract infection)        SURGICAL HISTORY       Past Surgical History:   Procedure Laterality Date    CARDIAC SURGERY      CATARACT EXTRACTION EXTRACAPSULAR W/ INTRAOCULAR LENS IMPLANTATION Right 09/01/2022    [facility]/Complex with VisionBlue and iris stretching    CATARACT REMOVAL Right 9/1/2022    EYE CATARACT EMULSIFICATION IOL IMPLANT w/ VISION BLUE AND PERIBULBAR BLOCK performed by Melani Munoz MD at [address] [facility]       Current Discharge Medication List        CONTINUE these medications which have NOT CHANGED    Details   omeprazole (PRILOSEC) 40 MG delayed release capsule TAKE 1 CAPSULE BY MOUTH ONCE DAILY FOR 90 DAYS      BISACODYL 5 MG EC tablet USE AS DIRECTED      magnesium oxide (MAG-OX) 400 (241.3 Mg) MG TABS tablet TAKE 1 TABLET BY MOUTH ONCE DAILY WITH FOOD FOR 90 DAYS      Sennosides-Docusate Sodium 8.6-50 MG CAPS Take 2 tablets by mouth daily      BASAGLAR KWIKPEN 100 UNIT/ML injection pen Inject 42 Units into the skin daily      sodium bicarbonate 650 MG tablet Take 650 mg by mouth daily      ramipril (ALTACE) 2.5 MG capsule Take 1 capsule by mouth daily  Qty: 30 capsule, Refills: 3      carvedilol (COREG) 6.25 MG tablet Take 1 tablet by mouth 2 times daily  Qty: 60 tablet, Refills: 3      pravastatin (PRAVACHOL) 20 MG tablet Take 20 mg by mouth nightly      aspirin 81 MG EC tablet Take 162 mg by mouth daily       potassium chloride (KLOR-CON M) 20 MEQ extended release tablet Take 2 tablets by mouth daily for 4 doses Stop Lokelma - take potassium Chloride 40mEq today and 40mEq tomorrow recheck potassium on Monday morning  Qty: 4 tablet, Refills: 0    Associated Diagnoses: Hyperkalemia      ferrous sulfate (IRON 325) 325 (65 Fe) MG tablet Take by mouth daily      vitamin D3 (CHOLECALCIFEROL) 125 MCG (5000 UT) TABS tablet 1,000 Units daily      TRUE METRIX BLOOD GLUCOSE TEST strip USE AS DIRECTED ONCE DAILY      TRUEplus Lancets 30G MISC USE TWICE DAILY      Blood Pressure Monitor KIT Take Bp daily  Qty: 1 kit, Refills: 0 ALLERGIES     is allergic to ace inhibitors. FAMILY HISTORY     She indicated that her mother is . She indicated that her father is . SOCIAL HISTORY      reports that she has never smoked. She has never used smokeless tobacco. She reports that she does not drink alcohol and does not use drugs. PHYSICAL EXAM     INITIAL VITALS: /64   Pulse 77   Temp 97.4 °F (36.3 °C) (Oral)   Resp 18   Ht 5' (1.524 m)   Wt 126 lb (57.2 kg)   SpO2 93%   BMI 24.61 kg/m²   Gen: nad  Head: Normocephalic, atraumatic  Eye: Pupils equal round reactive to light, no conjunctivitis  ENT: Dry oral mucosa  Neck: no JVD  Heart: Regular rate and rhythm no murmurs  Lungs: Clear to auscultation bilaterally, no respiratory distress  Abdomen: Soft, nontender, nondistended, with no peritoneal signs  Neurologic: Patient is alert and oriented x3, motor and sensation is intact in all 4 extremities, fluent speech  Extremities: no edema      MEDICAL DECISION MAKIN yo F presenting with nausea and vomiting. Multiple chronic comorbid medical conditions including CKD5 not on dialysis, CAD, CHF, DM, HTN presenting with nausea and vomiting. Ddx: pancreatitis, heaptitis, atypical acs, renal failure, uremia. Conditions considered but not supported by her history or physicial exam include bowel obstruction, intestinal ischemia. History obtained through  services. IV placed, laboratory studies and ekg ordered. Pt found to be hyponatremic, most likely a hypovolemic hyponatremia. Her kidney function is actually improved from 4 days ago, but she is still uremic. She is hypokalemic. Mild troponin elevation, likely secondary to her kidney disease. I discussed the case with her cardiologist Dr. Blue Phelan. I also discussed the case with nephrology Dr. Laura Caballero, and he asks that the patient be put on a low dose of IVF overnight at 75ml/hr.   Finally I discussed the case with the internal medicine service NP who wrote admission orders.  Pt reassessed.  Discussed treatment plan and she is in agreement with staying in the hospital for treatment of her renal failure and acute hyponatremia.     EKG: All EKG's are interpreted by the Emergency Department Physician who either signs or Co-signs this chart in the absence of a cardiologist.  EKG shows a sinus rhythm.  HR is 85, , , , no REIN, No STD, TWI, the axis is normal.        DATA FOR LAB AND RADIOLOGY TESTS ORDERED BELOW ARE REVIEWED BY THE ED CLINICIAN:    RADIOLOGY: All x-rays, CT, MRI, and formal ultrasound images (except ED bedside ultrasound) are read by the radiologist, see reports below, unless otherwise noted in MDM or here.  Reports below are reviewed by myself.  US RETROPERITONEAL COMPLETE    (Results Pending)       LABS: Lab orders shown below, the results are reviewed by myself, and all abnormals are listed below.  Labs Reviewed   CBC WITH AUTO DIFFERENTIAL - Abnormal; Notable for the following components:       Result Value    MCV 79.4 (*)     Monocytes 9 (*)     All other components within normal limits   COMPREHENSIVE METABOLIC PANEL - Abnormal; Notable for the following components:    Glucose 128 (*)     BUN 35 (*)     Creatinine 3.50 (*)     Est, Glom Filt Rate 14 (*)     Calcium 7.8 (*)     Sodium 123 (*)     Potassium 2.8 (*)     Chloride 89 (*)     Alkaline Phosphatase 160 (*)     AST 45 (*)     Albumin 3.1 (*)     All other components within normal limits   LIPASE - Abnormal; Notable for the following components:    Lipase 80 (*)     All other components within normal limits   URINALYSIS WITH REFLEX TO CULTURE - Abnormal; Notable for the following components:    Urine Hgb SMALL (*)     Protein, UA 3+ (*)     Leukocyte Esterase, Urine TRACE (*)     All other components within normal limits   TROPONIN - Abnormal; Notable for the following components:    Troponin, High Sensitivity 41 (*)     All other components within normal limits  TROPONIN - Abnormal; Notable for the following components:    Troponin, High Sensitivity 46 (*)     All other components within normal limits   OSMOLALITY - Abnormal; Notable for the following components:    Serum Osmolality 268 (*)     All other components within normal limits   MAGNESIUM - Abnormal; Notable for the following components:    Magnesium 1.5 (*)     All other components within normal limits   COMPREHENSIVE METABOLIC PANEL W/ REFLEX TO MG FOR LOW K - Abnormal; Notable for the following components:    Glucose 173 (*)     BUN 35 (*)     Creatinine 3.49 (*)     Est, Glom Filt Rate 14 (*)     Calcium 8.0 (*)     Sodium 121 (*)     Potassium 2.8 (*)     Chloride 87 (*)     CO2 18 (*)     Alkaline Phosphatase 149 (*)     AST 40 (*)     Albumin 3.0 (*)     All other components within normal limits   SODIUM - Abnormal; Notable for the following components:    Sodium 121 (*)     All other components within normal limits   TROPONIN - Abnormal; Notable for the following components:    Troponin, High Sensitivity 50 (*)     All other components within normal limits   SODIUM - Abnormal; Notable for the following components:    Sodium 120 (*)     All other components within normal limits   URIC ACID - Abnormal; Notable for the following components:    Uric Acid 7.4 (*)     All other components within normal limits   POC GLUCOSE FINGERSTICK - Abnormal; Notable for the following components:    POC Glucose 117 (*)     All other components within normal limits   POC GLUCOSE FINGERSTICK - Abnormal; Notable for the following components:    POC Glucose 161 (*)     All other components within normal limits   POC GLUCOSE FINGERSTICK - Abnormal; Notable for the following components:    POC Glucose 152 (*)     All other components within normal limits   POC GLUCOSE FINGERSTICK - Abnormal; Notable for the following components:    POC Glucose 132 (*)     All other components within normal limits   CULTURE, URINE   LACTIC ACID SODIUM, URINE, RANDOM   OSMOLALITY, URINE   MAGNESIUM   MICROSCOPIC URINALYSIS   LACTATE DEHYDROGENASE   SODIUM   KAPPA/LAMBDA QUANTITATIVE FREE LIGHT CHAINS, SERUM   C3 COMPLEMENT   C4 COMPLEMENT   PROTEIN / CREATININE RATIO, URINE   HEPATITIS C ANTIBODY   HEPATITIS B SURFACE ANTIGEN   HEPATITIS B CORE ANTIBODY, IGM   GLOMERULAR BASEMENT MEMBRANE (GBM) ANTIBODY IGG   ANTI-NEUTROPHIL ANTIBODY   MELLISSA SCREEN WITH REFLEX   PROTEIN, URINE, TIMED   COMPREHENSIVE METABOLIC PANEL W/ REFLEX TO MG FOR LOW K   CBC WITH AUTO DIFFERENTIAL   POCT GLUCOSE   POCT GLUCOSE   POCT GLUCOSE   POCT GLUCOSE   POCT GLUCOSE       Vitals Reviewed:    Vitals:    03/01/23 0959 03/01/23 1005 03/01/23 1200 03/01/23 1812   BP: 119/61 125/65 109/60 116/64   Pulse:  91 81 77   Resp:   18 18   Temp:   97.4 °F (36.3 °C)    TempSrc:   Oral    SpO2:   92% 93%   Weight:       Height:         MEDICATIONS GIVEN TO PATIENT THIS ENCOUNTER:  Orders Placed This Encounter   Medications    NIFEdipine (ADALAT CC) extended release tablet 30 mg    DISCONTD: 0.9 % sodium chloride infusion    potassium chloride (KLOR-CON M) extended release tablet 10 mEq    sodium chloride flush 0.9 % injection 5-40 mL    sodium chloride flush 0.9 % injection 5-40 mL    0.9 % sodium chloride infusion    heparin (porcine) injection 5,000 Units    DISCONTD: ondansetron (ZOFRAN-ODT) disintegrating tablet 4 mg    DISCONTD: ondansetron (ZOFRAN) injection 4 mg    OR Linked Order Group     acetaminophen (TYLENOL) tablet 650 mg     acetaminophen (TYLENOL) suppository 650 mg    DISCONTD: NIFEdipine (ADALAT CC) extended release tablet 30 mg    aspirin EC tablet 162 mg    insulin glargine (LANTUS) injection vial 42 Units    glucose chewable tablet 16 g    OR Linked Order Group     dextrose bolus 10% 125 mL     dextrose bolus 10% 250 mL    glucagon (rDNA) injection 1 mg    dextrose 10 % infusion    insulin lispro (HUMALOG) injection vial 0-8 Units    insulin lispro (HUMALOG) injection vial 0-4 Units    bisacodyl (DULCOLAX) EC tablet 5 mg    ferrous sulfate (IRON 325) tablet 325 mg    magnesium oxide (MAG-OX) tablet 400 mg    pantoprazole (PROTONIX) tablet 40 mg    pravastatin (PRAVACHOL) tablet 20 mg    DISCONTD: ramipril (ALTACE) capsule 2.5 mg    sennosides-docusate sodium (SENOKOT-S) 8.6-50 MG tablet 2 tablet    DISCONTD: sodium bicarbonate tablet 650 mg    DISCONTD: hydrALAZINE (APRESOLINE) injection 20 mg    magnesium sulfate 1000 mg in dextrose 5% 100 mL IVPB    prochlorperazine (COMPAZINE) injection 10 mg    hydrALAZINE (APRESOLINE) injection 10 mg    potassium chloride 10 mEq/100 mL IVPB (Peripheral Line)    carvedilol (COREG) tablet 6.25 mg    magnesium sulfate 2000 mg in water 50 mL IVPB    potassium chloride (KLOR-CON M) extended release tablet 40 mEq    sodium bicarbonate 75 mEq in sodium chloride 0.45 % 1,000 mL infusion    sodium bicarbonate tablet 650 mg     DISCHARGE PRESCRIPTIONS:  Current Discharge Medication List        PHYSICIAN CONSULTS ORDERED THIS ENCOUNTER:  IP CONSULT TO NEPHROLOGY  IP CONSULT TO CARDIOLOGY     FINAL IMPRESSION      1. Hyponatremia    2. Hypokalemia    3. Uremia    4. Stage 5 chronic kidney disease not on chronic dialysis [facility])          DISPOSITION/PLAN   DISPOSITION Admitted 03/01/2023 01:19:21 AM      PATIENT REFERRED TO:  No follow-up provider specified.     DISCHARGE MEDICATIONS:  Current Discharge Medication List            Erik Mak MD  Attending Emergency Physician                      Erik Mak MD  03/01/23 2014 ambulatory

## 2023-03-02 NOTE — PLAN OF CARE
Problem: Skin/Tissue Integrity  Goal: Absence of new skin breakdown  Description: 1. Monitor for areas of redness and/or skin breakdown  2. Assess vascular access sites hourly  3. Every 4-6 hours minimum:  Change oxygen saturation probe site  4. Every 4-6 hours:  If on nasal continuous positive airway pressure, respiratory therapy assess nares and determine need for appliance change or resting period. 3/2/2023 0350 by Jose Luis Hernandez RN  Outcome: Progressing  Note: Skin assessment complete. Turned and repositioned every two hours per self. Area kept free from moisture. Proper nourishment and fluids encouraged, as appropriate. Will continue to monitor for additional needs and changes in skin breakdown. Problem: Safety - Adult  Goal: Free from fall injury  3/2/2023 0350 by Jose Luis Hernandez RN  Outcome: Progressing  Note: Pt assessed as a fall risk this shift. Remains free from falls and accidental injury at this time. Fall precautions in place, including falling star sign and fall risk band on pt. Floor free from obstacles, and bed is locked and in lowest position. Adequate lighting provided. Pt encouraged to call before getting OOB for any need. Bed alarm activated. Will continue to monitor needs during hourly rounding, and reinforce education on use of call light. Problem: Pain  Goal: Verbalizes/displays adequate comfort level or baseline comfort level  3/2/2023 0350 by Jose Luis Hernandez RN  Outcome: Adequate for Discharge  Note: No pain at this time. 0/10 pain scale.

## 2023-03-02 NOTE — CARE COORDINATION
ONGOING DISCHARGE PLAN:    Patient is alert and oriented x4.  at bedside. RN with patient at this time. Per Notes Dr Zev Rosales saw the patient and recommended to start HD. Orders for IR to placed tunneled HD catheter today. Will have LSW follow for outpt HD slot. New consult for urology , ontiveros placed this am.     , K+ 3.4 BUN 41 Creat 4.44 HGB 11.0 HCT 32.4     Will continue to follow for additional discharge needs.     Electronically signed by Key Lazaro RN on 3/2/2023 at 9:22 AM

## 2023-03-02 NOTE — FLOWSHEET NOTE
03/01/23 2020   Treatment Team Notification   Reason for Communication Review case   Team Member Name Dana    Treatment Team Role Advanced Practice Nurse   Method of Communication Secure Message   Response See orders   Notification Time 2124     RN updated Dana Wheeler of pt's stating she has pressure in bladder and feels like she has to pee but has not peed since this AM. RN ok'd to bladder scan pt. See MAR/orders.

## 2023-03-02 NOTE — FLOWSHEET NOTE
03/01/23 2220   Treatment Team Notification   Reason for Communication Review case   Team Member Name Meka Gardiner   Treatment Team Role Advanced Practice Nurse   Method of Communication Secure Message   Response See orders   Notification Time [id number]     RN updated  Meka Gardiner that pt got up to toilet and was still unable to urinate and bladder scan showed 875mL. RN ok' to straight cath pt 1 time. See MAR/orders.

## 2023-03-02 NOTE — PROGRESS NOTES
Dr. Marvin Roberts notified nurse didn't give patient her morning lantus as blood sugar was in 60s this morning and family is also concerned her face is swollen which started last night.   To come assess

## 2023-03-02 NOTE — PROGRESS NOTES
[facility]  Ayad Breaux MD; Kevin Fernando MD; Apple Chew MD; MD Donna Miranda MD; Velta Bernheim, MD  [facility]   [address]                 Date:   3/2/2023  Patientname:  Ty Bolton  Date of admission:  2/28/2023 11:22 PM  MRN:   [id number]  Account:  [de-identified]  YOB: 1960  PCP:    Rita Lucio MD  Room:   2081/2081-01  Code Status:    Full Code      Chief Complaint:     Chief Complaint   Patient presents with    Emesis    Other       History of Present Illness:     Arlene Bolton is a 60 y.o. Non- / non  female who presents with Emesis and is admitted to the hospital for the management of Acute kidney injury superimposed on chronic kidney disease ([facility] [address] Extensive medical history of CAD, CHF, CKD 4, DM and HTN. Patient and  only speaks Urdu, communicated with video . Patient is being followed closely by nephrology as outpatient due to stage IV CKD. She presented with nausea and vomiting. She presented with nausea and vomiting. Initial lab work shows CR 3.5 which is actually improved from CR 4.17 on 12/24/2023. Electrolyte imbalances noted with sodium 123, potassium 2.8, magnesium 1.5. Placement initiated in ED. Patient is also hypertensive, primary cardiologist Dr. Shonda Fernandes consulted and requested starting nifedipine. Troponin slightly elevated at 41 & 46. Patient denies chest pain or shortness of breath. Denies fever, chills, chest pain, cough, diarrhea, and urinary symptoms. There are no aggravating or alleviating factors. Symptoms are reported as acute, constant and moderate in severity.     Communication was done with the help of video  [id number]  Nausea is better  Vitals are stable  3/1  Patient feels that her face is swollen,  Felt like the face was puffy this morning, was hypoglycemic, vitals were stable. As per HPI rest of review of system negative. Physical exam  General : Patient alert awake in no acute distress  HEENT : Atraumatic, normocephalic , oral mucosa membrane moist,  Eyes : Extraocular movements intact  Neck : Supple, range of motion intact,  Cardiovascular : Regular rate and rhythm, no murmur appreciated  Respiratory : Bilateral clear breath sounds, no wheezing crackles appreciated  Gastrointestinal  : Abdomen soft, nontender, bowel sounds present  Extremities : No pedal edema clubbing or cyanosis present  Skin : Warm and dry, no skin lesions appreciated  Psych : Mood normal     Past Medical History:     Past Medical History:   Diagnosis Date    CAD (coronary artery disease)     [facility]estive [facility] failure[facility]     CKD (chronic kidney disease) stage 3, GFR 30-59 ml/min ([facility])     Diabetes mellitus ([facility])     Hyperkalemia     Hypertension     Microscopic hematuria     Proteinuria     Renal failure     Status post coronary artery bypass grafting 2010    Type II or unspecified type diabetes mellitus without mention of complication, not stated as uncontrolled     UTI (urinary tract infection)         Past Surgical History:     Past Surgical History:   Procedure Laterality Date    CARDIAC SURGERY      CATARACT EXTRACTION EXTRACAPSULAR W/ INTRAOCULAR LENS IMPLANTATION Right 09/01/2022    [facility]/Complex with VisionBlue and iris stretching    CATARACT REMOVAL Right 9/1/2022    EYE CATARACT EMULSIFICATION IOL IMPLANT w/ VISION BLUE AND PERIBULBAR BLOCK performed by Brittany Pollock MD at [address] GRAFT          Medications Prior to Admission:     Prior to Admission medications    Medication Sig Start Date End Date Taking?  Authorizing Provider   omeprazole (PRILOSEC) 40 MG delayed release capsule TAKE 1 CAPSULE BY MOUTH ONCE DAILY FOR 90 DAYS 11/27/22  Yes Historical Provider, MD   BISACODYL 5 MG EC tablet USE AS DIRECTED 12/10/21  Yes Historical Provider, MD   magnesium oxide (MAG-OX) 400 (241.3 Mg) MG TABS tablet TAKE 1 TABLET BY MOUTH ONCE DAILY WITH FOOD FOR 90 DAYS 10/26/20  Yes Historical Provider, MD   Sennosides-Docusate Sodium 8.6-50 MG CAPS Take 2 tablets by mouth daily   Yes Historical Provider, MD Jaret Pompa 100 UNIT/ML injection pen Inject 42 Units into the skin daily 5/16/19  Yes Historical Provider, MD   sodium bicarbonate 650 MG tablet Take 650 mg by mouth daily 5/16/19  Yes Historical Provider, MD   ramipril (ALTACE) 2.5 MG capsule Take 1 capsule by mouth daily  Patient taking differently: Take 2.5 mg by mouth daily Hold 3/28/19  Yes Daron Dobbs MD   carvedilol (COREG) 6.25 MG tablet Take 1 tablet by mouth 2 times daily  Patient taking differently: Take 3.125 mg by mouth 2 times daily 3/27/19  Yes Daron Dobbs MD   pravastatin (PRAVACHOL) 20 MG tablet Take 20 mg by mouth nightly   Yes Historical Provider, MD   aspirin 81 MG EC tablet Take 162 mg by mouth daily    Yes Historical Provider, MD   potassium chloride (KLOR-CON M) 20 MEQ extended release tablet Take 2 tablets by mouth daily for 4 doses Stop Lokelma - take potassium Chloride 40mEq today and 40mEq tomorrow recheck potassium on Monday morning 2/24/23 2/28/23  Madai Schwab MD   ferrous sulfate (IRON 325) 325 (65 Fe) MG tablet Take by mouth daily    Historical Provider, MD   vitamin D3 (CHOLECALCIFEROL) 125 MCG (5000 UT) TABS tablet 1,000 Units daily    Historical Provider, MD   TRUE METRIX BLOOD GLUCOSE TEST strip USE AS DIRECTED ONCE DAILY 10/26/20   Historical Provider, MD   TRUEplus Lancets 30G MISC USE TWICE DAILY 10/26/20   Historical Provider, MD   Blood Pressure Monitor KIT Take Bp daily 11/23/20   Madai Schwab MD        Allergies:     Ace inhibitors    Social History:     Tobacco:    reports that she has never smoked.  She has never used smokeless tobacco.  Alcohol:      reports no history of alcohol use.  Drug Use:  reports no history of drug use.     Family History:     Family History   Problem Relation Age of Onset    Heart Attack Mother     Diabetes Mother     Diabetes Father        Investigations:      Laboratory Testing:  Recent Results (from the past 25 hour(s))   POC Glucose Fingerstick    Collection Time: 03/01/23 11:19 AM   Result Value Ref Range    POC Glucose 152 (H) 65 - 105 mg/dL   Sodium    Collection Time: 03/01/23 12:03 PM   Result Value Ref Range    Sodium 121 (L) 135 - 144 mmol/L   Kappa/Lambda Quantitative Free Light Chains, Serum    Collection Time: 03/01/23  3:39 PM   Result Value Ref Range    Kappa Free Light Chains QNT 15.03 (H) 0.37 - 1.94 mg/dL    Lambda Free Light Chains QNT 12.24 (H) 0.57 - 2.63 mg/dL    Free Kappa/Lambda Ratio 1.23 0.26 - 1.65   C3 COMPLEMENT    Collection Time: 03/01/23  3:39 PM   Result Value Ref Range    Complement C3 110 90 - 180 mg/dL   C4 COMPLEMENT    Collection Time: 03/01/23  3:39 PM   Result Value Ref Range    Complement C4 28 10 - 40 mg/dL   Hepatitis C Antibody    Collection Time: 03/01/23  3:39 PM   Result Value Ref Range    Hepatitis C Ab REACTIVE (A) NONREACTIVE   Hepatitis B Surface Antigen    Collection Time: 03/01/23  3:39 PM   Result Value Ref Range    Hepatitis B Surface Ag NONREACTIVE NONREACTIVE   Hepatitis B Core Antibody, IgM    Collection Time: 03/01/23  3:39 PM   Result Value Ref Range    Hep B Core Ab, IgM NONREACTIVE NONREACTIVE   Uric Acid    Collection Time: 03/01/23  3:39 PM   Result Value Ref Range    Uric Acid 7.4 (H) 2.4 - 5.7 mg/dL   Lactate Dehydrogenase    Collection Time: 03/01/23  3:39 PM   Result Value Ref Range     135 - 214 U/L   POC Glucose Fingerstick    Collection Time: 03/01/23  4:10 PM   Result Value Ref Range    POC Glucose 132 (H) 65 - 105 mg/dL   Sodium    Collection Time: 03/01/23  5:53 PM   Result Value Ref Range    Sodium 120 (L) 135 - 144 mmol/L   POC Glucose Fingerstick    Collection Time: 03/01/23  8:38 PM   Result Value Ref Range    POC Glucose 120 (H) 65 - 105 mg/dL   Sodium    Collection Time: 03/01/23 11:49 PM   Result Value Ref Range    Sodium 121 (L) 135 - 144 mmol/L   Protein / Creatinine Ratio, Urine    Collection Time: 03/02/23 12:11 AM   Result Value Ref Range    Total Protein, Urine 384 mg/dL    Creatinine, Ur 57.1 28.0 - 217.0 mg/dL    Urine Total Protein Creatinine Ratio 6.73 (H) 0.00 - 0.20   Comprehensive Metabolic Panel w/ Reflex to MG    Collection Time: 03/02/23  5:45 AM   Result Value Ref Range    Glucose 76 70 - 99 mg/dL    BUN 41 (H) 8 - 23 mg/dL    Creatinine 4.44 (H) 0.50 - 0.90 mg/dL    Est, Glom Filt Rate 11 (L) >60 mL/min/1.73m2    Calcium 7.8 (L) 8.6 - 10.4 mg/dL    Sodium 123 (L) 135 - 144 mmol/L    Potassium 3.4 (L) 3.7 - 5.3 mmol/L    Chloride 92 (L) 98 - 107 mmol/L    CO2 20 20 - 31 mmol/L    Anion Gap 11 9 - 17 mmol/L    Alkaline Phosphatase 127 (H) 35 - 104 U/L    ALT 25 5 - 33 U/L    AST 50 (H) <32 U/L    Total Bilirubin 0.3 0.3 - 1.2 mg/dL    Total Protein 5.8 (L) 6.4 - 8.3 g/dL    Albumin 2.6 (L) 3.5 - 5.2 g/dL   CBC with Auto Differential    Collection Time: 03/02/23  5:45 AM   Result Value Ref Range    WBC 6.4 3.5 - 11.0 k/uL    RBC 4.10 4.0 - 5.2 m/uL    Hemoglobin 11.0 (L) 12.0 - 16.0 g/dL    Hematocrit 32.4 (L) 36 - 46 %    MCV 78.9 (L) 80 - 100 fL    MCH 26.7 26 - 34 pg    MCHC 33.9 31 - 37 g/dL    RDW 15.2 (H) 11.5 - 14.9 %    Platelets 495 898 - 203 k/uL    MPV 7.5 6.0 - 12.0 fL    Seg Neutrophils 48 36 - 66 %    Lymphocytes 38 24 - 44 %    Monocytes 10 (H) 1 - 7 %    Eosinophils % 4 0 - 4 %    Basophils 0 0 - 2 %    Segs Absolute 3.10 1.3 - 9.1 k/uL    Absolute Lymph # 2.40 1.0 - 4.8 k/uL    Absolute Mono # 0.60 0.1 - 1.3 k/uL    Absolute Eos # 0.30 0.0 - 0.4 k/uL    Basophils Absolute 0.00 0.0 - 0.2 k/uL   Magnesium    Collection Time: 03/02/23  5:45 AM   Result Value Ref Range    Magnesium 2.2 1.6 - 2.6 mg/dL   POC Glucose Fingerstick    Collection Time: 03/02/23  6:13 AM   Result Value Ref Range    POC Glucose 66 65 - 105 mg/dL   POC Glucose Fingerstick    Collection Time: 03/02/23  7:03 AM   Result Value Ref Range    POC Glucose 96 65 - 105 mg/dL       Imaging/Diagnostics:  No results found. Plan:     Patient status inpatient in the Progressive Unit/Step down    OREN superimposed on CKD4  -Creatinine cr 3.5; Baseline 2.0  -history of CKD  -Nephrology consulted in ED  -NS infusion  -hold diuretics/nephrotoxic medications  -monitor BMP    Hypokalemia  -K+ replaced with 1x dose  -recheck BMP in am    Hypomagnesemia  -Mag level - 1.5  - 1 gm IV replacement ordered  -monitor magnesium level daily    Hyponatremia  -Sodium level -123  -check sodium level every 4 hours for now  -IV NS at 75ml/hr per nephrology    HTN  -cardiology consulted in ED  -started on Nifedipine, hydralazine prn  -Continue home BP meds  -Monitor VS    Nausea/vomiting  -compazine prn  -consider abdominal imaging if unresolved    Nausea vomiting is better now, will give 40 p.o. potassium as well, potassium was 2.8 this morning,  If no improvement in the nausea and vomiting, by today evening will do imaging,  Sodium worse at 121, will inform nephrology,  Seen by cardiology no intervention at this point  Creatinine 3.49.      3/\2  Patient was found to be retaining 800 mL urine last night, ultrasound kidneys pending, discussed with Dr. Nataliia Platt recommended to put order for IR tunneled catheter for dialysis  Continues to be hyponatremic with sodium of 123  Patient was hypoglycemic this morning with blood sugar dropped to 50, getting 42 units of Lantus at home, will decrease it to 20 units, potassium replaced, urine culture is negative. AST was high, patient hepatitis C positive, get ultrasound liver, patient to follow-up with GI as outpatient. Maria Dolores Springer MD  3/2/2023  8:27 AM      Please note that this chart was generated using voice recognition Dragon dictation software.   Although every effort was made to ensure the accuracy of this automated transcription, some errors in transcription may have occurred. [address] [place] [address].    Phone [phone]

## 2023-03-02 NOTE — PROGRESS NOTES
Department of Internal Medicine  Nephrology David Gupta MD Progress Note    Reason for consultation: Management of acute kidney injury and chronic kidney disease stage IV. Consulting physician: Kimberlee Syed MD.    Interval history: Patient was seen and examined today and she feels bloated from IV fluid. She has a post void bladder residual of 800 mL last night and recheck this morning was over 700 mL. Renal ultrasound is pending. She has poor appetite. Encounter was in the presence of RN as well as with the assistance of a video . History of present illness: This is a 60 y.o. 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 I saw patient in the office last week Friday 2/24/23 and recommended dialysis but she refused at this time as she was planning to travel to [facility] to visit family in middle of March and she will be gone for 5 months. She presented to the emergency department last night with 2 episodes of nausea and vomiting associated with uncontrolled hypertension. Vital signs at presentation were remarkable for blood pressure 218/89 mmHg. Studies at presentation [2/28/2023] was remarkable for BUNs/creatinine 35/3.50 mg/dL; Sodium 123 mmol/L and potassium 2.8 mmol/L. Today, blood pressure control is better and patient has nausea but no vomiting. Appetite is decreased. She denies shortness of breath.     Scheduled Meds:   [START ON 3/3/2023] insulin glargine  20 Units SubCUTAneous Daily    sodium chloride flush  5-40 mL IntraVENous 2 times per day    heparin (porcine)  5,000 Units SubCUTAneous 3 times per day    aspirin  162 mg Oral Daily    insulin lispro  0-8 Units SubCUTAneous TID WC    insulin lispro  0-4 Units SubCUTAneous Nightly    ferrous sulfate  325 mg Oral Daily with breakfast    magnesium oxide  400 mg Oral Daily    pantoprazole  40 mg Oral QAM AC    pravastatin  20 mg Oral Nightly    carvedilol  6.25 mg Oral BID WC    sodium bicarbonate  650 mg Oral TID     Continuous Infusions:   sodium chloride      dextrose      IV infusion builder 25 mL/hr at 23 0850     PRN Meds:.perflutren lipid microspheres, sodium chloride flush, sodium chloride, acetaminophen **OR** acetaminophen, glucose, dextrose bolus **OR** dextrose bolus, glucagon (rDNA), dextrose, bisacodyl, sennosides-docusate sodium, prochlorperazine, hydrALAZINE    Physical Exam:    VITALS:  /62   Pulse 70   Temp 97.1 °F (36.2 °C) (Oral)   Resp 16   Ht 5' (1.524 m)   Wt 133 lb 13.1 oz (60.7 kg)   SpO2 92%   BMI 26.13 kg/m²   TEMPERATURE:  Current - Temp: 97.1 °F (36.2 °C); Max - Temp  Av.7 °F (36.5 °C)  Min: 97.1 °F (36.2 °C)  Max: 98.3 °F (36.8 °C)  RESPIRATIONS RANGE: Resp  Av.7  Min: 16  Max: 18  PULSE RANGE: Pulse  Av  Min: 70  Max: 78  BLOOD PRESSURE RANGE:  Systolic (58FKG), XOI:276 , Min:116 , WEM:909 ; Diastolic (87ATV), EUQ:35, Min:62, Max:64  PULSE OXIMETRY RANGE: SpO2  Av.3 %  Min: 92 %  Max: 93 %  24HR INTAKE/OUTPUT:    Intake/Output Summary (Last 24 hours) at 3/2/2023 1223  Last data filed at 3/2/2023 5250  Gross per 24 hour   Intake 2603 ml   Output 480 ml   Net 2123 ml       Constitutional: alert, appears stated age, and cooperative    Skin: Skin color, texture, turgor normal. No rashes or lesions    Head: Normocephalic, without obvious abnormality, atraumatic     Cardiovascular/Edema: regular rate and rhythm, S1, S2 normal, no murmur, click, rub or gallop    Respiratory: Lungs: clear to auscultation bilaterally    Abdomen: soft, non-tender; bowel sounds normal; no masses,  no organomegaly    Back: symmetric, no curvature.  ROM normal. No CVA tenderness. Extremities: edema +    Neuro:  Grossly normal    CBC:   Recent Labs     02/28/23  2345 03/02/23  0545   WBC 6.2 6.4   HGB 12.6 11.0*    299       BMP:    Recent Labs     02/28/23  2345 03/01/23  0633 03/01/23  1203 03/01/23  2349 03/02/23  0545 03/02/23  1120   * 121*   < > 121* 123* 122*   K 2.8* 2.8*  --   --  3.4*  --    CL 89* 87*  --   --  92*  --    CO2 20 18*  --   --  20  --    BUN 35* 35*  --   --  41*  --    CREATININE 3.50* 3.49*  --   --  4.44*  --    GLUCOSE 128* 173*  --   --  76  --     < > = values in this interval not displayed. Lab Results   Component Value Date/Time    NITRU NEGATIVE 03/02/2023 09:19 AM    COLORU Yellow 03/02/2023 09:19 AM    PHUR 5.0 03/02/2023 09:19 AM    WBCUA 0 TO 2 03/02/2023 09:19 AM    RBCUA 0 TO 2 03/02/2023 09:19 AM    MUCUS 1+ 03/27/2019 08:01 AM    TRICHOMONAS NOT REPORTED 03/27/2019 08:01 AM    YEAST NOT REPORTED 03/27/2019 08:01 AM    BACTERIA None 03/02/2023 09:19 AM    CLARITYU Clear 12/01/2020 12:00 AM    SPECGRAV 1.008 03/02/2023 09:19 AM    LEUKOCYTESUR NEGATIVE 03/02/2023 09:19 AM    UROBILINOGEN Normal 03/02/2023 09:19 AM    BILIRUBINUR NEGATIVE 03/02/2023 09:19 AM    BILIRUBINUR NEGATIVE 05/09/2012 11:18 AM    BLOODU Negative 12/01/2020 12:00 AM    GLUCOSEU NEGATIVE 03/02/2023 09:19 AM    GLUCOSEU 1+ 05/09/2012 11:18 AM    KETUA NEGATIVE 03/02/2023 09:19 AM    AMORPHOUS NOT REPORTED 03/27/2019 08:01 AM     Urine Sodium:     Lab Results   Component Value Date/Time    CALIXTO 53 03/01/2023 01:28 AM     Urine Chloride:    Lab Results   Component Value Date/Time    CLUR 23 02/24/2023 12:32 PM     Urine Osmolarity:   Lab Results   Component Value Date/Time    OSMOU 190 03/01/2023 01:28 AM     Urine Creatinine:     Lab Results   Component Value Date/Time    LABCREA 57.1 03/02/2023 12:11 AM     IMPRESSION/RECOMMENDATIONS:      1.   Acute kidney injury with chronic kidney disease stage IV - most consistent with Obstructive uropathy from urinary retention with top differential being progression of chronic kidney disease to stage V. Renal ultrasound today showed increase cortical renal echogenicity with no hydronephrosis. She is at increasing risk for uremic complications and I have recommended dialysis and patient is now agreeable. Plan: Discontinue IV fluid due to worsening edema. Place indwelling Solis catheter. Consult IR to place tunneled IJ hemodialysis catheter. Hemodialysis with ultrafiltration today for 2 hours. Monitor urine output closely. Basic metabolic profile daily. 2.  Hyponatremia - hypervolemic and hypo-osmolar as suggested by serum osmolality 268 mOsm/kg. This suggest fluid retention consequent to advanced chronic kidney disease and treatment will be dialysis. Fluid restriction 1.5 L/day    3. Systemic hypertension - BP control is adequate. 4.  Anion gap metabolic acidosis - secondary to uremia. Continue sodium bicarbonate 650 mg p.o. 3 times daily. 5.  Hypokalemia - secondary to poor intake. We will dialyze patient against a high potassium bath. Prognosis is guarded.     Estella Gore MD FACP  Attending Nephrologist  3/2/2023 12:23 PM

## 2023-03-02 NOTE — FLOWSHEET NOTE
03/01/23 2347   Treatment Team Notification   Reason for Communication Review case   Team Member Name Laury Homans   Treatment Team Role Advanced Practice Nurse   Method of Communication Secure Message   Response No new orders   Notification Time [phone]     RN updated Laury Homans that pt's straight cath yeilded 480mL before the cath tip fell out. RN to keep Khushboo Homans notified if pt has any more sensation of bladder pressure and anuria. No new orders.

## 2023-03-02 NOTE — PROGRESS NOTES
S: spoke through . No major complaints overnight. No pain. She did have urinary retention. For whatever reason, all Solis catheter was not placed at that time. Nursing reports 800 cc post void.     O:  /62   Pulse 70   Temp 97.1 °F (36.2 °C) (Oral)   Resp 16   Ht 5' (1.524 m)   Wt 133 lb 13.1 oz (60.7 kg)   SpO2 92%   BMI 26.13 kg/m²   RRR  CTAB  No edema  Alert    Impression:   OREN/CKD  Hypertensive urgency, resolved  CAD s/p CABG*3     after she suffered an extensive anterior MI in 2020  Chronic recurrent hyperkalemia for notes  Normal LVEF 2019 SPECT  ?low normal LVEF, last TTE 2017      Repeat TTE at least to help guide medical therapy and antihypertensive as it has been multiple years since any assessment and had prior MI    Hydralazine/nitrates +beta-blocker if any degree LVSD and would not use nifedipine as CCB only amlodipine      Plans for HD line and possible HD initiation today

## 2023-03-02 NOTE — CARE COORDINATION
SW utilized the  services to speak with patient about outpatient dialysis. Patient asked several times if she can come to the hospital and receive dialysis. SW informed patient that [address] [place] [address] does not offer outpatient hemodialysis services. Patient and  then asked about Patient informed SW that her and her  live in a hotel. The [facility] in [address] [place] [address].  states that they have transportation and are not worried about the distance. They are unfamiliar with the area, and no not know where locations are. Initially, SW attempted to find dialysis closer to [facility], which was [place]. SW was quickly informed by bedside nurse that Dr. Milan Cardoso would like for the patient to go to [facility]. SW requested nurse to get chest xray ordered. SW is waiting on HEP B panel results as well and packet will be faxed to [facility] admissions. Electronically signed by MIKEY Echeverria on 3/2/2023 at 12:10 PM     Waiting on labs and chest xray to be resulted. Will continue to follow for these documents to submit to [facility].      Electronically signed by MIKEY Echeverria on 3/2/2023 at 3:51 PM

## 2023-03-03 ENCOUNTER — APPOINTMENT (OUTPATIENT)
Dept: INTERVENTIONAL RADIOLOGY/VASCULAR | Age: 63
DRG: 469 | End: 2023-03-03
Payer: COMMERCIAL

## 2023-03-03 ENCOUNTER — APPOINTMENT (OUTPATIENT)
Dept: GENERAL RADIOLOGY | Age: 63
DRG: 469 | End: 2023-03-03
Payer: COMMERCIAL

## 2023-03-03 LAB
ABSOLUTE EOS #: 0.4 K/UL (ref 0–0.4)
ABSOLUTE LYMPH #: 2.3 K/UL (ref 1–4.8)
ABSOLUTE MONO #: 0.8 K/UL (ref 0.1–1.3)
ALBUMIN SERPL-MCNC: 2.5 G/DL (ref 3.5–5.2)
ALP SERPL-CCNC: 132 U/L (ref 35–104)
ALT SERPL-CCNC: 28 U/L (ref 5–33)
ANA SER QL IA: ABNORMAL
ANION GAP SERPL CALCULATED.3IONS-SCNC: 10 MMOL/L (ref 9–17)
AST SERPL-CCNC: 46 U/L
BASOPHILS # BLD: 0 % (ref 0–2)
BASOPHILS ABSOLUTE: 0 K/UL (ref 0–0.2)
BILIRUB SERPL-MCNC: 0.3 MG/DL (ref 0.3–1.2)
BUN SERPL-MCNC: 47 MG/DL (ref 8–23)
CALCIUM SERPL-MCNC: 8.1 MG/DL (ref 8.6–10.4)
CHLORIDE SERPL-SCNC: 97 MMOL/L (ref 98–107)
CO2 SERPL-SCNC: 23 MMOL/L (ref 20–31)
CREAT SERPL-MCNC: 4.85 MG/DL (ref 0.5–0.9)
DSDNA IGG SER QL IA: 3 IU/ML
EOSINOPHILS RELATIVE PERCENT: 6 % (ref 0–4)
GBM ANTIBODY IGG: <2 AU/ML (ref 0–7)
GFR SERPL CREATININE-BSD FRML MDRD: 10 ML/MIN/1.73M2
GLUCOSE BLD-MCNC: 123 MG/DL (ref 65–105)
GLUCOSE BLD-MCNC: 135 MG/DL (ref 65–105)
GLUCOSE BLD-MCNC: 139 MG/DL (ref 65–105)
GLUCOSE BLD-MCNC: 167 MG/DL (ref 65–105)
GLUCOSE BLD-MCNC: 248 MG/DL (ref 65–105)
GLUCOSE SERPL-MCNC: 137 MG/DL (ref 70–99)
HCT VFR BLD AUTO: 33.1 % (ref 36–46)
HGB BLD-MCNC: 11.1 G/DL (ref 12–16)
HOURS COLLECTED: 24 H
LYMPHOCYTES # BLD: 38 % (ref 24–44)
MAGNESIUM SERPL-MCNC: 2.1 MG/DL (ref 1.6–2.6)
MCH RBC QN AUTO: 26.9 PG (ref 26–34)
MCHC RBC AUTO-ENTMCNC: 33.4 G/DL (ref 31–37)
MCV RBC AUTO: 80.5 FL (ref 80–100)
MONOCYTES # BLD: 13 % (ref 1–7)
NUCLEAR IGG SER IA-RTO: 0.7 U/ML
PDW BLD-RTO: 15.1 % (ref 11.5–14.9)
PLATELET # BLD AUTO: 297 K/UL (ref 150–450)
PMV BLD AUTO: 8.3 FL (ref 6–12)
POTASSIUM SERPL-SCNC: 4.1 MMOL/L (ref 3.7–5.3)
PROT SERPL-MCNC: 5.6 G/DL (ref 6.4–8.3)
PROTEIN 24 HOUR URINE: 3716 MG/24 H
RBC # BLD: 4.11 M/UL (ref 4–5.2)
SEG NEUTROPHILS: 43 % (ref 36–66)
SEGMENTED NEUTROPHILS ABSOLUTE COUNT: 2.6 K/UL (ref 1.3–9.1)
SODIUM SERPL-SCNC: 127 MMOL/L (ref 135–144)
SODIUM SERPL-SCNC: 130 MMOL/L (ref 135–144)
SODIUM SERPL-SCNC: 134 MMOL/L (ref 135–144)
URINE TOTAL PROTEIN: 167 MG/DL
VOLUME: 2225 ML
WBC # BLD AUTO: 6.1 K/UL (ref 3.5–11)

## 2023-03-03 PROCEDURE — 6360000002 HC RX W HCPCS: Performed by: NURSE PRACTITIONER

## 2023-03-03 PROCEDURE — 2500000003 HC RX 250 WO HCPCS: Performed by: INTERNAL MEDICINE

## 2023-03-03 PROCEDURE — 82947 ASSAY GLUCOSE BLOOD QUANT: CPT

## 2023-03-03 PROCEDURE — 87522 HEPATITIS C REVRS TRNSCRPJ: CPT

## 2023-03-03 PROCEDURE — 36558 INSERT TUNNELED CV CATH: CPT

## 2023-03-03 PROCEDURE — 80053 COMPREHEN METABOLIC PANEL: CPT

## 2023-03-03 PROCEDURE — 90935 HEMODIALYSIS ONE EVALUATION: CPT

## 2023-03-03 PROCEDURE — 84295 ASSAY OF SERUM SODIUM: CPT

## 2023-03-03 PROCEDURE — 36415 COLL VENOUS BLD VENIPUNCTURE: CPT

## 2023-03-03 PROCEDURE — 84156 ASSAY OF PROTEIN URINE: CPT

## 2023-03-03 PROCEDURE — 6370000000 HC RX 637 (ALT 250 FOR IP): Performed by: NURSE PRACTITIONER

## 2023-03-03 PROCEDURE — 99232 SBSQ HOSP IP/OBS MODERATE 35: CPT | Performed by: INTERNAL MEDICINE

## 2023-03-03 PROCEDURE — 2580000003 HC RX 258: Performed by: RADIOLOGY

## 2023-03-03 PROCEDURE — 85025 COMPLETE CBC W/AUTO DIFF WBC: CPT

## 2023-03-03 PROCEDURE — 3209999900 FLUORO FOR SURGICAL PROCEDURES

## 2023-03-03 PROCEDURE — 6360000002 HC RX W HCPCS: Performed by: RADIOLOGY

## 2023-03-03 PROCEDURE — 81050 URINALYSIS VOLUME MEASURE: CPT

## 2023-03-03 PROCEDURE — 83735 ASSAY OF MAGNESIUM: CPT

## 2023-03-03 PROCEDURE — 76937 US GUIDE VASCULAR ACCESS: CPT

## 2023-03-03 PROCEDURE — 86317 IMMUNOASSAY INFECTIOUS AGENT: CPT

## 2023-03-03 PROCEDURE — 2580000003 HC RX 258: Performed by: NURSE PRACTITIONER

## 2023-03-03 PROCEDURE — 5A1D70Z PERFORMANCE OF URINARY FILTRATION, INTERMITTENT, LESS THAN 6 HOURS PER DAY: ICD-10-PCS | Performed by: INTERNAL MEDICINE

## 2023-03-03 PROCEDURE — 77001 FLUOROGUIDE FOR VEIN DEVICE: CPT

## 2023-03-03 PROCEDURE — 6370000000 HC RX 637 (ALT 250 FOR IP): Performed by: INTERNAL MEDICINE

## 2023-03-03 PROCEDURE — C1894 INTRO/SHEATH, NON-LASER: HCPCS

## 2023-03-03 PROCEDURE — 2060000000 HC ICU INTERMEDIATE R&B

## 2023-03-03 RX ADMIN — CARVEDILOL 6.25 MG: 6.25 TABLET, FILM COATED ORAL at 08:01

## 2023-03-03 RX ADMIN — SODIUM BICARBONATE 650 MG: 650 TABLET ORAL at 20:39

## 2023-03-03 RX ADMIN — FERROUS SULFATE TAB 325 MG (65 MG ELEMENTAL FE) 325 MG: 325 (65 FE) TAB at 08:01

## 2023-03-03 RX ADMIN — PRAVASTATIN SODIUM 20 MG: 20 TABLET ORAL at 20:39

## 2023-03-03 RX ADMIN — Medication 1.6 ML: at 14:35

## 2023-03-03 RX ADMIN — HYDRALAZINE HYDROCHLORIDE 10 MG: 20 INJECTION INTRAMUSCULAR; INTRAVENOUS at 06:34

## 2023-03-03 RX ADMIN — SODIUM CHLORIDE, PRESERVATIVE FREE 10 ML: 5 INJECTION INTRAVENOUS at 20:40

## 2023-03-03 RX ADMIN — SODIUM CHLORIDE, PRESERVATIVE FREE 10 ML: 5 INJECTION INTRAVENOUS at 08:02

## 2023-03-03 RX ADMIN — HEPARIN SODIUM 5000 UNITS: 5000 INJECTION INTRAVENOUS; SUBCUTANEOUS at 15:22

## 2023-03-03 RX ADMIN — MAGNESIUM OXIDE TAB 400 MG (241.3 MG ELEMENTAL MG) 400 MG: 400 (241.3 MG) TAB at 08:02

## 2023-03-03 RX ADMIN — SODIUM BICARBONATE 650 MG: 650 TABLET ORAL at 15:22

## 2023-03-03 RX ADMIN — HYDRALAZINE HYDROCHLORIDE 10 MG: 20 INJECTION INTRAMUSCULAR; INTRAVENOUS at 18:34

## 2023-03-03 RX ADMIN — CARVEDILOL 6.25 MG: 6.25 TABLET, FILM COATED ORAL at 15:22

## 2023-03-03 RX ADMIN — CEFAZOLIN 1000 MG: 1 INJECTION, POWDER, FOR SOLUTION INTRAMUSCULAR; INTRAVENOUS at 10:23

## 2023-03-03 RX ADMIN — SODIUM BICARBONATE 650 MG: 650 TABLET ORAL at 08:01

## 2023-03-03 RX ADMIN — HEPARIN SODIUM 5000 UNITS: 5000 INJECTION INTRAVENOUS; SUBCUTANEOUS at 20:40

## 2023-03-03 ASSESSMENT — PAIN SCALES - GENERAL
PAINLEVEL_OUTOF10: 0

## 2023-03-03 NOTE — CONSULTS
Urology Consultation    Patient:  Gerardo Pal  MRN: [id number]  YOB: 1960      CHIEF COMPLAINT:  urinary retention    HISTORY OF PRESENT ILLNESS:   The patient is a 60 y.o. female who presents with emesis. She cannot speak [place]. History obtained through . She had a ontiveros placed for 1L. She had some constipation at home. She has not seen a urologist in the past.   She is tolerating the ontiveros. She has CKD and is having a tunneled cath placed today. Patient's old records, notes and chart reviewed and summarized above.     Past Medical History:    Past Medical History:   Diagnosis Date    CAD (coronary artery disease)     CHF (congestive heart failure) ([facility])     CKD (chronic kidney disease) stage 3, GFR 30-59 ml/min ([facility])     Diabetes mellitus ([facility])     Hyperkalemia     Hypertension     Microscopic hematuria     Proteinuria     Renal failure     Status post coronary artery bypass grafting 2010    Type II or unspecified type diabetes mellitus without mention of complication, not stated as uncontrolled     UTI (urinary tract infection)        Past Surgical History:    Past Surgical History:   Procedure Laterality Date    CARDIAC SURGERY      CATARACT EXTRACTION EXTRACAPSULAR W/ INTRAOCULAR LENS IMPLANTATION Right 09/01/2022    [facility]/Complex with VisionBlue and iris stretching    CATARACT REMOVAL Right 9/1/2022    EYE CATARACT EMULSIFICATION IOL IMPLANT w/ VISION BLUE AND PERIBULBAR BLOCK performed by Lalita Hernández MD at [place] [id number] [facility]         Medications:    Scheduled Meds:   ceFAZolin  1,000 mg IntraVENous Once    insulin glargine  20 Units SubCUTAneous Daily    sodium chloride flush  5-40 mL IntraVENous 2 times per day    heparin (porcine)  5,000 Units SubCUTAneous 3 times per day    aspirin  162 mg Oral Daily    insulin lispro  0-8 Units SubCUTAneous TID     insulin lispro  0-4 Units SubCUTAneous Nightly    ferrous sulfate  325 mg Oral Daily with breakfast    magnesium oxide  400 mg Oral Daily    pantoprazole  40 mg Oral QAM AC    pravastatin  20 mg Oral Nightly    carvedilol  6.25 mg Oral BID WC    sodium bicarbonate  650 mg Oral TID     Continuous Infusions:   sodium chloride      dextrose       PRN Meds:.sodium chloride flush, sodium chloride, acetaminophen **OR** acetaminophen, glucose, dextrose bolus **OR** dextrose bolus, glucagon (rDNA), dextrose, bisacodyl, sennosides-docusate sodium, prochlorperazine, hydrALAZINE    Allergies:  Ace inhibitors    Social History:    Social History     Socioeconomic History    Marital status:      Spouse name: Not on file    Number of children: Not on file    Years of education: Not on file    Highest education level: Not on file   Occupational History    Not on file   Tobacco Use    Smoking status: Never    Smokeless tobacco: Never   Vaping Use    Vaping Use: Never used   Substance and Sexual Activity    Alcohol use: No    Drug use: No    Sexual activity: Yes     Partners: Male   Other Topics Concern    Not on file   Social History Narrative    ** Merged History Encounter **          Social Determinants of Health     Financial Resource Strain: Not on file   Food Insecurity: Not on file   Transportation Needs: Not on file   Physical Activity: Not on file   Stress: Not on file   Social Connections: Not on file   Intimate Partner Violence: Not on file   Housing Stability: Not on file       Family History:    Family History   Problem Relation Age of Onset    Heart Attack Mother     Diabetes Mother     Diabetes Father        Physical Exam:      This a 60 y.o. female   Patient Vitals for the past 24 hrs:   BP Temp Temp src Pulse Resp SpO2   03/03/23 0707 -- -- -- 77 -- --   03/03/23 0645 (!) 131/56 98 °F (36.7 °C) Oral 77 18 98 %   03/03/23 0615 (!) 182/83 98.1 °F (36.7 °C) Oral 80 18 96 %   03/02/23 2315 (!) 150/61 98.3 °F (36.8 °C) Oral 73 18 94 %   03/02/23 1845 (!) 156/75 98 °F (36.7 °C) Oral 79 18 93 %   03/02/23 1615 (!) 151/75 -- -- 86 -- --   03/02/23 1302 (!) 155/70 98.8 °F (37.1 °C) Oral 75 18 94 %     Constitutional: Patient in no acute distress. Neuro: Alert and oriented to person, place and time. Psych: mood and affect normal  Abdomen: Soft, non-tender, non-distended with no CVA, flank pain. LABS:   Recent Labs     02/28/23  2345 03/02/23  0545 03/03/23  0527   WBC 6.2 6.4 6.1   HGB 12.6 11.0* 11.1*   HCT 37.4 32.4* 33.1*   MCV 79.4* 78.9* 80.5    299 297     Recent Labs     03/01/23  0633 03/01/23  1203 03/02/23  0545 03/02/23  1120 03/02/23  1735 03/02/23  2359 03/03/23  0527   *   < > 123*   < > 127* 127* 130*   K 2.8*  --  3.4*  --   --   --  4.1   CL 87*  --  92*  --   --   --  97*   CO2 18*  --  20  --   --   --  23   BUN 35*  --  41*  --   --   --  47*   CREATININE 3.49*  --  4.44*  --   --   --  4.85*    < > = values in this interval not displayed. Additional Lab/culture results:    Urinalysis:   Recent Labs     03/02/23  0919   COLORU Yellow   PHUR 5.0   WBCUA 0 TO 2   RBCUA 0 TO 2   BACTERIA None   SPECGRAV 1.008   LEUKOCYTESUR NEGATIVE   UROBILINOGEN Normal   BILIRUBINUR NEGATIVE        -----------------------------------------------------------------  Imaging Results: renal ultrasound images reviewed. No hydro.        Assessment and Plan   Impression:    Patient Active Problem List   Diagnosis    Atypical chest pain    DM (diabetes mellitus) ([facility]    CKD (chronic kidney disease) stage 4, GFR 15-29 ml/min ([facility])    Angina pectoris ([facility]    CAD, S/P CABGx3 2010    Hypertension    Dyslipidemia    Hyperkalemia    Proteinuria    CHF (congestive heart failure) ([facility])    Hyperglycemia    Abnormal EKG    Arteriosclerosis of arterial coronary artery bypass graft    Disorder resulting from impaired renal tubular function, unspecified    Hyperlipidemia    Ischemic cardiomyopathy    Rash and other nonspecific skin eruption    Shortness of breath    Acute kidney injury superimposed on chronic kidney disease [facility])       Plan:     60year-old female with retention. Would remove ontiveros once bowel movements have normalized. Check PVRs on removal.   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      Primo Gallagher MD  8:25 AM 3/3/2023

## 2023-03-03 NOTE — PROGRESS NOTES
Department of Internal Medicine  Nephrology Dinh Meeks MD Progress Note    Reason for consultation: Management of acute kidney injury and chronic kidney disease stage IV. Consulting physician: Magaly Pena MD.  Encounter was in the presence of RN as well as with the assistance of a video . Interval history: Patient was seen and examined today at dialysis. She had a right IJ tunneled catheter placed today and receiving her first treatment she has no complaints except diminished appetite she denies shortness of breath. Patient has a Solis catheter in place due to postvoid residual of 800 cc 2 days ago. Renal ultrasound showed no hydronephrosis increased echogenicity of the bilateral renal cortex. History of present illness: This is a 60 y.o. 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 I saw patient in the office last week Friday 2/24/23 and recommended dialysis but she refused at this time as she was planning to travel to [facility] to visit family in middle of March and she will be gone for 5 months. She presented to the emergency department last night with 2 episodes of nausea and vomiting associated with uncontrolled hypertension. Vital signs at presentation were remarkable for blood pressure 218/89 mmHg. Studies at presentation [2/28/2023] was remarkable for BUNs/creatinine 35/3.50 mg/dL; Sodium 123 mmol/L and potassium 2.8 mmol/L. Today, blood pressure control is better and patient has nausea but no vomiting. Appetite is decreased. She denies shortness of breath.     Scheduled Meds:   insulin glargine  20 Units SubCUTAneous Daily    sodium chloride flush 5-40 mL IntraVENous 2 times per day    heparin (porcine)  5,000 Units SubCUTAneous 3 times per day    aspirin  162 mg Oral Daily    insulin lispro  0-8 Units SubCUTAneous TID WC    insulin lispro  0-4 Units SubCUTAneous Nightly    ferrous sulfate  325 mg Oral Daily with breakfast    pantoprazole  40 mg Oral QAM AC    pravastatin  20 mg Oral Nightly    carvedilol  6.25 mg Oral BID WC    sodium bicarbonate  650 mg Oral TID     Continuous Infusions:   sodium chloride      dextrose       PRN Meds:.sodium chloride flush, sodium chloride, acetaminophen **OR** acetaminophen, glucose, dextrose bolus **OR** dextrose bolus, glucagon (rDNA), dextrose, bisacodyl, sennosides-docusate sodium, prochlorperazine, hydrALAZINE    Physical Exam:    VITALS:  BP (!) 170/70   Pulse 80   Temp 98.2 °F (36.8 °C)   Resp 16   Ht 5' (1.524 m)   Wt 133 lb 13.1 oz (60.7 kg)   SpO2 98%   BMI 26.13 kg/m²   TEMPERATURE:  Current - Temp: 98.2 °F (36.8 °C); Max - Temp  Av.2 °F (36.8 °C)  Min: 98 °F (36.7 °C)  Max: 98.8 °F (37.1 °C)  RESPIRATIONS RANGE: Resp  Av  Min: 13  Max: 18  PULSE RANGE: Pulse  Av.6  Min: 73  Max: 86  BLOOD PRESSURE RANGE:  Systolic (94KTQ), BKH:684 , Min:131 , EKW:733 ; Diastolic (18WXP), RASHAD:94, Min:56, Max:83  PULSE OXIMETRY RANGE: SpO2  Av.2 %  Min: 93 %  Max: 98 %  24HR INTAKE/OUTPUT:    Intake/Output Summary (Last 24 hours) at 3/3/2023 1253  Last data filed at 3/3/2023 4361  Gross per 24 hour   Intake 1050 ml   Output 1250 ml   Net -200 ml       Constitutional: alert, appears stated age, and cooperative    Skin: Skin color, texture, turgor normal. No rashes or lesions    Head: Normocephalic, without obvious abnormality, atraumatic     Cardiovascular/Edema: regular rate and rhythm, S1, S2 normal, no murmur, click, rub or gallop    Respiratory: Lungs: clear to auscultation bilaterally    Abdomen: soft, non-tender; bowel sounds normal; no masses,  no organomegaly    Back: symmetric, no curvature.  ROM normal. No CVA tenderness. Extremities: edema +    Neuro:  Grossly normal    CBC:   Recent Labs     02/28/23  2345 03/02/23  0545 03/03/23 0527   WBC 6.2 6.4 6.1   HGB 12.6 11.0* 11.1*    299 297       BMP:    Recent Labs     03/01/23  0633 03/01/23  1203 03/02/23  0545 03/02/23  1120 03/02/23  1735 03/02/23  2359 03/03/23  0527   *   < > 123*   < > 127* 127* 130*   K 2.8*  --  3.4*  --   --   --  4.1   CL 87*  --  92*  --   --   --  97*   CO2 18*  --  20  --   --   --  23   BUN 35*  --  41*  --   --   --  47*   CREATININE 3.49*  --  4.44*  --   --   --  4.85*   GLUCOSE 173*  --  76  --   --   --  137*    < > = values in this interval not displayed. Lab Results   Component Value Date/Time    NITRU NEGATIVE 03/02/2023 09:19 AM    COLORU Yellow 03/02/2023 09:19 AM    PHUR 5.0 03/02/2023 09:19 AM    WBCUA 0 TO 2 03/02/2023 09:19 AM    RBCUA 0 TO 2 03/02/2023 09:19 AM    MUCUS 1+ 03/27/2019 08:01 AM    TRICHOMONAS NOT REPORTED 03/27/2019 08:01 AM    YEAST NOT REPORTED 03/27/2019 08:01 AM    BACTERIA None 03/02/2023 09:19 AM    CLARITYU Clear 12/01/2020 12:00 AM    SPECGRAV 1.008 03/02/2023 09:19 AM    LEUKOCYTESUR NEGATIVE 03/02/2023 09:19 AM    UROBILINOGEN Normal 03/02/2023 09:19 AM    BILIRUBINUR NEGATIVE 03/02/2023 09:19 AM    BILIRUBINUR NEGATIVE 05/09/2012 11:18 AM    BLOODU Negative 12/01/2020 12:00 AM    GLUCOSEU NEGATIVE 03/02/2023 09:19 AM    GLUCOSEU 1+ 05/09/2012 11:18 AM    KETUA NEGATIVE 03/02/2023 09:19 AM    AMORPHOUS NOT REPORTED 03/27/2019 08:01 AM     Urine Sodium:     Lab Results   Component Value Date/Time    CALIXTO 53 03/01/2023 01:28 AM     Urine Chloride:    Lab Results   Component Value Date/Time    CLUR 23 02/24/2023 12:32 PM     Urine Osmolarity:   Lab Results   Component Value Date/Time    OSMOU 190 03/01/2023 01:28 AM     Urine Creatinine:     Lab Results   Component Value Date/Time    LABCREA 57.1 03/02/2023 12:11 AM     IMPRESSION/RECOMMENDATIONS:      1.   Acute kidney injury with chronic kidney disease stage IV - most consistent with Obstructive uropathy from urinary retention with top differential being progression of chronic kidney disease to stage V. Renal ultrasound today showed increase cortical renal echogenicity with no hydronephrosis. She is at increasing risk for uremic complications and I have recommended dialysis and patient is now agreeable. S/P Right IJ tunneled catheter placement 3/3/2023. Plan:   Continue indwelling Solis catheter. First Hemodialysis with ultrafiltration today for 2 hours. Monitor urine output closely. Basic metabolic profile daily. 2.  Hyponatremia - hypervolemic and hypo-osmolar as suggested by serum osmolality 268 mOsm/kg. This suggest fluid retention consequent to advanced chronic kidney disease and treatment will be dialysis. Fluid restriction 1.5 L/day    3. Systemic hypertension - BP control is adequate. 4.  Anion gap metabolic acidosis - secondary to uremia. Continue sodium bicarbonate 650 mg p.o. 3 times daily. 5.  Hypokalemia - secondary to poor intake. We will dialyze patient against a high potassium bath. 6.  Hypomagnesemia -we will hold magnesium oxide due to ESRD-Check magnesium level    Prognosis is guarded.     Therese Webster M.D, [facility]  Nephrologist

## 2023-03-03 NOTE — PROGRESS NOTES
[facility] Internal Medicine  Stacy Mojica MD; Koffi Cain MD; Adin Loyd MD; MD Daniela Ling MD; Carlos Kaufman MD  [facility]   [address] [facility] [place]                 Date:   3/3/2023  Patientname:  Ana Laura Briones  Date of admission:  2/28/2023 11:22 PM  MRN:   [id number]  Account:  [de-identified]  YOB: 1960  PCP:    Lynne Obrien MD  Room:   2081/2081-01  Code Status:    Full Code      Chief Complaint:     Chief Complaint   Patient presents with    Emesis    Other       History of Present Illness:     Arlene Briones is a 60 y.o. Non- / non  female who presents with Emesis and is admitted to the hospital for the management of Acute kidney injury superimposed on chronic kidney disease ([facility] [address] Extensive medical history of CAD, CHF, CKD 4, DM and HTN. Patient and  only speaks Albanian, communicated with video . Patient is being followed closely by nephrology as outpatient due to stage IV CKD. She presented with nausea and vomiting. She presented with nausea and vomiting. Initial lab work shows CR 3.5 which is actually improved from CR 4.17 on 12/24/2023. Electrolyte imbalances noted with sodium 123, potassium 2.8, magnesium 1.5. Placement initiated in ED. Patient is also hypertensive, primary cardiologist Dr. Dayday Guadarrama consulted and requested starting nifedipine. Troponin slightly elevated at 41 & 46. Patient denies chest pain or shortness of breath. Denies fever, chills, chest pain, cough, diarrhea, and urinary symptoms. There are no aggravating or alleviating factors. Symptoms are reported as acute, constant and moderate in severity.     Communication was done with the help of video  [id number]  Nausea is better  Vitals are stable  3/1  Patient feels that her face is swollen,  Felt like the face was puffy this morning, was hypoglycemic, vitals were stable. As per HPI rest of review of system negative. Physical exam  General : Patient alert awake in no acute distress  HEENT : Atraumatic, normocephalic , oral mucosa membrane moist,  Eyes : Extraocular movements intact  Neck : Supple, range of motion intact,  Cardiovascular : Regular rate and rhythm, no murmur appreciated  Respiratory : Bilateral clear breath sounds, no wheezing crackles appreciated  Gastrointestinal  : Abdomen soft, nontender, bowel sounds present  Extremities : No pedal edema clubbing or cyanosis present  Skin : Warm and dry, no skin lesions appreciated  Psych : Mood normal     Past Medical History:     Past Medical History:   Diagnosis Date    CAD (coronary artery disease)     CHF (congestive heart failure) ([facility])     CKD (chronic kidney disease) stage 3, GFR 30-59 ml/min ([facility])     Diabetes mellitus ([facility])     Hyperkalemia     Hypertension     Microscopic hematuria     Proteinuria     Renal failure     Status post coronary artery bypass grafting 2010    Type II or unspecified type diabetes mellitus without mention of complication, not stated as uncontrolled     UTI (urinary tract infection)         Past Surgical History:     Past Surgical History:   Procedure Laterality Date    CARDIAC SURGERY      CATARACT EXTRACTION EXTRACAPSULAR W/ INTRAOCULAR LENS IMPLANTATION Right 09/01/2022    [facility]/Complex with VisionBlue and iris stretching    CATARACT REMOVAL Right 9/1/2022    EYE CATARACT EMULSIFICATION IOL IMPLANT w/ VISION BLUE AND PERIBULBAR BLOCK performed by Brittany Pollock MD at [place] [address] [facility]RAFT          Medications Prior to Admission:     Prior to Admission medications    Medication Sig Start Date End Date Taking?  Authorizing Provider   omeprazole (PRILOSEC) 40 MG delayed release capsule TAKE 1 CAPSULE BY MOUTH ONCE DAILY FOR 90 DAYS 11/27/22  Yes Historical Provider, MD   BISACODYL 5 MG EC tablet USE AS DIRECTED 12/10/21  Yes Historical Provider, MD   magnesium oxide (MAG-OX) 400 (241.3 Mg) MG TABS tablet TAKE 1 TABLET BY MOUTH ONCE DAILY WITH FOOD FOR 90 DAYS 10/26/20  Yes Historical Provider, MD   Sennosides-Docusate Sodium 8.6-50 MG CAPS Take 2 tablets by mouth daily   Yes Historical Provider, MD Berry Quita KWIKPEN 100 UNIT/ML injection pen Inject 42 Units into the skin daily 5/16/19  Yes Historical Provider, MD   sodium bicarbonate 650 MG tablet Take 650 mg by mouth daily 5/16/19  Yes Historical Provider, MD   ramipril (ALTACE) 2.5 MG capsule Take 1 capsule by mouth daily  Patient taking differently: Take 2.5 mg by mouth daily Hold 3/28/19  Yes Adrienne Salas MD   carvedilol (COREG) 6.25 MG tablet Take 1 tablet by mouth 2 times daily  Patient taking differently: Take 3.125 mg by mouth 2 times daily 3/27/19  Yes Adrienne Salas MD   pravastatin (PRAVACHOL) 20 MG tablet Take 20 mg by mouth nightly   Yes Historical Provider, MD   aspirin 81 MG EC tablet Take 162 mg by mouth daily    Yes Historical Provider, MD   potassium chloride (KLOR-CON M) 20 MEQ extended release tablet Take 2 tablets by mouth daily for 4 doses Stop Lokelma - take potassium Chloride 40mEq today and 40mEq tomorrow recheck potassium on Monday morning 2/24/23 2/28/23  Edis Tineo MD   ferrous sulfate (IRON 325) 325 (65 Fe) MG tablet Take by mouth daily    Historical Provider, MD   vitamin D3 (CHOLECALCIFEROL) 125 MCG (5000 UT) TABS tablet 1,000 Units daily    Historical Provider, MD   TRUE METRIX BLOOD GLUCOSE TEST strip USE AS DIRECTED ONCE DAILY 10/26/20   Historical Provider, MD   TRUEplus Lancets 30G MISC USE TWICE DAILY 10/26/20   Historical Provider, MD   Blood Pressure Monitor KIT Take Bp daily 11/23/20   Edis Tineo MD        Allergies:     Ace inhibitors    Social History:     Tobacco:    reports that she has never smoked. She has never used smokeless tobacco.  Alcohol:      reports no history of alcohol use.   Drug Use:  reports no history of drug use.     Family History:     Family History   Problem Relation Age of Onset    Heart Attack Mother     Diabetes Mother     Diabetes Father        Investigations:      Laboratory Testing:  Recent Results (from the past 25 hour(s))   POC Glucose Fingerstick    Collection Time: 03/02/23  4:35 PM   Result Value Ref Range    POC Glucose 175 (H) 65 - 105 mg/dL   Sodium    Collection Time: 03/02/23  5:35 PM   Result Value Ref Range    Sodium 127 (L) 135 - 144 mmol/L   POC Glucose Fingerstick    Collection Time: 03/02/23  7:26 PM   Result Value Ref Range    POC Glucose 191 (H) 65 - 105 mg/dL   Sodium    Collection Time: 03/02/23 11:59 PM   Result Value Ref Range    Sodium 127 (L) 135 - 144 mmol/L   Comprehensive Metabolic Panel w/ Reflex to MG    Collection Time: 03/03/23  5:27 AM   Result Value Ref Range    Glucose 137 (H) 70 - 99 mg/dL    BUN 47 (H) 8 - 23 mg/dL    Creatinine 4.85 (H) 0.50 - 0.90 mg/dL    Est, Glom Filt Rate 10 (L) >60 mL/min/1.73m2    Calcium 8.1 (L) 8.6 - 10.4 mg/dL    Sodium 130 (L) 135 - 144 mmol/L    Potassium 4.1 3.7 - 5.3 mmol/L    Chloride 97 (L) 98 - 107 mmol/L    CO2 23 20 - 31 mmol/L    Anion Gap 10 9 - 17 mmol/L    Alkaline Phosphatase 132 (H) 35 - 104 U/L    ALT 28 5 - 33 U/L    AST 46 (H) <32 U/L    Total Bilirubin 0.3 0.3 - 1.2 mg/dL    Total Protein 5.6 (L) 6.4 - 8.3 g/dL    Albumin 2.5 (L) 3.5 - 5.2 g/dL   CBC with Auto Differential    Collection Time: 03/03/23  5:27 AM   Result Value Ref Range    WBC 6.1 3.5 - 11.0 k/uL    RBC 4.11 4.0 - 5.2 m/uL    Hemoglobin 11.1 (L) 12.0 - 16.0 g/dL    Hematocrit 33.1 (L) 36 - 46 %    MCV 80.5 80 - 100 fL    MCH 26.9 26 - 34 pg    MCHC 33.4 31 - 37 g/dL    RDW 15.1 (H) 11.5 - 14.9 %    Platelets 482 693 - 997 k/uL    MPV 8.3 6.0 - 12.0 fL    Seg Neutrophils 43 36 - 66 %    Lymphocytes 38 24 - 44 %    Monocytes 13 (H) 1 - 7 %    Eosinophils % 6 (H) 0 - 4 %    Basophils 0 0 - 2 %    Segs Absolute 2.60 1.3 - 9.1 k/uL    Absolute Lymph # 2. 30 1.0 - 4.8 k/uL    Absolute Mono # 0.80 0.1 - 1.3 k/uL    Absolute Eos # 0.40 0.0 - 0.4 k/uL    Basophils Absolute 0.00 0.0 - 0.2 k/uL   POC Glucose Fingerstick    Collection Time: 03/03/23  6:13 AM   Result Value Ref Range    POC Glucose 123 (H) 65 - 105 mg/dL   POC Glucose Fingerstick    Collection Time: 03/03/23  6:51 AM   Result Value Ref Range    POC Glucose 135 (H) 65 - 105 mg/dL   Protein, urine, timed    Collection Time: 03/03/23  8:00 AM   Result Value Ref Range    Volume 2225 mL    Hours Collected 24 h    Urine Total Protein 167 mg/dL    Protein, 24H Urine 3716 (H) <151 mg/24 h   POC Glucose Fingerstick    Collection Time: 03/03/23 11:05 AM   Result Value Ref Range    POC Glucose 167 (H) 65 - 105 mg/dL       Imaging/Diagnostics:  No results found.       Plan:     Patient status inpatient in the Progressive Unit/Step down    OREN superimposed on CKD4  -Creatinine cr 3.5; Baseline 2.0  -history of CKD  -Nephrology consulted in ED  -NS infusion  -hold diuretics/nephrotoxic medications  -monitor BMP    Hypokalemia  -K+ replaced with 1x dose  -recheck BMP in am    Hypomagnesemia  -Mag level - 1.5  - 1 gm IV replacement ordered  -monitor magnesium level daily    Hyponatremia  -Sodium level -123  -check sodium level every 4 hours for now  -IV NS at 75ml/hr per nephrology    HTN  -cardiology consulted in ED  -started on Nifedipine, hydralazine prn  -Continue home BP meds  -Monitor VS    Nausea/vomiting  -compazine prn  -consider abdominal imaging if unresolved    Nausea vomiting is better now, will give 40 p.o. potassium as well, potassium was 2.8 this morning,  If no improvement in the nausea and vomiting, by today evening will do imaging,  Sodium worse at 121, will inform nephrology,  Seen by cardiology no intervention at this point  Creatinine 3.49.      3/\2  Patient was found to be retaining 800 mL urine last night, ultrasound kidneys pending, discussed with Dr. Laura Caballero recommended to put order for IR tunneled catheter for dialysis  Continues to be hyponatremic with sodium of 123  Patient was hypoglycemic this morning with blood sugar dropped to 50, getting 42 units of Lantus at home, will decrease it to 20 units, potassium replaced, urine culture is negative. AST was high, patient hepatitis C positive, get ultrasound liver, patient to follow-up with GI as outpatient. 3/3  Solis draining urine  Patient received first session of dialysis today  Sugars controlled  Outpatient dialysis slot awaited        Jacqui Martins MD  3/3/2023  2:28 PM      Please note that this chart was generated using voice recognition Dragon dictation software. Although every effort was made to ensure the accuracy of this automated transcription, some errors in transcription may have occurred. [address] [place] [address].    Phone [phone]

## 2023-03-03 NOTE — PLAN OF CARE
Problem: Discharge Planning  Goal: Discharge to home or other facility with appropriate resources  3/3/2023 1614 by Lashonda Bansal RN  Outcome: Progressing     Problem: Pain  Goal: Verbalizes/displays adequate comfort level or baseline comfort level  3/3/2023 1614 by Lashonda Bansal RN  Outcome: Progressing     Problem: Skin/Tissue Integrity  Goal: Absence of new skin breakdown  Description: 1. Monitor for areas of redness and/or skin breakdown  2. Assess vascular access sites hourly  3. Every 4-6 hours minimum:  Change oxygen saturation probe site  4. Every 4-6 hours:  If on nasal continuous positive airway pressure, respiratory therapy assess nares and determine need for appliance change or resting period.   3/3/2023 1614 by Lashonda Bansal RN  Outcome: Progressing

## 2023-03-03 NOTE — PROGRESS NOTES
Writer contacted unit to inquire about duplicate echocardiogram order. Initial echo performed yesterday, awaiting results, will be read this AM. FARIBA Muñoz stated she will contact ordering physician and see if duplicate order can be cancelled. Any questions, please call [phone].     Electronically signed by Eliceo Brenner on 3/3/2023 at 9:48 AM

## 2023-03-03 NOTE — CARE COORDINATION
Informed nurse that patient needs an HEP B Surface Antibody to send packet to [facility]. Packet can not be sent until lab is ordered and resulted.     Electronically signed by MIKEY Caceres on 3/3/2023 at 8:54 AM

## 2023-03-03 NOTE — CARE COORDINATION
HEP B Surface Antibody has not been resulted. Packet has not been faxed to [facility] dialysis.     Electronically signed by MIKEY Cantor on 3/3/2023 at 3:29 PM

## 2023-03-03 NOTE — PLAN OF CARE
Problem: Skin/Tissue Integrity  Goal: Absence of new skin breakdown  Description: 1. Monitor for areas of redness and/or skin breakdown  2. Assess vascular access sites hourly  3. Every 4-6 hours minimum:  Change oxygen saturation probe site  4. Every 4-6 hours:  If on nasal continuous positive airway pressure, respiratory therapy assess nares and determine need for appliance change or resting period.   3/3/2023 0232 by Colin Hodges RN  Outcome: Progressing  Note: Pt absent of any new skin breakdown     Problem: ABCDS Injury Assessment  Goal: Absence of physical injury  3/3/2023 0232 by Colin Hodges RN  Outcome: Progressing  Note: Pt absent of any physical injury     Problem: Safety - Adult  Goal: Free from fall injury  3/3/2023 0232 by Colin Hodges RN  Outcome: Progressing  Note: Pt free from falls

## 2023-03-03 NOTE — PROGRESS NOTES
HEMODIALYSIS PRE-TREATMENT NOTE    Patient Identifiers prior to treatment: Name, , MRN    Isolation Required: N/A                    Isolation Type: N/A     Hepatitis status:                           Date Drawn                             Result  Hepatitis B Surface Antigen 3/2/23     Negative                     Hepatitis B Surface Antibody          Hepatitis B Core Antibody 3/2/23 Negative          How was Hepatitis Status verified: EMR     Hemodialysis orders verified: Yes    Access Within normal limits: Yes    Pre-Assessment completed: Yes    Pre-dialysis report received from: [facility]                Time: 3621

## 2023-03-03 NOTE — PROGRESS NOTES
[address]  [phone]          Progress Note    Patient Name:  Ana Vasquez    :  1960  3/3/2023 8:41 AM      SUBJECTIVE       Ms. Sher Galarza  has no chest pain, shortness of breath, palpitations, nausea or vomiting this AM      OBJECTIVE     Vital signs:    BP (!) 131/56   Pulse 77   Temp 98 °F (36.7 °C) (Oral)   Resp 18   Ht 5' (1.524 m)   Wt 133 lb 13.1 oz (60.7 kg)   SpO2 98%   BMI 26.13 kg/m²     . tro    Admit Weight:  126 lb (57.2 kg)    Last 3 weights: Wt Readings from Last 3 Encounters:   23 133 lb 13.1 oz (60.7 kg)   23 126 lb (57.2 kg)   22 126 lb (57.2 kg)       BMI: Body mass index is 26.13 kg/m². Input/Output:       Intake/Output Summary (Last 24 hours) at 3/3/2023 0841  Last data filed at 3/3/2023 0539  Gross per 24 hour   Intake 1750 ml   Output 950 ml   Net 800 ml       Date 23 0000 - 23 2359   Shift 7552-0596 9936-3177 1092-7051 24 Hour Total   INTAKE   Shift Total(mL/kg)       OUTPUT   Urine(mL/kg/hr) 950(2)   950   Shift Total(mL/kg) 950(15.7)   950(15.7)   Weight (kg) 60.7 60.7 60.7 60.7     Exam:     General appearance: awake and alert moves all ext   Lungs: clear to ascultation anteriorly bilaterally  Heart: reg S1S2  Extremities: No sig edema        Laboratory Studies:     CBC:   Recent Labs     23  2345 23  0545 23  0527   WBC 6.2 6.4 6.1   HGB 12.6 11.0* 11.1*   HCT 37.4 32.4* 33.1*   MCV 79.4* 78.9* 80.5    299 297     BMP:   Recent Labs     23  0633 23  1203 23  0545 23  1120 23  1735 23  2359 23  0527   *   < > 123*   < > 127* 127* 130*   K 2.8*  --  3.4*  --   --   --  4.1   CL 87*  --  92*  --   --   --  97*   CO2 18*  --  20  --   --   --  23   BUN 35*  --  41*  --   --   --  47*   CREATININE 3.49*  --  4.44*  --   --   --  4.85*    < > = values in this interval not displayed.      PT/INR: Recent Labs     23  0931   PROTIME 13.8   INR 1.0     APTT: No results for input(s): APTT in the last 72 hours. MAG:   Recent Labs     23  2345 23  0633 23  0545   MG 1.5* 1.8 2.2     D Dimer: No results for input(s): DDIMER in the last 72 hours. Troponin    Recent Labs     23  2345 23  0122 23  0633   TROPHS 41* 46* 50*                BNP No results for input(s): BNP in the last 72 hours. No results for input(s): PROBNP in the last 72 hours. Pulse Ox: SpO2  Av %  Min: 93 %  Max: 98 %  Supplemental O2:       Current Meds:    ceFAZolin  1,000 mg IntraVENous Once    insulin glargine  20 Units SubCUTAneous Daily    sodium chloride flush  5-40 mL IntraVENous 2 times per day    heparin (porcine)  5,000 Units SubCUTAneous 3 times per day    aspirin  162 mg Oral Daily    insulin lispro  0-8 Units SubCUTAneous TID WC    insulin lispro  0-4 Units SubCUTAneous Nightly    ferrous sulfate  325 mg Oral Daily with breakfast    magnesium oxide  400 mg Oral Daily    pantoprazole  40 mg Oral QAM AC    pravastatin  20 mg Oral Nightly    carvedilol  6.25 mg Oral BID WC    sodium bicarbonate  650 mg Oral TID     Continuous Infusions:    sodium chloride      dextrose           US LIVER    Result Date: 3/2/2023  Prior cholecystectomy with otherwise unremarkable exam     XR CHEST PORTABLE    Result Date: 3/2/2023  No acute intrathoracic pathology. US RETROPERITONEAL COMPLETE    Result Date: 3/2/2023  1. Findings above consistent with chronic medical renal disease. 2. No hydronephrosis. Echo:      ASSESSMENT     Principal Problem:    Acute kidney injury superimposed on chronic kidney disease ([facility] [address]  Resolved Problems:    * No resolved hospital problems. *     OREN/CKD. Creatinine worsening. Awaiting initiation of hemodialysis. Hypertensive urgency, resolved. Blood pressure currently under reasonably good control.   CAD s/p CABG*3     after she suffered an extensive anterior MI in 2020. Patient has not followed with cardiology since. No reassessment of LV function. Awaiting echocardiogram.  To help guideline directed medical therapy as best as possible. Chronic recurrent hyperkalemia for notes      PLAN     Await echo. Initiation of guideline directed medical therapy based on LV function as kidney function and blood pressure will tolerate.       Electronically signed by Britney Leija MD on 3/3/2023 at 8:41 AM

## 2023-03-03 NOTE — PROGRESS NOTES
Patient tolerated tunneled catheter placement without distress. Dry dressing to site. Patient returned to room. Nurse updated.

## 2023-03-03 NOTE — FLOWSHEET NOTE
03/03/23 1434   Vital Signs   BP (!) 153/75   Heart Rate 76   Resp 16   Weight 133 lb 6.1 oz (60.5 kg)   Weight Method Bed scale   Percent Weight Change 0.83   Pain Assessment   Pain Assessment None - Denies Pain   Post-Hemodialysis Assessment   Post-Treatment Procedures Blood returned;Catheter capped, clamped with Citrate x 2 ports   Machine Disinfection Process Acid/Vinegar Clean;Heat Disinfect; Exterior Machine Disinfection   Rinseback Volume (ml) 250 ml   Blood Volume Processed (Liters) 35.7 l/min   Dialyzer Clearance Moderately streaked   Duration of Treatment (minutes) 120 minutes   Heparin Amount Administered During Treatment (mL) 0 mL   Hemodialysis Intake (ml) 500 ml   Hemodialysis Output (ml) 1000 ml   NET Removed (ml) 500   Tolerated Treatment Fair   Interventions Taken Ultrafiltration goal decreased   Patient Response to Treatment Patient completed OREN hemodialysis treatment #1. Patient's UF goal had to be decreased during tx due to low blood pressure, patient had a net fluid removal of 500 ml, HD CVC maintained good blood flow, HD CVC sodium citrate locked, post tx vitals stable, post tx report given to patient's primary nurse, Barrington Brice RN.    Physician Notified Yes   Time Off 2819   Patient Disposition Return to room

## 2023-03-04 ENCOUNTER — CLINICAL DOCUMENTATION (OUTPATIENT)
Dept: CARDIOTHORACIC SURGERY | Age: 63
End: 2023-03-04

## 2023-03-04 LAB
ABSOLUTE EOS #: 0 K/UL (ref 0–0.4)
ABSOLUTE LYMPH #: 2.18 K/UL (ref 1–4.8)
ABSOLUTE MONO #: 0.53 K/UL (ref 0.1–1.3)
ANION GAP SERPL CALCULATED.3IONS-SCNC: 9 MMOL/L (ref 9–17)
BASOPHILS # BLD: 1 % (ref 0–2)
BASOPHILS ABSOLUTE: 0.07 K/UL (ref 0–0.2)
BUN SERPL-MCNC: 35 MG/DL (ref 8–23)
CALCIUM SERPL-MCNC: 8.1 MG/DL (ref 8.6–10.4)
CHLORIDE SERPL-SCNC: 100 MMOL/L (ref 98–107)
CO2 SERPL-SCNC: 27 MMOL/L (ref 20–31)
CREAT SERPL-MCNC: 4.04 MG/DL (ref 0.5–0.9)
EOSINOPHILS RELATIVE PERCENT: 0 % (ref 0–4)
GFR SERPL CREATININE-BSD FRML MDRD: 12 ML/MIN/1.73M2
GLUCOSE BLD-MCNC: 139 MG/DL (ref 65–105)
GLUCOSE BLD-MCNC: 160 MG/DL (ref 65–105)
GLUCOSE BLD-MCNC: 185 MG/DL (ref 65–105)
GLUCOSE BLD-MCNC: 306 MG/DL (ref 65–105)
GLUCOSE SERPL-MCNC: 204 MG/DL (ref 70–99)
HBV SURFACE AB SERPL IA-ACNC: 4.32 MIU/ML
HBV SURFACE AB SERPL IA-ACNC: <3.5 MIU/ML
HCT VFR BLD AUTO: 32.5 % (ref 36–46)
HGB BLD-MCNC: 10.7 G/DL (ref 12–16)
LYMPHOCYTES # BLD: 33 % (ref 24–44)
MCH RBC QN AUTO: 26.8 PG (ref 26–34)
MCHC RBC AUTO-ENTMCNC: 33 G/DL (ref 31–37)
MCV RBC AUTO: 81.1 FL (ref 80–100)
MONOCYTES # BLD: 8 % (ref 1–7)
MORPHOLOGY: ABNORMAL
PDW BLD-RTO: 15.7 % (ref 11.5–14.9)
PLATELET # BLD AUTO: 275 K/UL (ref 150–450)
PMV BLD AUTO: 8.1 FL (ref 6–12)
POTASSIUM SERPL-SCNC: 3.9 MMOL/L (ref 3.7–5.3)
RBC # BLD: 4.01 M/UL (ref 4–5.2)
SEG NEUTROPHILS: 58 % (ref 36–66)
SEGMENTED NEUTROPHILS ABSOLUTE COUNT: 3.82 K/UL (ref 1.3–9.1)
SODIUM SERPL-SCNC: 134 MMOL/L (ref 135–144)
SODIUM SERPL-SCNC: 136 MMOL/L (ref 135–144)
WBC # BLD AUTO: 6.6 K/UL (ref 3.5–11)

## 2023-03-04 PROCEDURE — 85025 COMPLETE CBC W/AUTO DIFF WBC: CPT

## 2023-03-04 PROCEDURE — 86317 IMMUNOASSAY INFECTIOUS AGENT: CPT

## 2023-03-04 PROCEDURE — 6370000000 HC RX 637 (ALT 250 FOR IP): Performed by: NURSE PRACTITIONER

## 2023-03-04 PROCEDURE — 80048 BASIC METABOLIC PNL TOTAL CA: CPT

## 2023-03-04 PROCEDURE — 2500000003 HC RX 250 WO HCPCS: Performed by: INTERNAL MEDICINE

## 2023-03-04 PROCEDURE — 82947 ASSAY GLUCOSE BLOOD QUANT: CPT

## 2023-03-04 PROCEDURE — 6370000000 HC RX 637 (ALT 250 FOR IP): Performed by: INTERNAL MEDICINE

## 2023-03-04 PROCEDURE — 99232 SBSQ HOSP IP/OBS MODERATE 35: CPT | Performed by: INTERNAL MEDICINE

## 2023-03-04 PROCEDURE — 6360000002 HC RX W HCPCS: Performed by: NURSE PRACTITIONER

## 2023-03-04 PROCEDURE — 36415 COLL VENOUS BLD VENIPUNCTURE: CPT

## 2023-03-04 PROCEDURE — 2580000003 HC RX 258: Performed by: NURSE PRACTITIONER

## 2023-03-04 PROCEDURE — 2060000000 HC ICU INTERMEDIATE R&B

## 2023-03-04 PROCEDURE — 84295 ASSAY OF SERUM SODIUM: CPT

## 2023-03-04 PROCEDURE — 90935 HEMODIALYSIS ONE EVALUATION: CPT

## 2023-03-04 RX ORDER — SENNA AND DOCUSATE SODIUM 50; 8.6 MG/1; MG/1
2 TABLET, FILM COATED ORAL DAILY
Status: DISCONTINUED | OUTPATIENT
Start: 2023-03-04 | End: 2023-03-07 | Stop reason: HOSPADM

## 2023-03-04 RX ORDER — POLYETHYLENE GLYCOL 3350 17 G/17G
17 POWDER, FOR SOLUTION ORAL DAILY PRN
Status: DISCONTINUED | OUTPATIENT
Start: 2023-03-04 | End: 2023-03-06

## 2023-03-04 RX ADMIN — SENNOSIDES AND DOCUSATE SODIUM 2 TABLET: 50; 8.6 TABLET ORAL at 17:44

## 2023-03-04 RX ADMIN — PANTOPRAZOLE SODIUM 40 MG: 40 TABLET, DELAYED RELEASE ORAL at 06:25

## 2023-03-04 RX ADMIN — SODIUM CHLORIDE, PRESERVATIVE FREE 10 ML: 5 INJECTION INTRAVENOUS at 10:46

## 2023-03-04 RX ADMIN — SODIUM CHLORIDE, PRESERVATIVE FREE 10 ML: 5 INJECTION INTRAVENOUS at 20:59

## 2023-03-04 RX ADMIN — HYDRALAZINE HYDROCHLORIDE 10 MG: 20 INJECTION INTRAMUSCULAR; INTRAVENOUS at 20:59

## 2023-03-04 RX ADMIN — INSULIN GLARGINE 20 UNITS: 100 INJECTION, SOLUTION SUBCUTANEOUS at 14:21

## 2023-03-04 RX ADMIN — FERROUS SULFATE TAB 325 MG (65 MG ELEMENTAL FE) 325 MG: 325 (65 FE) TAB at 17:44

## 2023-03-04 RX ADMIN — CARVEDILOL 6.25 MG: 6.25 TABLET, FILM COATED ORAL at 17:51

## 2023-03-04 RX ADMIN — INSULIN LISPRO 6 UNITS: 100 INJECTION, SOLUTION INTRAVENOUS; SUBCUTANEOUS at 17:45

## 2023-03-04 RX ADMIN — HYDRALAZINE HYDROCHLORIDE 10 MG: 20 INJECTION INTRAMUSCULAR; INTRAVENOUS at 10:46

## 2023-03-04 RX ADMIN — POLYETHYLENE GLYCOL 3350 17 G: 17 POWDER, FOR SOLUTION ORAL at 17:45

## 2023-03-04 RX ADMIN — PRAVASTATIN SODIUM 20 MG: 20 TABLET ORAL at 20:59

## 2023-03-04 RX ADMIN — HEPARIN SODIUM 5000 UNITS: 5000 INJECTION INTRAVENOUS; SUBCUTANEOUS at 14:29

## 2023-03-04 RX ADMIN — Medication 1.6 ML: at 13:14

## 2023-03-04 RX ADMIN — HEPARIN SODIUM 5000 UNITS: 5000 INJECTION INTRAVENOUS; SUBCUTANEOUS at 20:59

## 2023-03-04 RX ADMIN — ASPIRIN 162 MG: 81 TABLET, COATED ORAL at 14:21

## 2023-03-04 RX ADMIN — SODIUM BICARBONATE 650 MG: 650 TABLET ORAL at 14:21

## 2023-03-04 RX ADMIN — HEPARIN SODIUM 5000 UNITS: 5000 INJECTION INTRAVENOUS; SUBCUTANEOUS at 06:25

## 2023-03-04 RX ADMIN — Medication 1.6 ML: at 13:13

## 2023-03-04 RX ADMIN — SODIUM BICARBONATE 650 MG: 650 TABLET ORAL at 20:59

## 2023-03-04 ASSESSMENT — ENCOUNTER SYMPTOMS: SHORTNESS OF BREATH: 0

## 2023-03-04 NOTE — PROGRESS NOTES
Completed 2.5 hours dialysis. 1Liter removed. BP high, medication given during dialysis by floor nurse. Pt. Left in stable condition with no new complaints.

## 2023-03-04 NOTE — PROGRESS NOTES
Department of Internal Medicine  Nephrology Maxime Mack MD Progress Note    Reason for consultation: Management of acute kidney injury and chronic kidney disease stage IV. Consulting physician: Swetha Romero MD.  Encounter was in the presence of RN as well as with the assistance of a video . Interval history: Patient was seen and examined today at dialysis. She had a right IJ tunneled catheter placed yesterday and received first treatment of dialysis. She is seen today on her second treatment and complains of constipation. Patient has a Solis catheter in place due to postvoid residual of 800 cc . Renal ultrasound showed no hydronephrosis increased echogenicity of the bilateral renal cortex. History of present illness: This is a 60 y.o. 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 I saw patient in the office last week Friday 2/24/23 and recommended dialysis but she refused at this time as she was planning to travel to [facility] to visit family in middle of March and she will be gone for 5 months. She presented to the emergency department last night with 2 episodes of nausea and vomiting associated with uncontrolled hypertension. Vital signs at presentation were remarkable for blood pressure 218/89 mmHg. Studies at presentation [2/28/2023] was remarkable for BUNs/creatinine 35/3.50 mg/dL; Sodium 123 mmol/L and potassium 2.8 mmol/L. Today, blood pressure control is better and patient has nausea but no vomiting. Appetite is decreased. She denies shortness of breath.     Scheduled Meds:   insulin glargine  20 Units SubCUTAneous Daily    sodium chloride flush  5-40 mL IntraVENous 2 times per day    heparin (porcine)  5,000 Units SubCUTAneous 3 times per day    aspirin  162 mg Oral Daily    insulin lispro  0-8 Units SubCUTAneous TID WC    insulin lispro  0-4 Units SubCUTAneous Nightly    ferrous sulfate  325 mg Oral Daily with breakfast    pantoprazole  40 mg Oral QAM AC    pravastatin  20 mg Oral Nightly    carvedilol  6.25 mg Oral BID WC    sodium bicarbonate  650 mg Oral TID     Continuous Infusions:   sodium chloride      dextrose       PRN Meds:.polyethylene glycol, anticoagulant sodium citrate, anticoagulant sodium citrate, sodium chloride flush, sodium chloride, acetaminophen **OR** acetaminophen, glucose, dextrose bolus **OR** dextrose bolus, glucagon (rDNA), dextrose, bisacodyl, sennosides-docusate sodium, prochlorperazine, hydrALAZINE    Physical Exam:    VITALS:  BP (!) 190/82   Pulse 82   Temp 98.6 °F (37 °C)   Resp 16   Ht 5' (1.524 m)   Wt 126 lb 12.2 oz (57.5 kg)   SpO2 93%   BMI 24.76 kg/m²   TEMPERATURE:  Current - Temp:  (machine temp 36.6c);  Max - Temp  Av.5 °F (36.9 °C)  Min: 97.8 °F (36.6 °C)  Max: 99 °F (37.2 °C)  RESPIRATIONS RANGE: Resp  Av  Min: 16  Max: 16  PULSE RANGE: Pulse  Av.4  Min: 73  Max: 88  BLOOD PRESSURE RANGE:  Systolic (18JJE), FVW:045 , Min:99 , AII:116 ; Diastolic (05LOT), PHR:77, Min:59, Max:83  PULSE OXIMETRY RANGE: SpO2  Av %  Min: 92 %  Max: 97 %  24HR INTAKE/OUTPUT:    Intake/Output Summary (Last 24 hours) at 3/4/2023 1108  Last data filed at 3/4/2023 0630  Gross per 24 hour   Intake 510 ml   Output 1440 ml   Net -930 ml       Constitutional: alert, appears stated age, and cooperative    Skin: Skin color, texture, turgor normal. No rashes or lesions    Head: Normocephalic, without obvious abnormality, atraumatic     Cardiovascular/Edema: regular rate and rhythm, S1, S2 normal, no murmur, click, rub or gallop    Respiratory: Lungs: clear to auscultation bilaterally    Abdomen: soft, non-tender; bowel sounds normal; no masses,  no organomegaly    Back: symmetric, no curvature. ROM normal. No CVA tenderness. Extremities: edema +    Neuro:  Grossly normal    CBC:   Recent Labs     03/02/23  0545 03/03/23  0527 03/04/23  0534   WBC 6.4 6.1 6.6   HGB 11.0* 11.1* 10.7*    297 275       BMP:    Recent Labs     03/02/23  0545 03/02/23  1120 03/03/23  0527 03/03/23  1907 03/04/23  0021 03/04/23  0534   *   < > 130* 134* 134* 136   K 3.4*  --  4.1  --   --  3.9   CL 92*  --  97*  --   --  100   CO2 20  --  23  --   --  27   BUN 41*  --  47*  --   --  35*   CREATININE 4.44*  --  4.85*  --   --  4.04*   GLUCOSE 76  --  137*  --   --  204*    < > = values in this interval not displayed.        Lab Results   Component Value Date/Time    NITRU NEGATIVE 03/02/2023 09:19 AM    COLORU Yellow 03/02/2023 09:19 AM    PHUR 5.0 03/02/2023 09:19 AM    WBCUA 0 TO 2 03/02/2023 09:19 AM    RBCUA 0 TO 2 03/02/2023 09:19 AM    MUCUS 1+ 03/27/2019 08:01 AM    TRICHOMONAS NOT REPORTED 03/27/2019 08:01 AM    YEAST NOT REPORTED 03/27/2019 08:01 AM    BACTERIA None 03/02/2023 09:19 AM    CLARITYU Clear 12/01/2020 12:00 AM    SPECGRAV 1.008 03/02/2023 09:19 AM    LEUKOCYTESUR NEGATIVE 03/02/2023 09:19 AM    UROBILINOGEN Normal 03/02/2023 09:19 AM    BILIRUBINUR NEGATIVE 03/02/2023 09:19 AM    BILIRUBINUR NEGATIVE 05/09/2012 11:18 AM    BLOODU Negative 12/01/2020 12:00 AM    GLUCOSEU NEGATIVE 03/02/2023 09:19 AM    GLUCOSEU 1+ 05/09/2012 11:18 AM    KETUA NEGATIVE 03/02/2023 09:19 AM    AMORPHOUS NOT REPORTED 03/27/2019 08:01 AM     Urine Sodium:     Lab Results   Component Value Date/Time    CALIXTO 53 03/01/2023 01:28 AM     Urine Chloride:    Lab Results   Component Value Date/Time    CLUR 23 02/24/2023 12:32 PM     Urine Osmolarity:   Lab Results   Component Value Date/Time    OSMOU 190 03/01/2023 01:28 AM     Urine Creatinine:     Lab Results   Component Value Date/Time    LABCREA 57.1 03/02/2023 12:11 AM     IMPRESSION/RECOMMENDATIONS:     1.  Acute kidney injury with chronic kidney disease stage IV - most consistent with Obstructive uropathy from urinary retention with top differential being progression of chronic kidney disease to stage V.  Renal ultrasound today showed increase cortical renal echogenicity with no hydronephrosis. She is at increasing risk for uremic complications and I have recommended dialysis and patient is now agreeable.  S/P Right IJ tunneled catheter placement 3/3/2023.    Plan:   Continue indwelling Solis catheter.  2 nd Hemodialysis with ultrafiltration today   Monitor urine output closely.  Basic metabolic profile daily.    2.  Hyponatremia - hypervolemic and hypo-osmolar as suggested by serum osmolality 268 mOsm/kg.  This suggest fluid retention consequent to advanced chronic kidney disease and treatment will be dialysis.  Fluid restriction 1.5 L/day    3.  Systemic hypertension - BP control is adequate.    4.  Anion gap metabolic acidosis - secondary to uremia-improved with dialysis discontinue oral sodium bicarbonate      follow-up for outpatient dialysis chair.  Prognosis is guarded.    Lc Hill M.D, [facility]  Nephrologist

## 2023-03-04 NOTE — PROGRESS NOTES
Dragan Valdivia is a 60 y.o. female patient. Patient is came back from Hemodialysis    No current facility-administered medications for this visit. No current outpatient medications on file.      Facility-Administered Medications Ordered in Other Visits   Medication Dose Route Frequency Provider Last Rate Last Admin    polyethylene glycol (GLYCOLAX) packet 17 g  17 g Oral Daily PRN Julián Monte MD        anticoagulant sodium citrate 4 % injection 1.6 mL  1.6 mL IntraCATHeter PRN Rosaline Nunez MD   1.6 mL at 03/04/23 1314    anticoagulant sodium citrate 4 % injection 1.6 mL  1.6 mL IntraCATHeter PRN Rosaline Nunez MD   1.6 mL at 03/04/23 1313    insulin glargine (LANTUS) injection vial 20 Units  20 Units SubCUTAneous Daily Magdalene King MD        sodium chloride flush 0.9 % injection 5-40 mL  5-40 mL IntraVENous 2 times per day BRADY Weinberg NP   10 mL at 03/04/23 1046    sodium chloride flush 0.9 % injection 5-40 mL  5-40 mL IntraVENous PRN BRADY Hinton - NP        0.9 % sodium chloride infusion   IntraVENous PRN BRADY Weinberg NP        heparin (porcine) injection 5,000 Units  5,000 Units SubCUTAneous 3 times per day BRADY Weinberg NP   5,000 Units at 03/04/23 3130    acetaminophen (TYLENOL) tablet 650 mg  650 mg Oral Q6H PRN BRADY Weinberg NP        Or    acetaminophen (TYLENOL) suppository 650 mg  650 mg Rectal Q6H PRN BRADY Weinberg NP        aspirin EC tablet 162 mg  162 mg Oral Daily BRADY Weinberg NP   162 mg at 03/02/23 0753    glucose chewable tablet 16 g  4 tablet Oral PRN BRADY Hinton - NP        dextrose bolus 10% 125 mL  125 mL IntraVENous PRN BRADY Hinton NP        Or    dextrose bolus 10% 250 mL  250 mL IntraVENous PRN BRADY Hinton - NP        glucagon (rDNA) injection 1 mg  1 mg IntraMUSCular PRN BRADY Hinton - NP        dextrose 10 % infusion   IntraVENous Continuous PRN Jesica MARII AntunezN - NP        insulin lispro (HUMALOG) injection vial 0-8 Units  0-8 Units SubCUTAneous TID  Yissel Bentley, APRN - NP        insulin lispro (HUMALOG) injection vial 0-4 Units  0-4 Units SubCUTAneous Nightly Yissel MARII BentleyN - NP        bisacodyl (DULCOLAX) EC tablet 5 mg  5 mg Oral Daily PRN Jesica Antunez APRN - NP        ferrous sulfate (IRON 325) tablet 325 mg  325 mg Oral Daily with breakfast Jesica Antunez APRN - NP   325 mg at 03/03/23 0801    pantoprazole (PROTONIX) tablet 40 mg  40 mg Oral QAM AC Jesica Antunez APRN - NP   40 mg at 03/04/23 4794    pravastatin (PRAVACHOL) tablet 20 mg  20 mg Oral Nightly Jesica Antunez APRN - NP   20 mg at 03/03/23 2039    sennosides-docusate sodium (SENOKOT-S) 8.6-50 MG tablet 2 tablet  2 tablet Oral Daily PRN Jesica Antunez APRN - NP        prochlorperazine (COMPAZINE) injection 10 mg  10 mg IntraVENous Q6H PRN Jesica Antunez APRN - NP   10 mg at 03/01/23 0413    hydrALAZINE (APRESOLINE) injection 10 mg  10 mg IntraVENous Q6H PRN Jesica Antunez APRN - NP   10 mg at 03/04/23 1046    carvedilol (COREG) tablet 6.25 mg  6.25 mg Oral BID  Launa Motley Schoenrock, APRN - CNP   6.25 mg at 03/03/23 1522    sodium bicarbonate tablet 650 mg  650 mg Oral TID Sunita Nava MD   650 mg at 03/03/23 2039     Allergies   Allergen Reactions    Ace Inhibitors      DECREAESES KIDNEY FUNCTION- ACE AND ARBS     Active Problems:    * No active hospital problems. *  Resolved Problems:    * No resolved hospital problems. *    There were no vitals taken for this visit. Subjective:  Symptoms:  Stable. No shortness of breath or chest pain. Diet:  Adequate intake. Pain:  She reports no pain. Objective:  General Appearance:  Comfortable. Vital signs: (most recent): There were no vitals taken for this visit. No fever. Heart: Normal rate. S1 normal and S2 normal.    Abdomen: Abdomen is soft.     Neurological: Patient is alert and oriented to person, place and time. Assessment & Plan    CAD status post CABG x3  Low normal LVEF of 50%  OREN on CKD  Hypertensive urgency, resolved      Blood pressures and heart rate have been fairly well controlled on current medication regimen. Recommend initiation of aspirin 81 mg to her regimen. BP today 3/2/2023 echocardiogram  Left ventricle is normal in size. Estimated LV EF 50 %. Apical hypokinesis. Mild left ventricular hypertrophy. Grade I (mild) left ventricular diastolic dysfunction. Normal right ventricular size and function. Left atrium is normal in size. Right atrium is normal in size. Aortic valve was not well visualized. No aortic stenosis. No aortic insufficiency. Normal aortic root dimension. Normal mitral valve structure and function. Mild mitral regurgitation. Normal tricuspid valve structure and function. Insignificant tricuspid  regurgitation, unable to estimate RVSP. No significant pericardial effusion is seen. IVC not well visualized. Normal aortic root dimension. Nothing further brought from cardiology standpoint. Patient will follow up with us as outpatient. We will sign off at this time.       Cielo Mcgrath  3/4/2023

## 2023-03-04 NOTE — PLAN OF CARE
Problem: Skin/Tissue Integrity  Goal: Absence of new skin breakdown  Description: 1. Monitor for areas of redness and/or skin breakdown  2. Assess vascular access sites hourly  3. Every 4-6 hours minimum:  Change oxygen saturation probe site  4. Every 4-6 hours:  If on nasal continuous positive airway pressure, respiratory therapy assess nares and determine need for appliance change or resting period.   3/4/2023 0455 by Misael Lyn RN  Outcome: Progressing  3/3/2023 1614 by Pako Case RN  Outcome: Progressing     Problem: ABCDS Injury Assessment  Goal: Absence of physical injury  3/4/2023 0455 by Misael Lyn RN  Outcome: Progressing  Flowsheets (Taken 3/3/2023 2040)  Absence of Physical Injury: Implement safety measures based on patient assessment  3/3/2023 1614 by Pako Case RN  Outcome: Progressing     Problem: Pain  Goal: Verbalizes/displays adequate comfort level or baseline comfort level  3/4/2023 0455 by Misael Lyn RN  Outcome: Progressing  3/3/2023 1614 by Pako Case RN  Outcome: Progressing     Problem: Safety - Adult  Goal: Free from fall injury  3/4/2023 0455 by Misael Lyn RN  Outcome: Progressing  3/3/2023 1614 by Pako Case RN  Outcome: Progressing     Problem: Chronic Conditions and Co-morbidities  Goal: Patient's chronic conditions and co-morbidity symptoms are monitored and maintained or improved  3/4/2023 0455 by Misael Lyn RN  Outcome: Progressing  3/3/2023 1614 by Pako Case RN  Outcome: Progressing

## 2023-03-04 NOTE — PROGRESS NOTES
[facility] Internal Medicine  Poonam Velazquez MD; Christiano Cheung MD; Sherryle Mori, MD; MD Rama Gonzalez MD; Micah Briggs MD  [facility]   [address] [facility]                 Date:   3/4/2023  Patientname:  Fareed Gillis  Date of admission:  2/28/2023 11:22 PM  MRN:   [id number]  Account:  [de-identified]  YOB: 1960  PCP:    Nahun Patterson MD  Room:   2081/2081-01  Code Status:    Full Code      Chief Complaint:     Chief Complaint   Patient presents with    Emesis    Other       History of Present Illness:     Arlene Gillis is a 60 y.o. Non- / non  female who presents with Emesis and is admitted to the hospital for the management of Acute kidney injury superimposed on chronic kidney disease ([facility] [address] Extensive medical history of CAD, CHF, CKD 4, DM and HTN. Patient and  only speaks Korean, communicated with video . Patient is being followed closely by nephrology as outpatient due to stage IV CKD. She presented with nausea and vomiting. She presented with nausea and vomiting. Initial lab work shows CR 3.5 which is actually improved from CR 4.17 on 12/24/2023. Electrolyte imbalances noted with sodium 123, potassium 2.8, magnesium 1.5. Placement initiated in ED. Patient is also hypertensive, primary cardiologist Dr. Mino Holbrook consulted and requested starting nifedipine. Troponin slightly elevated at 41 & 46. Patient denies chest pain or shortness of breath. Denies fever, chills, chest pain, cough, diarrhea, and urinary symptoms. There are no aggravating or alleviating factors. Symptoms are reported as acute, constant and moderate in severity.     Communication was done with the help of video  [id number]  Nausea is better  Vitals are stable  3/1  Patient feels that her face is swollen,  Felt like the face was puffy this morning, was hypoglycemic, vitals were stable. As per HPI rest of review of system negative. Physical exam  General : Patient alert awake in no acute distress  HEENT : Atraumatic, normocephalic , oral mucosa membrane moist,  Eyes : Extraocular movements intact  Neck : Supple, range of motion intact,  Cardiovascular : Regular rate and rhythm, no murmur appreciated  Respiratory : Bilateral clear breath sounds, no wheezing crackles appreciated  Gastrointestinal  : Abdomen soft, nontender, bowel sounds present  Extremities : No pedal edema clubbing or cyanosis present  Skin : Warm and dry, no skin lesions appreciated  Psych : Mood normal     Past Medical History:     Past Medical History:   Diagnosis Date    CAD (coronary artery disease)     CHF (congestive heart failure) ([facility])     CKD (chronic kidney disease) stage 3, GFR 30-59 ml/min ([facility])     Diabetes mellitus ([facility])     Hyperkalemia     Hypertension     Microscopic hematuria     Proteinuria     Renal failure     Status post coronary artery bypass grafting 2010    Type II or unspecified type diabetes mellitus without mention of complication, not stated as uncontrolled     UTI (urinary tract infection)         Past Surgical History:     Past Surgical History:   Procedure Laterality Date    CARDIAC SURGERY      CATARACT EXTRACTION EXTRACAPSULAR W/ INTRAOCULAR LENS IMPLANTATION Right 09/01/2022    [facility]/Complex with VisionBlue and iris stretching    CATARACT REMOVAL Right 9/1/2022    EYE CATARACT EMULSIFICATION IOL IMPLANT w/ VISION BLUE AND PERIBULBAR BLOCK performed by Ander Esquivel MD at [address] [place] [address]      IR TUNNELED CATHETER PLACEMENT GREATER THAN 5 YEARS  3/3/2023    IR TUNNELED CATHETER PLACEMENT GREATER THAN 5 YEARS 3/3/2023 [facility]        Medications Prior to Admission:     Prior to Admission medications    Medication Sig Start Date End Date Taking?  Authorizing Provider   omeprazole (PRILOSEC) 40 MG delayed release capsule TAKE 1 CAPSULE BY MOUTH ONCE DAILY FOR 90 DAYS 11/27/22  Yes Historical Provider, MD   BISACODYL 5 MG EC tablet USE AS DIRECTED 12/10/21  Yes Historical Provider, MD   magnesium oxide (MAG-OX) 400 (241.3 Mg) MG TABS tablet TAKE 1 TABLET BY MOUTH ONCE DAILY WITH FOOD FOR 90 DAYS 10/26/20  Yes Historical Provider, MD   Sennosides-Docusate Sodium 8.6-50 MG CAPS Take 2 tablets by mouth daily   Yes Historical Provider, MD Eran Longoria KWIKPEN 100 UNIT/ML injection pen Inject 42 Units into the skin daily 5/16/19  Yes Historical Provider, MD   sodium bicarbonate 650 MG tablet Take 650 mg by mouth daily 5/16/19  Yes Historical Provider, MD   ramipril (ALTACE) 2.5 MG capsule Take 1 capsule by mouth daily  Patient taking differently: Take 2.5 mg by mouth daily Hold 3/28/19  Yes Melody Ramírez MD   carvedilol (COREG) 6.25 MG tablet Take 1 tablet by mouth 2 times daily  Patient taking differently: Take 3.125 mg by mouth 2 times daily 3/27/19  Yes Melody Ramírez MD   pravastatin (PRAVACHOL) 20 MG tablet Take 20 mg by mouth nightly   Yes Historical Provider, MD   aspirin 81 MG EC tablet Take 162 mg by mouth daily    Yes Historical Provider, MD   potassium chloride (KLOR-CON M) 20 MEQ extended release tablet Take 2 tablets by mouth daily for 4 doses Stop Lokelma - take potassium Chloride 40mEq today and 40mEq tomorrow recheck potassium on Monday morning 2/24/23 2/28/23  Lalo Cheng MD   ferrous sulfate (IRON 325) 325 (65 Fe) MG tablet Take by mouth daily    Historical Provider, MD   vitamin D3 (CHOLECALCIFEROL) 125 MCG (5000 UT) TABS tablet 1,000 Units daily    Historical Provider, MD   TRUE METRIX BLOOD GLUCOSE TEST strip USE AS DIRECTED ONCE DAILY 10/26/20   Historical Provider, MD   TRUEplus Lancets 30G MISC USE TWICE DAILY 10/26/20   Historical Provider, MD   Blood Pressure Monitor KIT Take Bp daily 11/23/20   Lalo Cheng MD        Allergies:     Ace inhibitors    Social History: Tobacco:    reports that she has never smoked. She has never used smokeless tobacco.  Alcohol:      reports no history of alcohol use. Drug Use:  reports no history of drug use.     Family History:     Family History   Problem Relation Age of Onset    Heart Attack Mother     Diabetes Mother     Diabetes Father        Investigations:      Laboratory Testing:  Recent Results (from the past 25 hour(s))   Hepatitis B Surface Antibody    Collection Time: 03/03/23  7:07 PM   Result Value Ref Range    Hep B S Ab <3.50 <10 mIU/mL   Magnesium    Collection Time: 03/03/23  7:07 PM   Result Value Ref Range    Magnesium 2.1 1.6 - 2.6 mg/dL   Sodium    Collection Time: 03/03/23  7:07 PM   Result Value Ref Range    Sodium 134 (L) 135 - 144 mmol/L   POC Glucose Fingerstick    Collection Time: 03/03/23  8:24 PM   Result Value Ref Range    POC Glucose 248 (H) 65 - 105 mg/dL   Sodium    Collection Time: 03/04/23 12:21 AM   Result Value Ref Range    Sodium 134 (L) 135 - 144 mmol/L   CBC with Auto Differential    Collection Time: 03/04/23  5:34 AM   Result Value Ref Range    WBC 6.6 3.5 - 11.0 k/uL    RBC 4.01 4.0 - 5.2 m/uL    Hemoglobin 10.7 (L) 12.0 - 16.0 g/dL    Hematocrit 32.5 (L) 36 - 46 %    MCV 81.1 80 - 100 fL    MCH 26.8 26 - 34 pg    MCHC 33.0 31 - 37 g/dL    RDW 15.7 (H) 11.5 - 14.9 %    Platelets 087 355 - 156 k/uL    MPV 8.1 6.0 - 12.0 fL    Seg Neutrophils 58 36 - 66 %    Lymphocytes 33 24 - 44 %    Monocytes 8 (H) 1 - 7 %    Eosinophils % 0 0 - 4 %    Basophils 1 0 - 2 %    Segs Absolute 3.82 1.3 - 9.1 k/uL    Absolute Lymph # 2.18 1.0 - 4.8 k/uL    Absolute Mono # 0.53 0.1 - 1.3 k/uL    Absolute Eos # 0.00 0.0 - 0.4 k/uL    Basophils Absolute 0.07 0.0 - 0.2 k/uL    Morphology ANISOCYTOSIS PRESENT    Hepatitis B Surface Antibody    Collection Time: 03/04/23  5:34 AM   Result Value Ref Range    Hep B S Ab 4.32 <10 mIU/mL   Basic Metabolic Panel    Collection Time: 03/04/23  5:34 AM   Result Value Ref Range Glucose 204 (H) 70 - 99 mg/dL    BUN 35 (H) 8 - 23 mg/dL    Creatinine 4.04 (H) 0.50 - 0.90 mg/dL    Est, Glom Filt Rate 12 (L) >60 mL/min/1.73m2    Calcium 8.1 (L) 8.6 - 10.4 mg/dL    Sodium 136 135 - 144 mmol/L    Potassium 3.9 3.7 - 5.3 mmol/L    Chloride 100 98 - 107 mmol/L    CO2 27 20 - 31 mmol/L    Anion Gap 9 9 - 17 mmol/L   POC Glucose Fingerstick    Collection Time: 03/04/23  6:59 AM   Result Value Ref Range    POC Glucose 185 (H) 65 - 105 mg/dL   POC Glucose Fingerstick    Collection Time: 03/04/23 11:19 AM   Result Value Ref Range    POC Glucose 160 (H) 65 - 105 mg/dL       Imaging/Diagnostics:  No results found.       Plan:     Patient status inpatient in the Progressive Unit/Step down    OREN superimposed on CKD4  -Creatinine cr 3.5; Baseline 2.0  -history of CKD  -Nephrology consulted in ED  -NS infusion  -hold diuretics/nephrotoxic medications  -monitor BMP    Hypokalemia  -K+ replaced with 1x dose  -recheck BMP in am    Hypomagnesemia  -Mag level - 1.5  - 1 gm IV replacement ordered  -monitor magnesium level daily    Hyponatremia  -Sodium level -123  -check sodium level every 4 hours for now  -IV NS at 75ml/hr per nephrology    HTN  -cardiology consulted in ED  -started on Nifedipine, hydralazine prn  -Continue home BP meds  -Monitor VS    Nausea/vomiting  -compazine prn  -consider abdominal imaging if unresolved    Nausea vomiting is better now, will give 40 p.o. potassium as well, potassium was 2.8 this morning,  If no improvement in the nausea and vomiting, by today evening will do imaging,  Sodium worse at 121, will inform nephrology,  Seen by cardiology no intervention at this point  Creatinine 3.49.      3/\2  Patient was found to be retaining 800 mL urine last night, ultrasound kidneys pending, discussed with Dr. Ayush Washington recommended to put order for IR tunneled catheter for dialysis  Continues to be hyponatremic with sodium of 123  Patient was hypoglycemic this morning with blood sugar dropped to 50, getting 42 units of Lantus at home, will decrease it to 20 units, potassium replaced, urine culture is negative. AST was high, patient hepatitis C positive, get ultrasound liver, patient to follow-up with GI as outpatient. 3/3  Solis draining urine  Patient received first session of dialysis today  Sugars controlled  Outpatient dialysis slot awaited    3/4  Receiving dialysis second session with ultrafiltration today  No new issues  Urine output good  Sodium normalized  Awaiting outpatient dialysis slot  Complaining of constipation today--we will add Colace, abdomen soft, bowel sounds active on exam      James Robles MD  3/4/2023  4:44 PM      Please note that this chart was generated using voice recognition Dragon dictation software. Although every effort was made to ensure the accuracy of this automated transcription, some errors in transcription may have occurred. Rylee Roberts [address] [place] [address].    Phone [phone]

## 2023-03-04 NOTE — PROGRESS NOTES
Nurse used video language interpretor (id [phone]). Nurse updated patient on plan of care for the day, and answered any questions.

## 2023-03-04 NOTE — PROGRESS NOTES
Progress Note  Date:3/4/2023       Room:Room/bed info not found  Patient Name:Arlene Bear     YOB: 1960     Age:62 y.o. Subjective    Subjective   Review of Systems  Objective         Vitals Last 24 Hours:  TEMPERATURE:  Temp  Av.3 °F (36.8 °C)  Min: 97 °F (36.1 °C)  Max: 99 °F (37.2 °C)  RESPIRATIONS RANGE: Resp  Av  Min: 16  Max: 16  PULSE OXIMETRY RANGE: SpO2  Av %  Min: 92 %  Max: 97 %  PULSE RANGE: Pulse  Av.7  Min: 74  Max: 88  BLOOD PRESSURE RANGE: Systolic (18YGL), YAHAIRA:226 , Min:117 , JCN:162   ; Diastolic (79BBZ), WLI:80, Min:60, Max:83    I/O (24Hr): Intake/Output Summary (Last 24 hours) at 3/4/2023 1349  Last data filed at 3/4/2023 1312  Gross per 24 hour   Intake 1010 ml   Output 2940 ml   Net -1930 ml     Objective  Labs/Imaging/Diagnostics    Labs:  CBC:  Recent Labs     23  0545 23  0527 23  0534   WBC 6.4 6.1 6.6   RBC 4.10 4.11 4.01   HGB 11.0* 11.1* 10.7*   HCT 32.4* 33.1* 32.5*   MCV 78.9* 80.5 81.1   RDW 15.2* 15.1* 15.7*    297 275     CHEMISTRIES:  Recent Labs     23  0545 23  1120 23  0527 23  1907 23  0021 23  0534   *   < > 130* 134* 134* 136   K 3.4*  --  4.1  --   --  3.9   CL 92*  --  97*  --   --  100   CO2 -  23  --   --  27   BUN 41*  --  47*  --   --  35*   CREATININE 4.44*  --  4.85*  --   --  4.04*   GLUCOSE 76  --  137*  --   --  204*   MG 2.2  --   --  2.1  --   --     < > = values in this interval not displayed. PT/INR:  Recent Labs     23  0931   PROTIME 13.8   INR 1.0     APTT:No results for input(s): APTT in the last 72 hours.   LIVER PROFILE:  Recent Labs     23  0545 23  0527   AST 50* 46*   ALT 25 28   BILITOT 0.3 0.3   ALKPHOS 127* 132*       Imaging Last 24 Hours:  XR CHEST PORTABLE    Result Date: 3/2/2023  EXAMINATION: ONE XRAY VIEW OF THE CHEST 3/2/2023 1:35 pm COMPARISON: 2019 HISTORY: ORDERING SYSTEM PROVIDED HISTORY: for outpt dialysis, make sure she doesn't have tb TECHNOLOGIST PROVIDED HISTORY: for outpt dialysis, make sure she doesn't have tb Reason for Exam: for outpt dialysis, make sure she doesn't have tb FINDINGS: Chest radiograph: The cardiomediastinal silhouette and hilar contours are normal. The lungs are clear with no focal consolidation, pleural effusion or pneumothorax. The overlying soft tissue and osseous structures do not demonstrate acute abnormality. Status post sternotomy. No acute intrathoracic pathology. IR TUNNELED CVC PLACE WO SQ PORT/PUMP > 5 YEARS    Result Date: 3/3/2023  PROCEDURE: ULTRASOUND GUIDED VASCULAR ACCESS. FLUOROSCOPY GUIDED PLACEMENT OF A TUNNELED CATHETER. 3/2/2023. HISTORY: ORDERING SYSTEM PROVIDED HISTORY: hd TECHNOLOGIST PROVIDED HISTORY: hd How many lumens are being requested?->2 What side should this line be placed?->Right What site is the preferred site? ->Internal Jugular SEDATION: None FLUOROSCOPY DOSE AND TYPE: Radiation Exposure Index: 33.5 seconds; cumulative dose 2.99 mGy, TECHNIQUE: This procedure was performed by Dr. Qiana Roland. Informed consent was obtained (via the  services) after a detailed explanation of the procedure including risks, benefits, and alternatives. All elements of maximal sterile barrier technique were utilized. Standard timeout was performed using two patient identifiers. The  patient was pretreated with intravenous antibiotics. Initial ultrasound evaluation shows the right internal jugular vein to be patent and accessible. Static image was obtained for the patient's medical record. The right neck and chest were prepped and draped in standard fashion. Using realtime ultrasound guidance the right internal jugular vein was accessed with a micropuncture needle and guidewire advanced centrally.   Transitional dilator was placed which allowed placement of an 0.035 guidewire which had its tip directed under fluoroscopy to the inferior vena cava. A subcutaneous tunnel was created in the right upper chest from the infraclavicular region to the venotomy site. 14.5 Macedonian 19 cm palindrome tunneled dialysis catheter was pulled through the tunnel with the cuff placed in the midportion of the subcutaneous tunnel. The venotomy site was dilated over the guidewire and a peel-away sheath placed. Tunneled catheter was advanced through the peel-away sheath and had its tip directed under fluoroscopy to the region of the proximal right atrium. Both lumen are widely patent on completion and were locked with heparin. The catheter was sutured in place and a sterile dressing applied. There are no immediate complications. The patient left the department in stable condition. EBL: Less than 3 mL FINDINGS: Fluoroscopic image demonstrates the tip of the catheter in the proximal right atrium. Successful ultrasound and fluoroscopy guided tunneled catheter placement . FLUORO FOR SURGICAL PROCEDURES    Result Date: 3/3/2023  Radiology exam is complete. No Radiologist dictation. Please follow up with ordering provider.      Assessment//Plan           Assessment & Plan    Electronically signed by Sarah Bolaños on 3/4/23 at 1:49 PM EST normal S1, S2 heard

## 2023-03-05 LAB
GLUCOSE BLD-MCNC: 148 MG/DL (ref 65–105)
GLUCOSE BLD-MCNC: 149 MG/DL (ref 65–105)
GLUCOSE BLD-MCNC: 151 MG/DL (ref 65–105)
GLUCOSE BLD-MCNC: 159 MG/DL (ref 65–105)

## 2023-03-05 PROCEDURE — 82947 ASSAY GLUCOSE BLOOD QUANT: CPT

## 2023-03-05 PROCEDURE — 6370000000 HC RX 637 (ALT 250 FOR IP): Performed by: INTERNAL MEDICINE

## 2023-03-05 PROCEDURE — 6360000002 HC RX W HCPCS: Performed by: NURSE PRACTITIONER

## 2023-03-05 PROCEDURE — 6370000000 HC RX 637 (ALT 250 FOR IP): Performed by: NURSE PRACTITIONER

## 2023-03-05 PROCEDURE — 2580000003 HC RX 258: Performed by: NURSE PRACTITIONER

## 2023-03-05 PROCEDURE — 2060000000 HC ICU INTERMEDIATE R&B

## 2023-03-05 PROCEDURE — 99232 SBSQ HOSP IP/OBS MODERATE 35: CPT | Performed by: INTERNAL MEDICINE

## 2023-03-05 RX ORDER — CARVEDILOL 12.5 MG/1
12.5 TABLET ORAL 2 TIMES DAILY WITH MEALS
Status: DISCONTINUED | OUTPATIENT
Start: 2023-03-05 | End: 2023-03-07

## 2023-03-05 RX ADMIN — HYDRALAZINE HYDROCHLORIDE 10 MG: 20 INJECTION INTRAMUSCULAR; INTRAVENOUS at 23:44

## 2023-03-05 RX ADMIN — CARVEDILOL 6.25 MG: 6.25 TABLET, FILM COATED ORAL at 09:43

## 2023-03-05 RX ADMIN — SODIUM BICARBONATE 650 MG: 650 TABLET ORAL at 09:43

## 2023-03-05 RX ADMIN — HYDRALAZINE HYDROCHLORIDE 10 MG: 20 INJECTION INTRAMUSCULAR; INTRAVENOUS at 09:43

## 2023-03-05 RX ADMIN — CARVEDILOL 12.5 MG: 12.5 TABLET, FILM COATED ORAL at 17:39

## 2023-03-05 RX ADMIN — HEPARIN SODIUM 5000 UNITS: 5000 INJECTION INTRAVENOUS; SUBCUTANEOUS at 06:05

## 2023-03-05 RX ADMIN — SODIUM BICARBONATE 650 MG: 650 TABLET ORAL at 21:59

## 2023-03-05 RX ADMIN — HEPARIN SODIUM 5000 UNITS: 5000 INJECTION INTRAVENOUS; SUBCUTANEOUS at 15:44

## 2023-03-05 RX ADMIN — FERROUS SULFATE TAB 325 MG (65 MG ELEMENTAL FE) 325 MG: 325 (65 FE) TAB at 17:39

## 2023-03-05 RX ADMIN — PRAVASTATIN SODIUM 20 MG: 20 TABLET ORAL at 21:59

## 2023-03-05 RX ADMIN — HEPARIN SODIUM 5000 UNITS: 5000 INJECTION INTRAVENOUS; SUBCUTANEOUS at 21:58

## 2023-03-05 RX ADMIN — SODIUM CHLORIDE, PRESERVATIVE FREE 10 ML: 5 INJECTION INTRAVENOUS at 09:47

## 2023-03-05 RX ADMIN — SENNOSIDES AND DOCUSATE SODIUM 2 TABLET: 50; 8.6 TABLET ORAL at 09:43

## 2023-03-05 RX ADMIN — POLYETHYLENE GLYCOL 3350 17 G: 17 POWDER, FOR SOLUTION ORAL at 09:43

## 2023-03-05 RX ADMIN — ASPIRIN 162 MG: 81 TABLET, COATED ORAL at 09:43

## 2023-03-05 RX ADMIN — SODIUM CHLORIDE, PRESERVATIVE FREE 10 ML: 5 INJECTION INTRAVENOUS at 21:59

## 2023-03-05 RX ADMIN — PANTOPRAZOLE SODIUM 40 MG: 40 TABLET, DELAYED RELEASE ORAL at 06:05

## 2023-03-05 RX ADMIN — INSULIN GLARGINE 20 UNITS: 100 INJECTION, SOLUTION SUBCUTANEOUS at 09:43

## 2023-03-05 RX ADMIN — SODIUM BICARBONATE 650 MG: 650 TABLET ORAL at 15:44

## 2023-03-05 NOTE — PROGRESS NOTES
[facility] Internal Medicine  Antonieta Raymond MD; Malcolm Barahona MD; Meng Kaur MD; MD Peggy Meredith MD; Luciana Franklin MD  ABA EAGLE [facility] Internal Medicine   [address] [facility]                 Date:   3/5/2023  Patientname:  Gina Scruggs  Date of admission:  2/28/2023 11:22 PM  MRN:   [id number]  Account:  [de-identified]  YOB: 1960  PCP:    Elias Allen MD  Room:   2081/2081-01  Code Status:    Full Code      Chief Complaint:     Chief Complaint   Patient presents with    Emesis    Other       History of Present Illness:     Arlene Scruggs is a 60 y.o. Non- / non  female who presents with Emesis and is admitted to the hospital for the management of Acute kidney injury superimposed on chronic kidney disease ([facility] [address] Extensive medical history of CAD, CHF, CKD 4, DM and HTN. Patient and  only speaks Welsh, communicated with video . Patient is being followed closely by nephrology as outpatient due to stage IV CKD. She presented with nausea and vomiting. She presented with nausea and vomiting. Initial lab work shows CR 3.5 which is actually improved from CR 4.17 on 12/24/2023. Electrolyte imbalances noted with sodium 123, potassium 2.8, magnesium 1.5. Placement initiated in ED. Patient is also hypertensive, primary cardiologist Dr. Gianni Nath consulted and requested starting nifedipine. Troponin slightly elevated at 41 & 46. Patient denies chest pain or shortness of breath. Denies fever, chills, chest pain, cough, diarrhea, and urinary symptoms. There are no aggravating or alleviating factors. Symptoms are reported as acute, constant and moderate in severity.     Communication was done with the help of video  [id number]  Nausea is better  Vitals are stable  3/1  Patient feels that her face is swollen,  Felt like the face was puffy this morning, was hypoglycemic, vitals were stable.  As per HPI rest of review of system negative.      Physical exam  General : Patient alert awake in no acute distress  HEENT : Atraumatic, normocephalic , oral mucosa membrane moist,  Eyes : Extraocular movements intact  Neck : Supple, range of motion intact,  Cardiovascular : Regular rate and rhythm, no murmur appreciated  Respiratory : Bilateral clear breath sounds, no wheezing crackles appreciated  Gastrointestinal  : Abdomen soft, nontender, bowel sounds present  Extremities : No pedal edema clubbing or cyanosis present  Skin : Warm and dry, no skin lesions appreciated  Psych : Mood normal     Past Medical History:     Past Medical History:   Diagnosis Date    CAD (coronary artery disease)     CHF (congestive heart failure) ([facility])     CKD (chronic kidney disease) stage 3, GFR 30-59 ml/min ([facility])     Diabetes mellitus ([facility])     Hyperkalemia     Hypertension     Microscopic hematuria     Proteinuria     Renal failure     Status post coronary artery bypass grafting 2010    Type II or unspecified type diabetes mellitus without mention of complication, not stated as uncontrolled     UTI (urinary tract infection)         Past Surgical History:     Past Surgical History:   Procedure Laterality Date    CARDIAC SURGERY      CATARACT EXTRACTION EXTRACAPSULAR W/ INTRAOCULAR LENS IMPLANTATION Right 09/01/2022    Judi/Avery/Complex with VisionBlue and iris stretching    CATARACT REMOVAL Right 9/1/2022    EYE CATARACT EMULSIFICATION IOL IMPLANT w/ VISION BLUE AND PERIBULBAR BLOCK performed by Alejandra Lau MD at [facility] OR    CHOLECYSTECTOMY      CORONARY ARTERY BYPASS GRAFT      IR TUNNELED CATHETER PLACEMENT GREATER THAN 5 YEARS  3/3/2023    IR TUNNELED CATHETER PLACEMENT GREATER THAN 5 YEARS 3/3/2023 [facility] SPECIAL PROCEDURES        Medications Prior to Admission:     Prior to Admission medications    Medication Sig Start Date End Date Taking? Authorizing Provider   omeprazole (PRILOSEC) 40 MG delayed  release capsule TAKE 1 CAPSULE BY MOUTH ONCE DAILY FOR 90 DAYS 11/27/22  Yes Historical Provider, MD   BISACODYL 5 MG EC tablet USE AS DIRECTED 12/10/21  Yes Historical Provider, MD   magnesium oxide (MAG-OX) 400 (241.3 Mg) MG TABS tablet TAKE 1 TABLET BY MOUTH ONCE DAILY WITH FOOD FOR 90 DAYS 10/26/20  Yes Historical Provider, MD   Sennosides-Docusate Sodium 8.6-50 MG CAPS Take 2 tablets by mouth daily   Yes Historical Provider, MD Kevin Ac KWIKPEN 100 UNIT/ML injection pen Inject 42 Units into the skin daily 5/16/19  Yes Historical Provider, MD   sodium bicarbonate 650 MG tablet Take 650 mg by mouth daily 5/16/19  Yes Historical Provider, MD   ramipril (ALTACE) 2.5 MG capsule Take 1 capsule by mouth daily  Patient taking differently: Take 2.5 mg by mouth daily Hold 3/28/19  Yes Jacey Acosta MD   carvedilol (COREG) 6.25 MG tablet Take 1 tablet by mouth 2 times daily  Patient taking differently: Take 3.125 mg by mouth 2 times daily 3/27/19  Yes Jacey Acosta MD   pravastatin (PRAVACHOL) 20 MG tablet Take 20 mg by mouth nightly   Yes Historical Provider, MD   aspirin 81 MG EC tablet Take 162 mg by mouth daily    Yes Historical Provider, MD   potassium chloride (KLOR-CON M) 20 MEQ extended release tablet Take 2 tablets by mouth daily for 4 doses Stop Lokelma - take potassium Chloride 40mEq today and 40mEq tomorrow recheck potassium on Monday morning 2/24/23 2/28/23  Jey Narayan MD   ferrous sulfate (IRON 325) 325 (65 Fe) MG tablet Take by mouth daily    Historical Provider, MD   vitamin D3 (CHOLECALCIFEROL) 125 MCG (5000 UT) TABS tablet 1,000 Units daily    Historical Provider, MD   TRUE METRIX BLOOD GLUCOSE TEST strip USE AS DIRECTED ONCE DAILY 10/26/20   Historical Provider, MD   TRUEplus Lancets 30G MISC USE TWICE DAILY 10/26/20   Historical Provider, MD   Blood Pressure Monitor KIT Take Bp daily 11/23/20   Jey Narayan MD        Allergies:     Ace inhibitors    Social History: Tobacco:    reports that she has never smoked. She has never used smokeless tobacco.  Alcohol:      reports no history of alcohol use. Drug Use:  reports no history of drug use. Family History:     Family History   Problem Relation Age of Onset    Heart Attack Mother     Diabetes Mother     Diabetes Father        Investigations:      Laboratory Testing:  Recent Results (from the past 24 hour(s))   POC Glucose Fingerstick    Collection Time: 03/04/23 11:19 AM   Result Value Ref Range    POC Glucose 160 (H) 65 - 105 mg/dL   POC Glucose Fingerstick    Collection Time: 03/04/23  4:28 PM   Result Value Ref Range    POC Glucose 306 (H) 65 - 105 mg/dL   POC Glucose Fingerstick    Collection Time: 03/04/23  8:20 PM   Result Value Ref Range    POC Glucose 139 (H) 65 - 105 mg/dL   POC Glucose Fingerstick    Collection Time: 03/05/23  8:17 AM   Result Value Ref Range    POC Glucose 151 (H) 65 - 105 mg/dL       Imaging/Diagnostics:  No results found.       Plan:     Patient status inpatient in the Progressive Unit/Step down    OREN superimposed on CKD4  -Creatinine cr 3.5; Baseline 2.0  -history of CKD  -Nephrology consulted in ED  -NS infusion  -hold diuretics/nephrotoxic medications  -monitor BMP    Hypokalemia  -K+ replaced with 1x dose  -recheck BMP in am    Hypomagnesemia  -Mag level - 1.5  - 1 gm IV replacement ordered  -monitor magnesium level daily    Hyponatremia  -Sodium level -123  -check sodium level every 4 hours for now  -IV NS at 75ml/hr per nephrology    HTN  -cardiology consulted in ED  -started on Nifedipine, hydralazine prn  -Continue home BP meds  -Monitor VS    Nausea/vomiting  -compazine prn  -consider abdominal imaging if unresolved    Nausea vomiting is better now, will give 40 p.o. potassium as well, potassium was 2.8 this morning,  If no improvement in the nausea and vomiting, by today evening will do imaging,  Sodium worse at 121, will inform nephrology,  Seen by cardiology no intervention at this point  Creatinine 3.49.      3/\2  Patient was found to be retaining 800 mL urine last night, ultrasound kidneys pending, discussed with . Governor Destiny recommended to put order for IR tunneled catheter for dialysis  Continues to be hyponatremic with sodium of 123  Patient was hypoglycemic this morning with blood sugar dropped to 50, getting 42 units of Lantus at home, will decrease it to 20 units, potassium replaced, urine culture is negative. AST was high, patient hepatitis C positive, get ultrasound liver, patient to follow-up with GI as outpatient. 3/3  Solis draining urine  Patient received first session of dialysis today  Sugars controlled  Outpatient dialysis slot awaited    3/4  Receiving dialysis second session with ultrafiltration today  No new issues  Urine output good  Sodium normalized  Awaiting outpatient dialysis slot  Complaining of constipation today--we will add Colace, abdomen soft, bowel sounds active on exam    3/5  Blood pressure running high today-increasing dose of Coreg  No acute issues overnight, awaiting outpatient dialysis    Fuad Roland MD  3/5/2023  9:47 AM      Please note that this chart was generated using voice recognition Dragon dictation software. Although every effort was made to ensure the accuracy of this automated transcription, some errors in transcription may have occurred. [facility]: AURORA Roberts [address] [place] [address], [address] [place] [address].    Phone [phone]

## 2023-03-05 NOTE — CARE COORDINATION
ONGOING DISCHARGE PLAN:    Unable to speak to pt. At this time. Pt. Is from [facility] & speaks Setswana. From [facility] w/ . Cr today 4.04. Nephro on board. LSW is following for Outpatient Dialysis slot. Elevated BP today. Will continue to follow for additional discharge needs.     Electronically signed by Binta Lazaro RN on 3/5/2023 at 2:59 PM

## 2023-03-05 NOTE — PLAN OF CARE
Problem: Pain  Goal: Verbalizes/displays adequate comfort level or baseline comfort level  3/5/2023 0330 by Miranda Barba RN  Outcome: Progressing  3/4/2023 1915 by Doug Cummins RN  Outcome: Progressing     Problem: Skin/Tissue Integrity  Goal: Absence of new skin breakdown  Description: 1. Monitor for areas of redness and/or skin breakdown  2. Assess vascular access sites hourly  3. Every 4-6 hours minimum:  Change oxygen saturation probe site  4. Every 4-6 hours:  If on nasal continuous positive airway pressure, respiratory therapy assess nares and determine need for appliance change or resting period.   3/5/2023 0330 by Miranda Barba RN  Outcome: Progressing  3/4/2023 1915 by Doug Cummins RN  Outcome: Progressing     Problem: ABCDS Injury Assessment  Goal: Absence of physical injury  Outcome: Progressing  Flowsheets (Taken 3/4/2023 2036)  Absence of Physical Injury: Implement safety measures based on patient assessment     Problem: Safety - Adult  Goal: Free from fall injury  Outcome: Progressing  Flowsheets (Taken 3/4/2023 2036)  Free From Fall Injury: Instruct family/caregiver on patient safety     Problem: Chronic Conditions and Co-morbidities  Goal: Patient's chronic conditions and co-morbidity symptoms are monitored and maintained or improved  Outcome: Progressing

## 2023-03-05 NOTE — PROGRESS NOTES
Department of Internal Medicine  Nephrology Lc Torres MD Progress Note    Reason for consultation: Management of acute kidney injury and chronic kidney disease stage IV.    Consulting physician: Lianna Hahn MD.  Encounter was in the presence of RN as well as with the assistance of a video .    Interval history: Patient was seen and examined today at dialysis.  She had a right IJ tunneled catheter placed  She is seen today on her third treatment and complains of constipation.  Patient has a Solis catheter in place due to postvoid residual of 800 cc .  Patient had a urine output of 1.3 L yesterday  Renal ultrasound showed no hydronephrosis increased echogenicity of the bilateral renal cortex.      History of present illness: This is a 62 y.o. 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  I saw patient in the office last week Friday 2/24/23 and recommended dialysis but she refused at this time as she was planning to travel to [place] to visit family in middle of March and she will be gone for 5 months.  She presented to the emergency department last night with 2 episodes of nausea and vomiting associated with uncontrolled hypertension.  Vital signs at presentation were remarkable for blood pressure 218/89 mmHg. Studies at presentation [2/28/2023] was remarkable for BUNs/creatinine 35/3.50 mg/dL; Sodium 123 mmol/L and potassium 2.8 mmol/L.  Today, blood pressure control is better and patient has nausea but no vomiting.  Appetite is decreased.  She denies shortness of breath.    Scheduled Meds:   carvedilol  12.5 mg Oral BID WC    sennosides-docusate sodium  2 tablet Oral Daily     insulin glargine  20 Units SubCUTAneous Daily    sodium chloride flush  5-40 mL IntraVENous 2 times per day    heparin (porcine)  5,000 Units SubCUTAneous 3 times per day    aspirin  162 mg Oral Daily    insulin lispro  0-8 Units SubCUTAneous TID WC    insulin lispro  0-4 Units SubCUTAneous Nightly    ferrous sulfate  325 mg Oral Daily with breakfast    pantoprazole  40 mg Oral QAM AC    pravastatin  20 mg Oral Nightly    sodium bicarbonate  650 mg Oral TID     Continuous Infusions:   sodium chloride      dextrose       PRN Meds:.polyethylene glycol, anticoagulant sodium citrate, anticoagulant sodium citrate, sodium chloride flush, sodium chloride, acetaminophen **OR** acetaminophen, glucose, dextrose bolus **OR** dextrose bolus, glucagon (rDNA), dextrose, bisacodyl, prochlorperazine, hydrALAZINE    Physical Exam:    VITALS:  BP (!) 147/66   Pulse 82   Temp 98.6 °F (37 °C) (Oral)   Resp 16   Ht 5' (1.524 m)   Wt 124 lb 9 oz (56.5 kg)   SpO2 97%   BMI 24.33 kg/m²   TEMPERATURE:  Current - Temp: 98.6 °F (37 °C);  Max - Temp  Av.6 °F (37 °C)  Min: 98.4 °F (36.9 °C)  Max: 98.8 °F (37.1 °C)  RESPIRATIONS RANGE: Resp  Av.3  Min: 16  Max: 20  PULSE RANGE: Pulse  Av.7  Min: 77  Max: 92  BLOOD PRESSURE RANGE:  Systolic (34RFJ), BHA:163 , Min:142 , VIT:098 ; Diastolic (87KJS), LCX:97, Min:66, Max:91  PULSE OXIMETRY RANGE: SpO2  Av.3 %  Min: 92 %  Max: 97 %  24HR INTAKE/OUTPUT:    Intake/Output Summary (Last 24 hours) at 3/5/2023 1455  Last data filed at 3/5/2023 1233  Gross per 24 hour   Intake 610 ml   Output 775 ml   Net -165 ml       Constitutional: alert, appears stated age, and cooperative    Skin: Skin color, texture, turgor normal. No rashes or lesions    Head: Normocephalic, without obvious abnormality, atraumatic     Cardiovascular/Edema: regular rate and rhythm, S1, S2 normal, no murmur, click, rub or gallop    Respiratory: Lungs: clear to auscultation bilaterally    Abdomen: soft, non-tender; bowel sounds normal; no masses,  no organomegaly    Back: symmetric, no curvature. ROM normal. No CVA tenderness. Extremities: edema +    Neuro:  Grossly normal    CBC:   Recent Labs     03/03/23  0527 03/04/23  0534   WBC 6.1 6.6   HGB 11.1* 10.7*    275       BMP:    Recent Labs     03/03/23 0527 03/03/23  1907 03/04/23  0021 03/04/23  0534   * 134* 134* 136   K 4.1  --   --  3.9   CL 97*  --   --  100   CO2 23  --   --  27   BUN 47*  --   --  35*   CREATININE 4.85*  --   --  4.04*   GLUCOSE 137*  --   --  204*       Lab Results   Component Value Date/Time    NITRU NEGATIVE 03/02/2023 09:19 AM    COLORU Yellow 03/02/2023 09:19 AM    PHUR 5.0 03/02/2023 09:19 AM    WBCUA 0 TO 2 03/02/2023 09:19 AM    RBCUA 0 TO 2 03/02/2023 09:19 AM    MUCUS 1+ 03/27/2019 08:01 AM    TRICHOMONAS NOT REPORTED 03/27/2019 08:01 AM    YEAST NOT REPORTED 03/27/2019 08:01 AM    BACTERIA None 03/02/2023 09:19 AM    CLARITYU Clear 12/01/2020 12:00 AM    SPECGRAV 1.008 03/02/2023 09:19 AM    LEUKOCYTESUR NEGATIVE 03/02/2023 09:19 AM    UROBILINOGEN Normal 03/02/2023 09:19 AM    BILIRUBINUR NEGATIVE 03/02/2023 09:19 AM    BILIRUBINUR NEGATIVE 05/09/2012 11:18 AM    BLOODU Negative 12/01/2020 12:00 AM    GLUCOSEU NEGATIVE 03/02/2023 09:19 AM    GLUCOSEU 1+ 05/09/2012 11:18 AM    KETUA NEGATIVE 03/02/2023 09:19 AM    AMORPHOUS NOT REPORTED 03/27/2019 08:01 AM     Urine Sodium:     Lab Results   Component Value Date/Time    CALIXTO 53 03/01/2023 01:28 AM     Urine Chloride:    Lab Results   Component Value Date/Time    CLUR 23 02/24/2023 12:32 PM     Urine Osmolarity:   Lab Results   Component Value Date/Time    OSMOU 190 03/01/2023 01:28 AM     Urine Creatinine:     Lab Results   Component Value Date/Time    LABCREA 57.1 03/02/2023 12:11 AM     IMPRESSION/RECOMMENDATIONS:      1.   Acute kidney injury with chronic kidney disease stage IV - most consistent with Obstructive uropathy from urinary retention with top differential being progression of chronic kidney disease to stage V. Renal ultrasound today showed increase cortical renal echogenicity with no hydronephrosis. She is at increasing risk for uremic complications and I have recommended dialysis and patient is now agreeable. S/P Right IJ tunneled catheter placement 3/3/2023. Plan:   Continue indwelling Solis catheter. 2 nd Hemodialysis with ultrafiltration today   Monitor urine output closely. Basic metabolic profile daily. 2.  Hyponatremia - hypervolemic and hypo-osmolar as suggested by serum osmolality 268 mOsm/kg. This suggest fluid retention consequent to advanced chronic kidney disease and treatment will be dialysis. Fluid restriction 1.5 L/day    3. Systemic hypertension - BP control is adequate. 4.  Anion gap metabolic acidosis - secondary to uremia-improved with dialysis discontinue oral sodium bicarbonate     5. Constipation-Dulcolax suppository-voiding trial once bowel movement         follow-up for outpatient dialysis chair. No objections to discharge once she has a confirmed outpatient chair  Prognosis is guarded.     Ratna Patel M.D, [facility]  Nephrologist

## 2023-03-05 NOTE — PROGRESS NOTES
used to speak patient native Hungarian language (id number [id number]). Nurse updated patient on plan of care for day and medications. Nurse asked if patient had any questions or concerns she would like addressed today. Patient denies any and says she feels good today.

## 2023-03-06 LAB
ABSOLUTE EOS #: 0.4 K/UL (ref 0–0.4)
ABSOLUTE LYMPH #: 2.3 K/UL (ref 1–4.8)
ABSOLUTE MONO #: 0.9 K/UL (ref 0.1–1.3)
ANION GAP SERPL CALCULATED.3IONS-SCNC: 11 MMOL/L (ref 9–17)
BASOPHILS # BLD: 1 % (ref 0–2)
BASOPHILS ABSOLUTE: 0 K/UL (ref 0–0.2)
BUN SERPL-MCNC: 36 MG/DL (ref 8–23)
CALCIUM SERPL-MCNC: 8.4 MG/DL (ref 8.6–10.4)
CHLORIDE SERPL-SCNC: 98 MMOL/L (ref 98–107)
CO2 SERPL-SCNC: 27 MMOL/L (ref 20–31)
CREAT SERPL-MCNC: 4.15 MG/DL (ref 0.5–0.9)
EOSINOPHILS RELATIVE PERCENT: 6 % (ref 0–4)
GFR SERPL CREATININE-BSD FRML MDRD: 11 ML/MIN/1.73M2
GLUCOSE BLD-MCNC: 123 MG/DL (ref 65–105)
GLUCOSE BLD-MCNC: 129 MG/DL (ref 65–105)
GLUCOSE BLD-MCNC: 133 MG/DL (ref 65–105)
GLUCOSE BLD-MCNC: 157 MG/DL (ref 65–105)
GLUCOSE BLD-MCNC: 159 MG/DL (ref 65–105)
GLUCOSE SERPL-MCNC: 191 MG/DL (ref 70–99)
HCT VFR BLD AUTO: 32 % (ref 36–46)
HGB BLD-MCNC: 10.1 G/DL (ref 12–16)
LYMPHOCYTES # BLD: 35 % (ref 24–44)
MCH RBC QN AUTO: 26 PG (ref 26–34)
MCHC RBC AUTO-ENTMCNC: 31.6 G/DL (ref 31–37)
MCV RBC AUTO: 82.5 FL (ref 80–100)
MONOCYTES # BLD: 14 % (ref 1–7)
PDW BLD-RTO: 15.3 % (ref 11.5–14.9)
PLATELET # BLD AUTO: 272 K/UL (ref 150–450)
PMV BLD AUTO: 7.9 FL (ref 6–12)
POTASSIUM SERPL-SCNC: 4.2 MMOL/L (ref 3.7–5.3)
RBC # BLD: 3.88 M/UL (ref 4–5.2)
SEG NEUTROPHILS: 44 % (ref 36–66)
SEGMENTED NEUTROPHILS ABSOLUTE COUNT: 3 K/UL (ref 1.3–9.1)
SODIUM SERPL-SCNC: 136 MMOL/L (ref 135–144)
WBC # BLD AUTO: 6.7 K/UL (ref 3.5–11)

## 2023-03-06 PROCEDURE — 99232 SBSQ HOSP IP/OBS MODERATE 35: CPT | Performed by: INTERNAL MEDICINE

## 2023-03-06 PROCEDURE — 2500000003 HC RX 250 WO HCPCS: Performed by: INTERNAL MEDICINE

## 2023-03-06 PROCEDURE — 6370000000 HC RX 637 (ALT 250 FOR IP): Performed by: INTERNAL MEDICINE

## 2023-03-06 PROCEDURE — 85025 COMPLETE CBC W/AUTO DIFF WBC: CPT

## 2023-03-06 PROCEDURE — P9047 ALBUMIN (HUMAN), 25%, 50ML: HCPCS | Performed by: INTERNAL MEDICINE

## 2023-03-06 PROCEDURE — 2060000000 HC ICU INTERMEDIATE R&B

## 2023-03-06 PROCEDURE — 36415 COLL VENOUS BLD VENIPUNCTURE: CPT

## 2023-03-06 PROCEDURE — 2580000003 HC RX 258: Performed by: NURSE PRACTITIONER

## 2023-03-06 PROCEDURE — 6360000002 HC RX W HCPCS: Performed by: NURSE PRACTITIONER

## 2023-03-06 PROCEDURE — 90935 HEMODIALYSIS ONE EVALUATION: CPT

## 2023-03-06 PROCEDURE — 6370000000 HC RX 637 (ALT 250 FOR IP): Performed by: NURSE PRACTITIONER

## 2023-03-06 PROCEDURE — 6360000002 HC RX W HCPCS: Performed by: INTERNAL MEDICINE

## 2023-03-06 PROCEDURE — 82947 ASSAY GLUCOSE BLOOD QUANT: CPT

## 2023-03-06 PROCEDURE — 80048 BASIC METABOLIC PNL TOTAL CA: CPT

## 2023-03-06 RX ORDER — BISACODYL 10 MG
10 SUPPOSITORY, RECTAL RECTAL ONCE
Status: COMPLETED | OUTPATIENT
Start: 2023-03-06 | End: 2023-03-06

## 2023-03-06 RX ORDER — ALBUMIN (HUMAN) 12.5 G/50ML
25 SOLUTION INTRAVENOUS ONCE
Status: COMPLETED | OUTPATIENT
Start: 2023-03-06 | End: 2023-03-06

## 2023-03-06 RX ORDER — POLYETHYLENE GLYCOL 3350 17 G/17G
17 POWDER, FOR SOLUTION ORAL DAILY
Qty: 527 G | Refills: 1 | Status: SHIPPED | OUTPATIENT
Start: 2023-03-07 | End: 2023-03-07 | Stop reason: HOSPADM

## 2023-03-06 RX ORDER — CARVEDILOL 12.5 MG/1
12.5 TABLET ORAL 2 TIMES DAILY WITH MEALS
Qty: 60 TABLET | Refills: 3 | Status: SHIPPED | OUTPATIENT
Start: 2023-03-06 | End: 2023-03-07 | Stop reason: HOSPADM

## 2023-03-06 RX ORDER — INSULIN GLARGINE 100 [IU]/ML
20 INJECTION, SOLUTION SUBCUTANEOUS DAILY
Qty: 10 ML | Refills: 3 | Status: SHIPPED | OUTPATIENT
Start: 2023-03-07 | End: 2023-03-07 | Stop reason: HOSPADM

## 2023-03-06 RX ORDER — SODIUM BICARBONATE 650 MG/1
650 TABLET ORAL 3 TIMES DAILY
Qty: 90 TABLET | Refills: 0 | Status: SHIPPED | OUTPATIENT
Start: 2023-03-06

## 2023-03-06 RX ORDER — POLYETHYLENE GLYCOL 3350 17 G/17G
17 POWDER, FOR SOLUTION ORAL DAILY
Status: DISCONTINUED | OUTPATIENT
Start: 2023-03-06 | End: 2023-03-07 | Stop reason: HOSPADM

## 2023-03-06 RX ORDER — MIDODRINE HYDROCHLORIDE 5 MG/1
5 TABLET ORAL
Status: COMPLETED | OUTPATIENT
Start: 2023-03-06 | End: 2023-03-06

## 2023-03-06 RX ADMIN — INSULIN GLARGINE 20 UNITS: 100 INJECTION, SOLUTION SUBCUTANEOUS at 09:32

## 2023-03-06 RX ADMIN — SODIUM BICARBONATE 650 MG: 650 TABLET ORAL at 09:34

## 2023-03-06 RX ADMIN — SODIUM CHLORIDE, PRESERVATIVE FREE 10 ML: 5 INJECTION INTRAVENOUS at 09:42

## 2023-03-06 RX ADMIN — SODIUM CHLORIDE, PRESERVATIVE FREE 10 ML: 5 INJECTION INTRAVENOUS at 21:32

## 2023-03-06 RX ADMIN — Medication 1.6 ML: at 12:32

## 2023-03-06 RX ADMIN — PRAVASTATIN SODIUM 20 MG: 20 TABLET ORAL at 21:32

## 2023-03-06 RX ADMIN — HEPARIN SODIUM 5000 UNITS: 5000 INJECTION INTRAVENOUS; SUBCUTANEOUS at 14:16

## 2023-03-06 RX ADMIN — ASPIRIN 162 MG: 81 TABLET, COATED ORAL at 09:27

## 2023-03-06 RX ADMIN — SENNOSIDES AND DOCUSATE SODIUM 2 TABLET: 50; 8.6 TABLET ORAL at 09:35

## 2023-03-06 RX ADMIN — CARVEDILOL 12.5 MG: 12.5 TABLET, FILM COATED ORAL at 16:59

## 2023-03-06 RX ADMIN — HEPARIN SODIUM 5000 UNITS: 5000 INJECTION INTRAVENOUS; SUBCUTANEOUS at 05:45

## 2023-03-06 RX ADMIN — SODIUM BICARBONATE 650 MG: 650 TABLET ORAL at 21:32

## 2023-03-06 RX ADMIN — PANTOPRAZOLE SODIUM 40 MG: 40 TABLET, DELAYED RELEASE ORAL at 05:45

## 2023-03-06 RX ADMIN — MIDODRINE HYDROCHLORIDE 5 MG: 5 TABLET ORAL at 11:44

## 2023-03-06 RX ADMIN — HEPARIN SODIUM 5000 UNITS: 5000 INJECTION INTRAVENOUS; SUBCUTANEOUS at 21:32

## 2023-03-06 RX ADMIN — ALBUMIN (HUMAN) 25 G: 0.25 INJECTION, SOLUTION INTRAVENOUS at 11:42

## 2023-03-06 RX ADMIN — POLYETHYLENE GLYCOL 3350 17 G: 17 POWDER, FOR SOLUTION ORAL at 09:37

## 2023-03-06 RX ADMIN — BISACODYL 10 MG: 10 SUPPOSITORY RECTAL at 09:35

## 2023-03-06 RX ADMIN — SODIUM BICARBONATE 650 MG: 650 TABLET ORAL at 14:16

## 2023-03-06 NOTE — PLAN OF CARE
Problem: Pain  Goal: Verbalizes/displays adequate comfort level or baseline comfort level  3/6/2023 0410 by Misael Lyn RN  Outcome: Progressing  3/6/2023 0407 by Misael Lyn RN  Outcome: Progressing     Problem: Skin/Tissue Integrity  Goal: Absence of new skin breakdown  Description: 1. Monitor for areas of redness and/or skin breakdown  2. Assess vascular access sites hourly  3. Every 4-6 hours minimum:  Change oxygen saturation probe site  4. Every 4-6 hours:  If on nasal continuous positive airway pressure, respiratory therapy assess nares and determine need for appliance change or resting period.   3/6/2023 0410 by Misael Lyn RN  Outcome: Progressing  3/6/2023 0407 by Misael Lyn RN  Outcome: Progressing     Problem: ABCDS Injury Assessment  Goal: Absence of physical injury  3/6/2023 0410 by Misael Lyn RN  Outcome: Progressing  3/6/2023 0407 by Misael Lyn RN  Outcome: Progressing  Flowsheets (Taken 3/5/2023 2158)  Absence of Physical Injury: Implement safety measures based on patient assessment     Problem: Safety - Adult  Goal: Free from fall injury  3/6/2023 0410 by Misael Lyn RN  Outcome: Progressing  3/6/2023 0407 by Misael Lyn RN  Outcome: Progressing  Flowsheets (Taken 3/5/2023 2158)  Free From Fall Injury: Instruct family/caregiver on patient safety     Problem: Chronic Conditions and Co-morbidities  Goal: Patient's chronic conditions and co-morbidity symptoms are monitored and maintained or improved  3/6/2023 0410 by Misael Lyn RN  Outcome: Progressing  3/6/2023 0407 by Misael Lyn RN  Outcome: Progressing

## 2023-03-06 NOTE — PROGRESS NOTES
[facility] Internal Medicine  Ayad Breaux MD; Kevin Fernando MD; Apple Chew MD; MD Donna Miranda MD; Velta Bernheim, MD  [facility] Internal Medicine   [address] [facility]                 Date:   3/6/2023  Patientname:  Ty Bolton  Date of admission:  2/28/2023 11:22 PM  MRN:   [id number]  Account:  [de-identified]  YOB: 1960  PCP:    Rita Lucio MD  Room:   2081/2081-01  Code Status:    Full Code      Chief Complaint:     Chief Complaint   Patient presents with    Emesis    Other       History of Present Illness:     Arlene Bolton is a 60 y.o. Non- / non  female who presents with Emesis and is admitted to the hospital for the management of Acute kidney injury superimposed on chronic kidney disease ([facility] [address] Extensive medical history of CAD, CHF, CKD 4, DM and HTN. Patient and  only speaks Urdu, communicated with video . Patient is being followed closely by nephrology as outpatient due to stage IV CKD. She presented with nausea and vomiting. She presented with nausea and vomiting. Initial lab work shows CR 3.5 which is actually improved from CR 4.17 on 12/24/2023. Electrolyte imbalances noted with sodium 123, potassium 2.8, magnesium 1.5. Placement initiated in ED. Patient is also hypertensive, primary cardiologist Dr. Shonda Fernandes consulted and requested starting nifedipine. Troponin slightly elevated at 41 & 46. Patient denies chest pain or shortness of breath. Denies fever, chills, chest pain, cough, diarrhea, and urinary symptoms. There are no aggravating or alleviating factors. Symptoms are reported as acute, constant and moderate in severity.     Communication was done with the help of video  [id number]  Nausea is better  Vitals are stable  3/1  Patient feels that her face is swollen,  Felt like the face was puffy this morning, was hypoglycemic, vitals were stable. As per HPI rest of review of system negative. Physical exam  General : Patient alert awake in no acute distress  HEENT : Atraumatic, normocephalic , oral mucosa membrane moist,  Eyes : Extraocular movements intact  Neck : Supple, range of motion intact,  Cardiovascular : Regular rate and rhythm, no murmur appreciated  Respiratory : Bilateral clear breath sounds, no wheezing crackles appreciated  Gastrointestinal  : Abdomen soft, nontender, bowel sounds present  Extremities : No pedal edema clubbing or cyanosis present  Skin : Warm and dry, no skin lesions appreciated  Psych : Mood normal     Past Medical History:     Past Medical History:   Diagnosis Date    CAD (coronary artery disease)     CHF (congestive heart failure) ([facility])     CKD (chronic kidney disease) stage 3, GFR 30-59 ml/min ([facility])     Diabetes mellitus ([facility])     Hyperkalemia     Hypertension     Microscopic hematuria     Proteinuria     Renal failure     Status post coronary artery bypass grafting 2010    Type II or unspecified type diabetes mellitus without mention of complication, not stated as uncontrolled     UTI (urinary tract infection)         Past Surgical History:     Past Surgical History:   Procedure Laterality Date    CARDIAC SURGERY      CATARACT EXTRACTION EXTRACAPSULAR W/ INTRAOCULAR LENS IMPLANTATION Right 09/01/2022    [facility]/Complex with VisionBlue and iris stretching    CATARACT REMOVAL Right 9/1/2022    EYE CATARACT EMULSIFICATION IOL IMPLANT w/ VISION BLUE AND PERIBULBAR BLOCK performed by Mars Calles MD at [address] [place] [address]      IR TUNNELED CATHETER PLACEMENT GREATER THAN 5 YEARS  3/3/2023    IR TUNNELED CATHETER PLACEMENT GREATER THAN 5 YEARS 3/3/2023 [facility]        Medications Prior to Admission:     Prior to Admission medications    Medication Sig Start Date End Date Taking?  Authorizing Provider   omeprazole (PRILOSEC) 40 MG delayed release capsule TAKE 1 CAPSULE BY MOUTH ONCE DAILY FOR 90 DAYS 11/27/22  Yes Historical Provider, MD   BISACODYL 5 MG EC tablet USE AS DIRECTED 12/10/21  Yes Historical Provider, MD   magnesium oxide (MAG-OX) 400 (241.3 Mg) MG TABS tablet TAKE 1 TABLET BY MOUTH ONCE DAILY WITH FOOD FOR 90 DAYS 10/26/20  Yes Historical Provider, MD   Sennosides-Docusate Sodium 8.6-50 MG CAPS Take 2 tablets by mouth daily   Yes Historical Provider, MD Lizzie Melgar KWIKPEN 100 UNIT/ML injection pen Inject 42 Units into the skin daily 5/16/19  Yes Historical Provider, MD   sodium bicarbonate 650 MG tablet Take 650 mg by mouth daily 5/16/19  Yes Historical Provider, MD   ramipril (ALTACE) 2.5 MG capsule Take 1 capsule by mouth daily  Patient taking differently: Take 2.5 mg by mouth daily Hold 3/28/19  Yes Autumn Phillips MD   carvedilol (COREG) 6.25 MG tablet Take 1 tablet by mouth 2 times daily  Patient taking differently: Take 3.125 mg by mouth 2 times daily 3/27/19  Yes Autumn Phillips MD   pravastatin (PRAVACHOL) 20 MG tablet Take 20 mg by mouth nightly   Yes Historical Provider, MD   aspirin 81 MG EC tablet Take 162 mg by mouth daily    Yes Historical Provider, MD   potassium chloride (KLOR-CON M) 20 MEQ extended release tablet Take 2 tablets by mouth daily for 4 doses Stop Lokelma - take potassium Chloride 40mEq today and 40mEq tomorrow recheck potassium on Monday morning 2/24/23 2/28/23  Mary De Guzman MD   ferrous sulfate (IRON 325) 325 (65 Fe) MG tablet Take by mouth daily    Historical Provider, MD   vitamin D3 (CHOLECALCIFEROL) 125 MCG (5000 UT) TABS tablet 1,000 Units daily    Historical Provider, MD   TRUE METRIX BLOOD GLUCOSE TEST strip USE AS DIRECTED ONCE DAILY 10/26/20   Historical Provider, MD   TRUEplus Lancets 30G MISC USE TWICE DAILY 10/26/20   Historical Provider, MD   Blood Pressure Monitor KIT Take Bp daily 11/23/20   Mary De Guzman MD        Allergies:     Ace inhibitors    Social History: Tobacco:    reports that she has never smoked. She has never used smokeless tobacco.  Alcohol:      reports no history of alcohol use. Drug Use:  reports no history of drug use. Family History:     Family History   Problem Relation Age of Onset    Heart Attack Mother     Diabetes Mother     Diabetes Father        Investigations:      Laboratory Testing:  Recent Results (from the past 24 hour(s))   POC Glucose Fingerstick    Collection Time: 03/05/23  8:17 AM   Result Value Ref Range    POC Glucose 151 (H) 65 - 105 mg/dL   POC Glucose Fingerstick    Collection Time: 03/05/23 11:01 AM   Result Value Ref Range    POC Glucose 149 (H) 65 - 105 mg/dL   POC Glucose Fingerstick    Collection Time: 03/05/23  4:22 PM   Result Value Ref Range    POC Glucose 159 (H) 65 - 105 mg/dL   POC Glucose Fingerstick    Collection Time: 03/05/23  8:56 PM   Result Value Ref Range    POC Glucose 148 (H) 65 - 105 mg/dL   POC Glucose Fingerstick    Collection Time: 03/06/23  6:09 AM   Result Value Ref Range    POC Glucose 129 (H) 65 - 105 mg/dL       Imaging/Diagnostics:  No results found.       Plan:     Patient status inpatient in the Progressive Unit/Step down    OREN superimposed on CKD4  -Creatinine cr 3.5; Baseline 2.0  -history of CKD  -Nephrology consulted in ED  -NS infusion  -hold diuretics/nephrotoxic medications  -monitor BMP    Hypokalemia  -K+ replaced with 1x dose  -recheck BMP in am    Hypomagnesemia  -Mag level - 1.5  - 1 gm IV replacement ordered  -monitor magnesium level daily    Hyponatremia  -Sodium level -123  -check sodium level every 4 hours for now  -IV NS at 75ml/hr per nephrology    HTN  -cardiology consulted in ED  -started on Nifedipine, hydralazine prn  -Continue home BP meds  -Monitor VS    Nausea/vomiting  -compazine prn  -consider abdominal imaging if unresolved    Nausea vomiting is better now, will give 40 p.o. potassium as well, potassium was 2.8 this morning,  If no improvement in the nausea and vomiting, by today evening will do imaging,  Sodium worse at 121, will inform nephrology,  Seen by cardiology no intervention at this point  Creatinine 3.49.      3/\2  Patient was found to be retaining 800 mL urine last night, ultrasound kidneys pending, discussed with Dr. Bettie Gibbs recommended to put order for IR tunneled catheter for dialysis  Continues to be hyponatremic with sodium of 123  Patient was hypoglycemic this morning with blood sugar dropped to 50, getting 42 units of Lantus at home, will decrease it to 20 units, potassium replaced, urine culture is negative. AST was high, patient hepatitis C positive, get ultrasound liver, patient to follow-up with GI as outpatient. 3/3  [facility] draining urine  Patient received first session of dialysis today  Sugars controlled  Outpatient dialysis slot awaited    3/4  Receiving dialysis second session with ultrafiltration today  No new issues  Urine output good  Sodium normalized  Awaiting outpatient dialysis slot  Complaining of constipation today--we will add Colace, abdomen soft, bowel sounds active on exam    3/5  Blood pressure running high today-increasing dose of Coreg  No acute issues overnight, awaiting outpatient dialysis    3/6  Patient reports no BM for last couple days-I was able to communicate with her in [facility]  Will intensify bowel regimen, Dulcolax suppository today  Void trial once she has BM  Discharge once outpatient dialysis slot obtained    Rayburn Canavan, MD  3/6/2023  7:28 AM      Please note that this chart was generated using voice recognition Dragon dictation software. Although every effort was made to ensure the accuracy of this automated transcription, some errors in transcription may have occurred. Rylee Roberts [address] [place] [address], [address] [place] [address].    Phone [phone]

## 2023-03-06 NOTE — PLAN OF CARE
Problem: Pain  Goal: Verbalizes/displays adequate comfort level or baseline comfort level  Outcome: Progressing     Problem: Skin/Tissue Integrity  Goal: Absence of new skin breakdown  Description: 1. Monitor for areas of redness and/or skin breakdown  2. Assess vascular access sites hourly  3. Every 4-6 hours minimum:  Change oxygen saturation probe site  4. Every 4-6 hours:  If on nasal continuous positive airway pressure, respiratory therapy assess nares and determine need for appliance change or resting period.   Outcome: Progressing     Problem: ABCDS Injury Assessment  Goal: Absence of physical injury  Outcome: Progressing  Flowsheets (Taken 3/5/2023 2158)  Absence of Physical Injury: Implement safety measures based on patient assessment     Problem: Safety - Adult  Goal: Free from fall injury  Outcome: Progressing  Flowsheets (Taken 3/5/2023 2158)  Free From Fall Injury: Instruct family/caregiver on patient safety     Problem: Chronic Conditions and Co-morbidities  Goal: Patient's chronic conditions and co-morbidity symptoms are monitored and maintained or improved  Outcome: Progressing

## 2023-03-06 NOTE — PROGRESS NOTES
HEMODIALYSIS PRE-TREATMENT NOTE    Patient Identifiers prior to treatment: Name//MRN    Isolation Required:  Yes                      Isolation Type: N/A       (please document if patient is being managed as a PUI/COVID-19 patient)        Hepatitis status:                           Date Drawn                             Result  Hepatitis B Surface Antigen 2023     NEG                     Hepatitis B Surface Antibody 2023 NEG        Hepatitis B Core Antibody N/A N/A          How was Hepatitis Status verified: Epic chart     Was a copy of the labs you documented provided to facility for the patient's chart: Yes    Hemodialysis orders verified: Yes by Alexsandra Bolanos RN    Access Within normal limits ( I.e. s/s of infection,...): WNL     Pre-Assessment completed: Yes by Alexsandra Bolanos RN    Pre-dialysis report received from: Brenda                      Time: 09:40

## 2023-03-06 NOTE — FLOWSHEET NOTE
Dr Emiliano Leija perfect serve: unable to ambulate pt, pt c/o dizziness. can we cancel dc? Dr Buffy Lpoez states since she is producing urine, we can start her tx thursday, not tuesday. can we get PT onboard?

## 2023-03-06 NOTE — PROGRESS NOTES
Department of Internal Medicine  Nephrology Edwin Ruth MD Progress Note    Reason for consultation: Management of acute kidney injury and chronic kidney disease stage IV. Consulting physician: Nicolas Witt MD.  Encounter was in the presence of RN as well as with the assistance of a video . Interval history: Patient was seen and examined today at dialysis. She had a right IJ tunneled catheter placed  She complains of constipation. Patient has a [facility] catheter in place due to postvoid residual of 800 cc . Patient had a urine output of 1.2 L yesterday  Renal ultrasound showed no hydronephrosis increased echogenicity of the bilateral renal cortex. History of present illness: This is a 60 y.o. 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 I saw patient in the office last week Friday 2/24/23 and recommended dialysis but she refused at this time as she was planning to travel to [facility] to visit family in middle of March and she will be gone for 5 months. She presented to the emergency department last night with 2 episodes of nausea and vomiting associated with uncontrolled hypertension. Vital signs at presentation were remarkable for blood pressure 218/89 mmHg. Studies at presentation [2/28/2023] was remarkable for BUNs/creatinine 35/3.50 mg/dL; Sodium 123 mmol/L and potassium 2.8 mmol/L. Today, blood pressure control is better and patient has nausea but no vomiting. Appetite is decreased. She denies shortness of breath.     Scheduled Meds:   polyethylene glycol  17 g Oral Daily    carvedilol  12.5 mg Oral BID WC    sennosides-docusate sodium  2 tablet Oral Daily insulin glargine  20 Units SubCUTAneous Daily    sodium chloride flush  5-40 mL IntraVENous 2 times per day    heparin (porcine)  5,000 Units SubCUTAneous 3 times per day    aspirin  162 mg Oral Daily    insulin lispro  0-8 Units SubCUTAneous TID WC    insulin lispro  0-4 Units SubCUTAneous Nightly    ferrous sulfate  325 mg Oral Daily with breakfast    pantoprazole  40 mg Oral QAM AC    pravastatin  20 mg Oral Nightly    sodium bicarbonate  650 mg Oral TID     Continuous Infusions:   sodium chloride      dextrose       PRN Meds:.anticoagulant sodium citrate, anticoagulant sodium citrate, sodium chloride flush, sodium chloride, acetaminophen **OR** acetaminophen, glucose, dextrose bolus **OR** dextrose bolus, glucagon (rDNA), dextrose, bisacodyl, prochlorperazine, hydrALAZINE    Physical Exam:    VITALS:  /60   Pulse 88   Temp 99 °F (37.2 °C) (Oral)   Resp 17   Ht 5' (1.524 m)   Wt 128 lb 1.4 oz (58.1 kg)   SpO2 95%   BMI 25.02 kg/m²   TEMPERATURE:  Current - Temp: 99 °F (37.2 °C);  Max - Temp  Av.8 °F (37.1 °C)  Min: 98.5 °F (36.9 °C)  Max: 99.3 °F (37.4 °C)  RESPIRATIONS RANGE: Resp  Av.8  Min: 17  Max: 18  PULSE RANGE: Pulse  Av.9  Min: 63  Max: 90  BLOOD PRESSURE RANGE:  Systolic (77CRG), WRI:643 , Min:83 , HNI:968 ; Diastolic (30QBC), VZS:00, Min:50, Max:96  PULSE OXIMETRY RANGE: SpO2  Av.8 %  Min: 94 %  Max: 95 %  24HR INTAKE/OUTPUT:    Intake/Output Summary (Last 24 hours) at 3/6/2023 1442  Last data filed at 3/6/2023 1420  Gross per 24 hour   Intake 2170 ml   Output 3025 ml   Net -855 ml       Constitutional: alert, appears stated age, and cooperative    Skin: Skin color, texture, turgor normal. No rashes or lesions    Head: Normocephalic, without obvious abnormality, atraumatic     Cardiovascular/Edema: regular rate and rhythm, S1, S2 normal, no murmur, click, rub or gallop    Respiratory: Lungs: clear to auscultation bilaterally    Abdomen: soft, non-tender; bowel sounds normal; no masses,  no organomegaly    Back: symmetric, no curvature. ROM normal. No CVA tenderness. Extremities: edema +    Neuro:  Grossly normal    CBC:   Recent Labs     03/04/23  0534 03/06/23  0841   WBC 6.6 6.7   HGB 10.7* 10.1*    272       BMP:    Recent Labs     03/04/23  0021 03/04/23  0534 03/06/23  0841   * 136 136   K  --  3.9 4.2   CL  --  100 98   CO2  --  27 27   BUN  --  35* 36*   CREATININE  --  4.04* 4.15*   GLUCOSE  --  204* 191*       Lab Results   Component Value Date/Time    NITRU NEGATIVE 03/02/2023 09:19 AM    COLORU Yellow 03/02/2023 09:19 AM    PHUR 5.0 03/02/2023 09:19 AM    WBCUA 0 TO 2 03/02/2023 09:19 AM    RBCUA 0 TO 2 03/02/2023 09:19 AM    MUCUS 1+ 03/27/2019 08:01 AM    TRICHOMONAS NOT REPORTED 03/27/2019 08:01 AM    YEAST NOT REPORTED 03/27/2019 08:01 AM    BACTERIA None 03/02/2023 09:19 AM    CLARITYU Clear 12/01/2020 12:00 AM    SPECGRAV 1.008 03/02/2023 09:19 AM    LEUKOCYTESUR NEGATIVE 03/02/2023 09:19 AM    UROBILINOGEN Normal 03/02/2023 09:19 AM    BILIRUBINUR NEGATIVE 03/02/2023 09:19 AM    BILIRUBINUR NEGATIVE 05/09/2012 11:18 AM    BLOODU Negative 12/01/2020 12:00 AM    GLUCOSEU NEGATIVE 03/02/2023 09:19 AM    GLUCOSEU 1+ 05/09/2012 11:18 AM    KETUA NEGATIVE 03/02/2023 09:19 AM    AMORPHOUS NOT REPORTED 03/27/2019 08:01 AM     Urine Sodium:     Lab Results   Component Value Date/Time    CALIXTO 53 03/01/2023 01:28 AM     Urine Chloride:    Lab Results   Component Value Date/Time    CLUR 23 02/24/2023 12:32 PM     Urine Osmolarity:   Lab Results   Component Value Date/Time    OSMOU 190 03/01/2023 01:28 AM     Urine Creatinine:     Lab Results   Component Value Date/Time    LABCREA 57.1 03/02/2023 12:11 AM     IMPRESSION/RECOMMENDATIONS:      1. Acute kidney injury with chronic kidney disease stage IV - most consistent with Obstructive uropathy from urinary retention with top differential being progression of chronic kidney disease to stage V.   Renal ultrasound showed increase cortical renal echogenicity with no hydronephrosis. S/P Right IJ tunneled catheter placement 3/3/2023 and acute dialysis initiated 3/3/2023    Plan:   Continue indwelling Solis catheter. Attempt 2 kg of UF today  Monitor urine output closely. Basic metabolic profile daily. 2.  Hyponatremia - hypervolemic and hypo-osmolar as suggested by serum osmolality 268 mOsm/kg. This suggest fluid retention consequent to advanced chronic kidney disease-improved with dialysis. Fluid restriction 1.5 L/day    3. Systemic hypertension - BP control is adequate. 4.  Anion gap metabolic acidosis - secondary to uremia-improved with dialysis    5. Constipation-Dulcolax suppository-voiding trial once bowel movement         follow-up for outpatient dialysis chair. No objections to discharge once she has a confirmed outpatient chair    Prognosis is guarded.     Alirio Pavon M.D, ANA  Nephrologist

## 2023-03-06 NOTE — PROGRESS NOTES
Dr Francesca Gr aware of HD chair at [address], just waiting on void trial. Solis d/c'd at 215pm and DTV at 21 . Can dc pt tonight and will cancel dc order if unable to void and dc tomorrow morning.

## 2023-03-06 NOTE — DISCHARGE INSTR - COC
Continuity of Care Form    Patient Name: Uzma Harrington   :  1960  MRN:  [id number]    Admit date:  2023  Discharge date:  ***    Code Status Order: Full Code   Advance Directives:     Admitting Physician:  Penny Victoria MD  PCP: Christian Cervantes MD    Discharging Nurse: [facility] Unit/Room#:   Discharging Unit Phone Number: ***    Emergency Contact:   Extended Emergency Contact Information  Primary Emergency Contact: Rafael Tapia  Address: [place] [address] [address] [facility]  Home Phone: [phone]  Relation: Spouse  Secondary Emergency Contact: [facility]  Mobile Phone: [phone]  Relation: Other  Preferred language: Bulgarian   needed?  Yes    Past Surgical History:  Past Surgical History:   Procedure Laterality Date    CARDIAC SURGERY      CATARACT EXTRACTION EXTRACAPSULAR W/ INTRAOCULAR LENS IMPLANTATION Right 2022    [facility]/Complex with VisionBlue and iris stretching    CATARACT REMOVAL Right 2022    EYE CATARACT EMULSIFICATION IOL IMPLANT w/ VISION BLUE AND PERIBULBAR BLOCK performed by Tristan Latham MD at [address] [place] [address]      IR TUNNELED CATHETER PLACEMENT GREATER THAN 5 YEARS  3/3/2023    IR TUNNELED CATHETER PLACEMENT GREATER THAN 5 YEARS 3/3/2023 [facility] SPECIAL PROCEDURES       Immunization History:   Immunization History   Administered Date(s) Administered    COVID-19, PFIZER PURPLE top, DILUTE for use, (age 15 y+), 30mcg/0.3mL 2021, 2021, 2021    Influenza Virus Vaccine 2012, 2014, 10/30/2015, 10/23/2017    Influenza, FLUCELVAX, (age 10 mo+), MDCK, PF, 0.5mL 10/22/2018, 10/20/2020    Pneumococcal Polysaccharide ([id number]) 2012       Active Problems:  Patient Active Problem List   Diagnosis Code    Atypical chest pain R07.89    DM (diabetes mellitus) (HCC) E11.9    CKD (chronic kidney disease) stage 4, GFR 15-29 ml/min ([facility] [address] N18.4    Angina pectoris ([facility]) I20.9    CAD, S/P CABGx3 2010 I25.10    Hypertension I10    Dyslipidemia E78.5    Hyperkalemia E87.5    Proteinuria R80.9    CHF (congestive heart failure) ([facility]) I50.9    Hyperglycemia R73.9    Abnormal EKG R94.31    Arteriosclerosis of arterial coronary artery bypass graft I25.810    Disorder resulting from impaired renal tubular function, unspecified N25.9    Hyperlipidemia E78.5    Ischemic cardiomyopathy I25.5    Rash and other nonspecific skin eruption R21    Shortness of breath R06.02    Acute kidney injury superimposed on chronic kidney disease (HCC) N17.9, N18.9       Isolation/Infection:   Isolation            No Isolation          Patient Infection Status       None to display            Nurse Assessment:  Last Vital Signs: BP (!) 163/74   Pulse 81   Temp 98.7 °F (37.1 °C) (Oral)   Resp 18   Ht 5' (1.524 m)   Wt 126 lb 8.7 oz (57.4 kg)   SpO2 95%   BMI 24.71 kg/m²     Last documented pain score (0-10 scale): Pain Level: 0  Last Weight:   Wt Readings from Last 1 Encounters:   03/06/23 126 lb 8.7 oz (57.4 kg)     Mental Status:  {IP PT MENTAL STATUS:20030}    IV Access:  { LEDY IV ACCESS:561896299}    Nursing Mobility/ADLs:  Walking   {CHP DME VZWB:024501981}  Transfer  {CHP DME SARH:299416848}  Bathing  {CHP DME JWMC:862289789}  Dressing  {CHP DME BXZS:617064333}  Toileting  {CHP DME GHOD:817457978}  Feeding  {CHP DME ZVWA:074089945}  Med Admin  {CHP DME AUVE:903208293}  Med Delivery   { LEDY MED Delivery:101227040}    Wound Care Documentation and Therapy:  Incision 09/01/22 Eye Right (Active)   Number of days: 185        Elimination:  Continence:    Bowel: {YES / XC:91106}  Bladder: {YES / MN:02992}  Urinary Catheter: {Urinary Catheter:048478333}   Colostomy/Ileostomy/Ileal Conduit: {YES / JZ:67419}       Date of Last BM: ***    Intake/Output Summary (Last 24 hours) at 3/6/2023 0737  Last data filed at 3/6/2023 0543  Gross per 24 hour   Intake 1210 ml   Output 1325 ml Net -115 ml     I/O last 3 completed shifts:   In: 5035 [P.O.:1440; I.V.:20]  Out: 1775 [Urine:1775]    Safety Concerns:     [address] Safety Concerns:361204901}    Impairments/Disabilities:      [address] Impairments/Disabilities:592164453}    Nutrition Therapy:  Current Nutrition Therapy:   { LEDY Diet List:968035412}    Routes of Feeding: {[facility] DME Other Feedings:008213204}  Liquids: {Slp liquid thickness:78391}  Daily Fluid Restriction: {P DME Yes amt example:222250988}  Last Modified Barium Swallow with Video (Video Swallowing Test): {Done Not Done GBWX:850651052}    Treatments at the Time of Hospital Discharge:   Respiratory Treatments: ***  Oxygen Therapy:  {Therapy; copd oxygen:23530}  Ventilator:    { CC Vent TZSW:157720966}    Rehab Therapies: {THERAPEUTIC INTERVENTION:0015739940}  Weight Bearing Status/Restrictions: {[facility] Weight Bearin}  Other Medical Equipment (for information only, NOT a DME order):  {EQUIPMENT:635576130}  Other Treatments: ***    Patient's personal belongings (please select all that are sent with patient):  {[facility] DME Belongings:466547884}    RN SIGNATURE:  {Esignature:319291117}    CASE MANAGEMENT/SOCIAL WORK SECTION    Inpatient Status Date: ***    Readmission Risk Assessment Score:  Readmission Risk              Risk of Unplanned Readmission:  25           Discharging to Facility/ Agency   Name:   Address:  Phone:  Fax:    Dialysis Facility (if applicable)   Name:  Address:  Dialysis Schedule:  Phone:  Fax:    / signature: {Esignature:344061634}    PHYSICIAN SECTION    Prognosis: {Prognosis:5420723823}    Condition at Discharge: [address] Patient Condition:790782653}    Rehab Potential (if transferring to Rehab): {Prognosis:7877258334}    Recommended Labs or Other Treatments After Discharge: ***    Physician Certification: I certify the above information and transfer of Arlene Briones  is necessary for the continuing treatment of the diagnosis listed and that she requires {Admit to Appropriate Level of Care:01795} for {GREATER/LESS:890228351} 30 days.      Update Admission H&P: {CHP DME Changes in RDGRI:851603921}    PHYSICIAN SIGNATURE:  Electronically signed by Rayburn Canavan, MD on 3/6/23 at 7:37 AM EST

## 2023-03-06 NOTE — CARE COORDINATION
SW spoke with [address] about if patient has transportation services. Insurance company verified that patient has insurance benefits to cover transportation. Patient will have to call [phone] to arrange transportation. Patient has a confirmed chair time of Tuesday, Thursday, and Saturday at 2:45pm. Lilibeth Campo expects the patient to start dialysis 3/7. JANETH spoke with bedside nurse about the above information. JANETH requested that nurse reach out to nephrology to make sure patient can receive dialysis tomorrow 3/7. SW waiting on a response. Electronically signed by MIKEY Torres on 3/6/2023 at 3:48 PM     Utilizing the interpretation service JANETH spoke to patient and  about discharge plans. JANETH arranged transportation with insurance company to transport patient to dialysis. Insurance will transport her Tuesday, Thursday, and Saturday @2:45pm. Fariha is scheduled to pick her up on Thursday 3/9 @1:45pm  services were present the duration of the conversation with insurance company. All questions were answered. Address for hotel room was provided for transport to  patient. SW informed transport that patients primary language is Wolof, and she will need someone to help communicate her needs. Transport reported that they will     Electronically signed by MIKEY Torres on 3/6/2023 at 4:42 PM     Provide patient and  with the address for [address] [place] [address] Dialysis location.     Electronically signed by MIKEY Torres on 3/6/2023 at 4:53 PM

## 2023-03-06 NOTE — PROGRESS NOTES
HEMODIALYSIS POST TREATMENT NOTE    Treatment time ordered: 2.5Hrs    Actual treatment time: 2.5Hrs    UltraFiltration Goal: 1Kg AT  UltraFiltration Removed: 900MLs      Pre Treatment weight: 59Kgs  Post Treatment weight: 58.1Kgs  Estimated Dry Weight: Unknown at this time    Access used:     Central Venous Catheter:          Tunneled or Non-tunneled: Tunneled           Site: R Chest          Access Flow: Good      Internal Access:       AV Fistula or AV Graft: N/A         Site: N/A       Access Flow: N/A       Sign and symptoms of infection: No       If YES: N/A    Medications or blood products given: Albumin 25Grams & Midodrine 5Mg given by Jani Braswell RN    Chronic outpatient schedule: Uknown at this time    Chronic outpatient unit: Unknown at this time    Summary of response to treatment: Tolerated fairly well with hypotension during last hr of trx and UF was turned off for 15mins while Albumin 25Grams & Midodrine 5Mg was given by Jani Braswell RN. BP beccame stable post medication and UF was turned back on. Patient tolerated 900Mls fluid removal and CVC clamped and capped. Explain if orders NOT met, was physician notified:N/A      ACES flowsheet faxed to patient unit/ placed in patient chart: Yes    Post assessment completed: Yes by Jani Braswell RN    Report given to: Samir Centeno RN      * Intra-treatment documented Safety Checks include the followin) Access and face visible at all times. 2) All connections and blood lines are secure with no kinks. 3) NVL alarm engaged. 4) Hemosafe device applied (if applicable). 5) No collapse of Arterial or Venous blood chambers. 6) All blood lines / pump segments in the air detectors.

## 2023-03-06 NOTE — PROGRESS NOTES
Sai Almonte aware pt and  needs help with transportation to outpatient HD once chair reserved. Osmar Thomas will tell pt to call [facility] for assistance with that, aware of language barrier and  device at bedside. Pt speaks Yi, from [place].

## 2023-03-06 NOTE — PROGRESS NOTES
used for all interactions today. Pt refusing to walk in hallway, stating she's dizzy. Writer explained dc process depends on it. Pt refuses politely. Writer explains dc process tomorrow morning and that need to get to HD treatment chair at [facility] by 245pm. Pt states  unable to take pt to HD chair tomorrow. Aware transport set up for Thursday but not Tuesday. Pt requesting to start hd on Thursday instead or get dialysis tx here til Thursday dt transportation. Nephrology has not returned page to clarify transition of outpatient hd schedule.

## 2023-03-06 NOTE — CARE COORDINATION
Faxed completed dialysis forms to [facility] requesting outpatient slot.     Electronically signed by MIKEY Palacio on 3/6/2023 at 8:16 AM

## 2023-03-07 VITALS
OXYGEN SATURATION: 94 % | HEART RATE: 77 BPM | BODY MASS INDEX: 24.54 KG/M2 | DIASTOLIC BLOOD PRESSURE: 79 MMHG | WEIGHT: 125 LBS | TEMPERATURE: 98 F | RESPIRATION RATE: 18 BRPM | HEIGHT: 60 IN | SYSTOLIC BLOOD PRESSURE: 181 MMHG

## 2023-03-07 LAB
GLUCOSE BLD-MCNC: 114 MG/DL (ref 65–105)
GLUCOSE BLD-MCNC: 171 MG/DL (ref 65–105)
HCV RNA SERPL NAA+PROBE-ACNC: ABNORMAL IU/ML
HCV RNA SERPL NAA+PROBE-LOG IU: 6.52 LOG IU/ML
HCV RNA SERPL QL NAA+PROBE: DETECTED
NEUTROPHIL AB, FLOW: NEGATIVE
SOURCE: ABNORMAL

## 2023-03-07 PROCEDURE — 99233 SBSQ HOSP IP/OBS HIGH 50: CPT | Performed by: INTERNAL MEDICINE

## 2023-03-07 PROCEDURE — 97530 THERAPEUTIC ACTIVITIES: CPT

## 2023-03-07 PROCEDURE — 51798 US URINE CAPACITY MEASURE: CPT

## 2023-03-07 PROCEDURE — 6370000000 HC RX 637 (ALT 250 FOR IP): Performed by: NURSE PRACTITIONER

## 2023-03-07 PROCEDURE — 2580000003 HC RX 258: Performed by: NURSE PRACTITIONER

## 2023-03-07 PROCEDURE — 6370000000 HC RX 637 (ALT 250 FOR IP): Performed by: INTERNAL MEDICINE

## 2023-03-07 PROCEDURE — 6360000002 HC RX W HCPCS: Performed by: NURSE PRACTITIONER

## 2023-03-07 PROCEDURE — 97161 PT EVAL LOW COMPLEX 20 MIN: CPT

## 2023-03-07 PROCEDURE — 82947 ASSAY GLUCOSE BLOOD QUANT: CPT

## 2023-03-07 PROCEDURE — 97166 OT EVAL MOD COMPLEX 45 MIN: CPT

## 2023-03-07 RX ORDER — INSULIN GLARGINE 100 [IU]/ML
20 INJECTION, SOLUTION SUBCUTANEOUS DAILY
Qty: 5 ADJUSTABLE DOSE PRE-FILLED PEN SYRINGE | Refills: 3 | Status: SHIPPED | OUTPATIENT
Start: 2023-03-07

## 2023-03-07 RX ORDER — CARVEDILOL 3.12 MG/1
3.12 TABLET ORAL 2 TIMES DAILY WITH MEALS
Qty: 60 TABLET | Refills: 3 | Status: SHIPPED | OUTPATIENT
Start: 2023-03-07

## 2023-03-07 RX ORDER — CARVEDILOL 3.12 MG/1
3.12 TABLET ORAL 2 TIMES DAILY WITH MEALS
Status: DISCONTINUED | OUTPATIENT
Start: 2023-03-07 | End: 2023-03-07 | Stop reason: HOSPADM

## 2023-03-07 RX ADMIN — HEPARIN SODIUM 5000 UNITS: 5000 INJECTION INTRAVENOUS; SUBCUTANEOUS at 06:15

## 2023-03-07 RX ADMIN — ASPIRIN 162 MG: 81 TABLET, COATED ORAL at 10:00

## 2023-03-07 RX ADMIN — POLYETHYLENE GLYCOL 3350 17 G: 17 POWDER, FOR SOLUTION ORAL at 10:00

## 2023-03-07 RX ADMIN — PANTOPRAZOLE SODIUM 40 MG: 40 TABLET, DELAYED RELEASE ORAL at 06:15

## 2023-03-07 RX ADMIN — SODIUM BICARBONATE 650 MG: 650 TABLET ORAL at 10:00

## 2023-03-07 RX ADMIN — FERROUS SULFATE TAB 325 MG (65 MG ELEMENTAL FE) 325 MG: 325 (65 FE) TAB at 10:00

## 2023-03-07 RX ADMIN — CARVEDILOL 12.5 MG: 12.5 TABLET, FILM COATED ORAL at 10:00

## 2023-03-07 RX ADMIN — INSULIN GLARGINE 20 UNITS: 100 INJECTION, SOLUTION SUBCUTANEOUS at 10:02

## 2023-03-07 RX ADMIN — SODIUM CHLORIDE, PRESERVATIVE FREE 10 ML: 5 INJECTION INTRAVENOUS at 10:02

## 2023-03-07 RX ADMIN — SENNOSIDES AND DOCUSATE SODIUM 2 TABLET: 50; 8.6 TABLET ORAL at 10:00

## 2023-03-07 NOTE — PROGRESS NOTES
Attending and Charge RN spoke with patient regarding positive Hepatitis C result and care planning. Patient and  already aware, as  also follows with a GI physician for a similar diagnosis. Multiple attempts made to communicate newest lab result to Nephrology prior to discharge; Patient eager to discharge home. Patient wheeled out via wheelchair with staff to front entrance and discharging home with . Electronically signed by Danay Puckett RN.

## 2023-03-07 NOTE — PROGRESS NOTES
Physical Therapy  Facility/Department: [facility] PROGRESSIVE CARE  Physical Therapy Initial Assessment    Name: Jessica Guan  : 1960  MRN: [id number]  Date of Service: 3/7/2023    Discharge Recommendations:      PT Equipment Recommendations  Equipment Needed: No      Patient Diagnosis(es): The primary encounter diagnosis was Hyponatremia. Diagnoses of Hypokalemia, Uremia, and Stage 5 chronic kidney disease not on chronic dialysis [facility]) were also pertinent to this visit. Past Medical History:  has a past medical history of CAD (coronary artery disease), CHF (congestive heart failure) ([facility] [address] [address] CKD (chronic kidney disease) stage 3, GFR 30-59 ml/min ([facility] [address] Diabetes mellitus ([facility] [address] Hyperkalemia, Hypertension, Microscopic hematuria, Proteinuria, Renal failure, Status post coronary artery bypass grafting, Type II or unspecified type diabetes mellitus without mention of complication, not stated as uncontrolled, and UTI (urinary tract infection). Past Surgical History:  has a past surgical history that includes Cholecystectomy; Cardiac surgery; Coronary artery bypass graft; Cataract extraction, extracapsular w/ intraocular lens implant (Right, 2022); Cataract removal (Right, 2022); and IR TUNNELED CVC PLACE WO SQ PORT/PUMP > 5 YEARS (3/3/2023). Assessment   Body Structures, Functions, Activity Limitations Requiring Skilled Therapeutic Intervention: Decreased functional mobility ; Decreased balance  Assessment: Impaired mobility due to safety issues of decreased balance and dizziness  Decision Making: Low Complexity  History: Kidney Injury  Exam: decreased balance, mobility  Clinical Presentation: evolving  Requires PT Follow-Up: Yes  Activity Tolerance  Activity Tolerance: Patient tolerated evaluation without incident  Activity Tolerance Comments: pt had a drop in systolic blood pressure from sitting to standing of 173 to 107     Plan   Physcial Therapy Plan  General Plan: 5-7 times per week  Current Treatment Recommendations: Stair training, Gait training, Functional mobility training, Therapeutic activities, Safety education & training, Balance training  Safety Devices  Type of Devices: Patient at risk for falls, Left in chair, Call light within reach, Nurse notified ( present)     Restrictions  Restrictions/Precautions  Restrictions/Precautions: Fall Risk  Required Braces or Orthoses?: No     Subjective   Pain: Patient denies pain.   General  Family / Caregiver Present: Yes  Follows Commands: Within Functional Limits  General Comment  Comments:  # [id number] used during this session  Subjective  Subjective: pt pleasant and cooperative         Social/Functional History  Social/Functional History  Lives With: Spouse  Type of Home:  ([address] [place] [address], [address])  Home Layout: One level  Home Access: Stairs to enter without rails  Entrance Stairs - Number of Steps: 1  Bathroom Shower/Tub: Tub/Shower unit  Bathroom Toilet: Standard  Bathroom Equipment: Grab bars in [address]:  (no DME)  Has the patient had two or more falls in the past year or any fall with injury in the past year?:  (reports 1 fall \"I fell head first\" - reports she became dizzy and fell)  ADL Assistance: [address]: Independent  Homemaking Responsibilities: Yes  Ambulation Assistance: Independent  Transfer Assistance: Independent  Active : No  Patient's  Info: Friends  Mode of Transportation: [place]y [place]  Occupation: On disability       Objective   O2 Device: None (Room air)     AROM RLE (degrees)  RLE AROM: WNL  AROM LLE (degrees)  LLE AROM : WNL  Strength RLE  Comment: at least 3+/5- some difficulty with directions for MMT due to language barrier  Strength LLE  Comment: at least 3+/5- some difficulty with directions for MMT due to language barrier       Bed mobility  Supine to Sit: Minimal assistance  Sit to Supine:  (pt left up in chair)  Scooting: Contact guard assistance  Bed Mobility Comments: bed flat without rails.  pt reported dizziness with sitting up  Transfers  Sit to Stand: Contact guard assistance  Stand to Sit: Contact guard assistance  Bed to Chair: Minimal assistance  Comment: pt reported slight dizziness with standing  Ambulation  Device: No Device  Assistance: Minimal assistance  Distance: 15ft  Comments: slightly unsteady but no LOB  More Ambulation?: No  Stairs/Curb  Stairs?: No     Balance  Sitting - Static: Fair;+ (SBA)  Sitting - Dynamic: Fair;+ (SBA)  Standing - Static: Fair (CGA)  Standing - Dynamic: Fair (CGA)         AM-PAC Score  AM-PAC Inpatient Mobility Raw Score : 18 (03/07/23 1230)  AM-PAC Inpatient T-Scale Score : 43.63 (03/07/23 1230)  Mobility Inpatient CMS 0-100% Score: 46.58 (03/07/23 1230)  Mobility Inpatient CMS G-Code Modifier : CK (03/07/23 1230)        Goals  Short Term Goals  Time Frame for Short Term Goals: 2-3 days  Short Term Goal 1: mod-I bed mobility  Short Term Goal 2: transfers with supervision  Short Term Goal 3: ambulate with supervision at least 50ft  Short Term Goal 4: negotiate at least one curb sized step with SBA         Therapy Time   Individual Concurrent Group Co-treatment   Time In 0839         Time Out 0910         Minutes 31         Timed Code Treatment Minutes: [address], PT

## 2023-03-07 NOTE — PROGRESS NOTES
Nutrition Note    Diet for HD provided at bedside for family's reference with emphasis on Low Na and Fluid restriction.    Electronically signed by Kylie Trent RD, LD on 3/7/23 at 3:19 PM EST    Contact: [phone]

## 2023-03-07 NOTE — PROGRESS NOTES
Writer spoke to pt and spouse through an interpretor machine. Both pt and spouse spoke Hebrew. Both were receptive to prayer. Writer was an active listener and ministered through prayer.        03/07/23 1513   Encounter Summary   Encounter Overview/Reason  Spiritual/Emotional Needs   Service Provided For: Patient and family together   Referral/Consult From: Rounding   Last Encounter  03/07/23   Complexity of Encounter Low   Spiritual/Emotional needs   Type Spiritual Support   Assessment/Intervention/Outcome   Assessment Calm;Coping   Intervention Active listening;Prayer (assurance of)/[place];Sustaining Presence/Ministry of presence   Outcome Comfort;Coping;Receptive

## 2023-03-07 NOTE — PROGRESS NOTES
[facility]   Occupational Therapy Evaluation  Date: 3/7/23  Patient Name: Arlene Aceves       Room:   MRN: [id number]  Account: [id number]   : 1960  (62 y.o.) Gender: female     Discharge Recommendations:  Further Occupational Therapy is recommended upon facility discharge.    OT Equipment Recommendations  Equipment Needed: Yes  Mobility Devices: ADL Assistive Devices  ADL Assistive Devices: Shower Chair with back    Referring Practitioner: Idalmis Evans MD  Diagnosis: Acute kidney injury superimposed on chronic kidney disease      Treatment Diagnosis: Impaired self-care status.    Past Medical History:  has a past medical history of CAD (coronary artery disease), CHF (congestive heart failure) ([facility]), CKD (chronic kidney disease) stage 3, GFR 30-59 ml/min ([facility]), Diabetes mellitus ([facility]), Hyperkalemia, Hypertension, Microscopic hematuria, Proteinuria, Renal failure, Status post coronary artery bypass grafting, Type II or unspecified type diabetes mellitus without mention of complication, not stated as uncontrolled, and UTI (urinary tract infection).    Past Surgical History:   has a past surgical history that includes Cholecystectomy; Cardiac surgery; Coronary artery bypass graft; Cataract extraction, extracapsular w/ intraocular lens implant (Right, 2022); Cataract removal (Right, 2022); and IR TUNNELED CVC PLACE WO SQ PORT/PUMP > 5 YEARS (3/3/2023).    Restrictions  Restrictions/Precautions  Restrictions/Precautions: Fall Risk  Required Braces or Orthoses?: No      Vitals  Vitals  Heart Rate: 77  BP: (!) 181/79  MAP (Calculated): 113  Resp: 18  SpO2: 94 %  O2 Device: None (Room air)     Subjective  Subjective: Patient reports 1 fall \"I fell head first\" - reports she became dizzy and fell  Subjective  Pain: Patient denies pain.     23 0839    Services    Used Yes   Information Interpreted  Assessment  (OT/PT evaluation)     Provider Service [facility] Services    ID or Name [facility] #[id number]   Type of Resource Used Video Remote    Length of Time  Services Utilized (min) 30         Social/Functional History  Social/Functional History  Lives With: Spouse  Type of Home:  ([facility], [address] [place] [address], [place], [place])  Home Layout: One level  Home Access: Stairs to enter without rails  Entrance Stairs - Number of Steps: 1  Bathroom Shower/Tub: Tub/Shower unit  Bathroom Toilet: Standard  Bathroom Equipment: Grab bars in shower  Home Equipment:  (no DME)  Has the patient had two or more falls in the past year or any fall with injury in the past year?:  (reports 1 fall \"I fell head first\" - reports she became dizzy and fell)  ADL Assistance: Independent  Homemaking Assistance: Independent  Homemaking Responsibilities: Yes  Ambulation Assistance: Independent  Transfer Assistance: Independent  Active : No  Patient's  Info: Friends  Mode of Transportation: Van  Occupation: On disability  Additional Comments: Patient reports her spouse will be home with her.      Objective         Sensation  Overall Sensation Status: WFL (denies)    ADL  Feeding: Setup  Grooming: Stand by assistance  UE Bathing: Stand by assistance  LE Bathing: Minimal assistance  UE Dressing: Stand by assistance  LE Dressing: Minimal assistance  Toileting: Minimal assistance  Additional Comments: ADL scores based on skilled observations and clinical reasoning unless otherwise noted. Patient's ability to participate in self-care is currently limited due to weakness, decreased activity tolerance, and dizziness.         UE Function  LUE AROM (degrees)  LUE AROM : WFL  Left Hand AROM (degrees)  Left Hand AROM: WFL  Tone LUE  LUE Tone: Normotonic  LUE Strength  Gross LUE Strength: WFL  L Hand General: 4-/5  LUE Strength Comment: Grossly 4-/5    RUE AROM (degrees)  RUE AROM : WFL  Right Hand AROM (degrees)  Right Hand  AROM: WFL  Tone RUE  RUE Tone: Normotonic  RUE Strength  Gross RUE Strength: WFL  R Hand General: 4-/5  RUE Strength Comment: Grossly 4-/5    Hand Dominance  Hand Dominance: Right    Fine Motor Skills/Coordination  Coordination  Movements Are Fluid And Coordinated: Yes              Bed Mobility  Bed mobility  Supine to Sit: Minimal assistance  Sit to Supine:  (pt left up in chair)  Scooting: Contact guard assistance  Bed Mobility Comments: bed flat without rails. pt reported dizziness with sitting up    Balance  Balance  Sitting Balance: Stand by assistance  Standing Balance: Minimal assistance       Transfers  Transfers  Sit to stand: Minimal assistance  Stand to sit: Minimal assistance  Transfer Comments: with Minimal verbal cues for safety    Functional Mobility  Functional - Mobility Device: No device  Activity:  (around the foot of bed to recliner)  Assist Level: Minimal assistance  Functional Mobility Comments: with Minimal verbal cues for hand placement and safety    Assessment  Assessment  Performance deficits / Impairments: Decreased functional mobility , Decreased ADL status, Decreased strength, Decreased safe awareness, Decreased endurance, Decreased balance, Decreased high-level IADLs  Treatment Diagnosis: Impaired self-care status. Prognosis: Good  Decision Making: Medium Complexity  Discharge Recommendations: Patient would benefit from continued therapy after discharge    Activity Tolerance  Activity Tolerance: Patient Tolerated treatment well (reports mild dizziness - RN notified.  BP sitting EOB was 173/71 and standing was 107/53)    Safety Devices  Type of Devices: Patient at risk for falls, Left in chair, Call light within reach, Nurse notified ( present)    Patient Education  Patient Education  Education Given To: Patient  Education Provided: Role of Therapy, Plan of Care, Precautions, ADL Adaptive Strategies, Transfer Training  Education Method: Verbal  Education Outcome: Verbalized understanding, Demonstrated understanding      Functional Outcome Measures  AM-PAC Daily Activity - Inpatient   How much help is needed for putting on and taking off regular lower body clothing?: A Little  How much help is needed for bathing (which includes washing, rinsing, drying)?: A Little  How much help is needed for toileting (which includes using toilet, bedpan, or urinal)?: A Little  How much help is needed for putting on and taking off regular upper body clothing?: A Little  How much help is needed for taking care of personal grooming?: A Little  How much help for eating meals?: A Little  [facility] Inpatient Daily Activity Raw Score: 18  AM-PAC Inpatient ADL T-Scale Score : 38.66  ADL Inpatient CMS 0-100% Score: 46.65  ADL Inpatient CMS G-Code Modifier : CK       Goals  Patient Goals   Patient goals : To feel better  Short Term Goals  Time Frame for Short Term Goals: By discharge, patient will  Short Term Goal 1: perform BADLs with Supervision and Good safety  Short Term Goal 2: perfrom functional mobility and transfers during self-care with Supervision and Good safety  Short Term Goal 3: verbalize/demonstrate Good understanding of Fall Prevention Strategies to maximzie safety and independence with self-care    Plan  Occupational Therapy Plan  Times Per Week: 3-5  Times Per Day:  Once a day  Current Treatment Recommendations: Self-Care / ADL, Home management training, Strengthening, Balance training, Functional mobility training, Endurance training, Safety education & training, Patient/Caregiver education & training, Equipment evaluation, education, & procurement      OT Individual Minutes  OT Individual Minutes  Time In: 7914  Time Out: 2069  Minutes: 31  Time Code Minutes   Timed Code Treatment Minutes: 11 Minutes        Electronically signed by BEVERLY Forde on 3/7/23 at 1:14 PM EST

## 2023-03-07 NOTE — CARE COORDINATION
JANETH contacted [employer] and rescheduled patients dialysis for Thursday at the same time.      Electronically signed by MIKEY Hester on 3/7/2023 at 8:16 AM

## 2023-03-07 NOTE — PROGRESS NOTES
RN measured and assisted patient with bilateral thigh high compression stocking application, size Medium Short.    Patient stood up from laying position slowly and ambulated around side of bed to recliner chair without showing signs of or c/o dizziness or lightheadedness.    RN to communicate events to Attending for pending discharge.        Electronically signed by Michelle Catherine RN.

## 2023-03-07 NOTE — PROGRESS NOTES
Writer went over all discharge instructions, medication changes, dialysis appointment details and future appointments to schedule for outpatient follow-up with assistance of electronic . : Dav[facility] Armijos #[id number]    All questions/concerns addressed with patient through  service at this time. RN to print out dialysis information for patient to take with her including transportation time as well as appointment slot with address and phone number. Patient to call  for clothes to go home in as well as a ride. Electronically signed by Wagner Briceño RN.

## 2023-03-07 NOTE — PROGRESS NOTES
[facility] Internal Medicine  Mary Kate Harrington MD; Ct Farley MD; Katiana Carrasco MD; MD Norberto Mccloud MD; Renella Boeck, MD  [facility] Internal Medicine   [address] [facility]                 Date:   3/7/2023  Patientname:  Yadi Oliver  Date of admission:  2/28/2023 11:22 PM  MRN:   [id number]  Account:  [de-identified]  YOB: 1960  PCP:    Demetria Guerrero MD  Room:   2081/2081-01  Code Status:    Full Code      Chief Complaint:     Chief Complaint   Patient presents with    Emesis    Other       History of Present Illness:     Arlene Oliver is a 60 y.o. Non- / non  female who presents with Emesis and is admitted to the hospital for the management of Acute kidney injury superimposed on chronic kidney disease ([facility] [address] Extensive medical history of CAD, CHF, CKD 4, DM and HTN. Patient and  only speaks Thai, communicated with video . Patient is being followed closely by nephrology as outpatient due to stage IV CKD. She presented with nausea and vomiting. She presented with nausea and vomiting. Initial lab work shows CR 3.5 which is actually improved from CR 4.17 on 12/24/2023. Electrolyte imbalances noted with sodium 123, potassium 2.8, magnesium 1.5. Placement initiated in ED. Patient is also hypertensive, primary cardiologist Dr. Glory Chavira consulted and requested starting nifedipine. Troponin slightly elevated at 41 & 46. Patient denies chest pain or shortness of breath. Denies fever, chills, chest pain, cough, diarrhea, and urinary symptoms. There are no aggravating or alleviating factors. Symptoms are reported as acute, constant and moderate in severity.     Communication was done with the help of video  [id number]  Nausea is better  Vitals are stable  3/1  Patient feels that her face is swollen,  Felt like the face was puffy this morning, was hypoglycemic, vitals were stable. As per HPI rest of review of system negative. Physical exam  General : Patient alert awake in no acute distress  HEENT : Atraumatic, normocephalic , oral mucosa membrane moist,  Eyes : Extraocular movements intact  Neck : Supple, range of motion intact,  Cardiovascular : Regular rate and rhythm, no murmur appreciated  Respiratory : Bilateral clear breath sounds, no wheezing crackles appreciated  Gastrointestinal  : Abdomen soft, nontender, bowel sounds present  Extremities : No pedal edema clubbing or cyanosis present  Skin : Warm and dry, no skin lesions appreciated  Psych : Mood normal     Past Medical History:     Past Medical History:   Diagnosis Date    CAD (coronary artery disease)     CHF (congestive heart failure) ([facility])     CKD (chronic kidney disease) stage 3, GFR 30-59 ml/min ([facility])     Diabetes mellitus ([facility])     Hyperkalemia     Hypertension     Microscopic hematuria     Proteinuria     Renal failure     Status post coronary artery bypass grafting 2010    Type II or unspecified type diabetes mellitus without mention of complication, not stated as uncontrolled     UTI (urinary tract infection)         Past Surgical History:     Past Surgical History:   Procedure Laterality Date    CARDIAC SURGERY      CATARACT EXTRACTION EXTRACAPSULAR W/ INTRAOCULAR LENS IMPLANTATION Right 09/01/2022    [facility]/Complex with VisionBlue and iris stretching    CATARACT REMOVAL Right 9/1/2022    EYE CATARACT EMULSIFICATION IOL IMPLANT w/ VISION BLUE AND PERIBULBAR BLOCK performed by Ronald Curry MD at [address] [place] [address]      IR TUNNELED CATHETER PLACEMENT GREATER THAN 5 YEARS  3/3/2023    IR TUNNELED CATHETER PLACEMENT GREATER THAN 5 YEARS 3/3/2023 [facility]        Medications Prior to Admission:     Prior to Admission medications    Medication Sig Start Date End Date Taking?  Authorizing Provider   insulin glargine (LANTUS) 100 UNIT/ML injection vial Inject 20 Units into the skin daily 3/7/23  Yes Rafy May MD   carvedilol (COREG) 12.5 MG tablet Take 1 tablet by mouth 2 times daily (with meals) 3/6/23  Yes Rafy May MD   polyethylene glycol (GLYCOLAX) 17 g packet Take 17 g by mouth daily 3/7/23 4/6/23 Yes Rafy May MD   sodium bicarbonate 650 MG tablet Take 1 tablet by mouth 3 times daily 3/6/23  Yes Rafy May MD   omeprazole (PRILOSEC) 40 MG delayed release capsule TAKE 1 CAPSULE BY MOUTH ONCE DAILY FOR 90 DAYS 11/27/22  Yes Historical Provider, MD   BISACODYL 5 MG EC tablet USE AS DIRECTED 12/10/21  Yes Historical Provider, MD   Sennosides-Docusate Sodium 8.6-50 MG CAPS Take 2 tablets by mouth daily   Yes Historical Provider, MD   pravastatin (PRAVACHOL) 20 MG tablet Take 20 mg by mouth nightly   Yes Historical Provider, MD   aspirin 81 MG EC tablet Take 162 mg by mouth daily    Yes Historical Provider, MD   ferrous sulfate (IRON 325) 325 (65 Fe) MG tablet Take by mouth daily    Historical Provider, MD   vitamin D3 (CHOLECALCIFEROL) 125 MCG (5000 UT) TABS tablet 1,000 Units daily    Historical Provider, MD   TRUE METRIX BLOOD GLUCOSE TEST strip USE AS DIRECTED ONCE DAILY 10/26/20   Historical Provider, MD   TRUEplus Lancets 30G MISC USE TWICE DAILY 10/26/20   Historical Provider, MD   Blood Pressure Monitor KIT Take Bp daily 11/23/20   Rafal Cruz MD        Allergies:     Ace inhibitors    Social History:     Tobacco:    reports that she has never smoked. She has never used smokeless tobacco.  Alcohol:      reports no history of alcohol use. Drug Use:  reports no history of drug use.     Family History:     Family History   Problem Relation Age of Onset    Heart Attack Mother     Diabetes Mother     Diabetes Father        Investigations:      Laboratory Testing:  Recent Results (from the past 25 hour(s))   POC Glucose Fingerstick    Collection Time: 03/06/23  4:55 PM   Result Value Ref Range    POC Glucose 157 (H) 65 - 105 mg/dL   POC Glucose Fingerstick    Collection Time: 03/06/23  8:01 PM   Result Value Ref Range    POC Glucose 159 (H) 65 - 105 mg/dL   POC Glucose Fingerstick    Collection Time: 03/07/23  6:11 AM   Result Value Ref Range    POC Glucose 114 (H) 65 - 105 mg/dL   POC Glucose Fingerstick    Collection Time: 03/07/23 11:15 AM   Result Value Ref Range    POC Glucose 171 (H) 65 - 105 mg/dL       Imaging/Diagnostics:  No results found. Plan:     Patient status inpatient in the Progressive Unit/Step down    OREN superimposed on CKD4  -Creatinine cr 3.5; Baseline 2.0  -history of CKD  -Nephrology consulted in ED  -NS infusion  -hold diuretics/nephrotoxic medications  -monitor BMP    Hypokalemia  -K+ replaced with 1x dose  -recheck BMP in am    Hypomagnesemia  -Mag level - 1.5  - 1 gm IV replacement ordered  -monitor magnesium level daily    Hyponatremia  -Sodium level -123  -check sodium level every 4 hours for now  -IV NS at 75ml/hr per nephrology    HTN  -cardiology consulted in ED  -started on Nifedipine, hydralazine prn  -Continue home BP meds  -Monitor VS    Nausea/vomiting  -compazine prn  -consider abdominal imaging if unresolved    Nausea vomiting is better now, will give 40 p.o. potassium as well, potassium was 2.8 this morning,  If no improvement in the nausea and vomiting, by today evening will do imaging,  Sodium worse at 121, will inform nephrology,  Seen by cardiology no intervention at this point  Creatinine 3.49.      3/\2  Patient was found to be retaining 800 mL urine last night, ultrasound kidneys pending, discussed with Dr. Peggy Boeck recommended to put order for IR tunneled catheter for dialysis  Continues to be hyponatremic with sodium of 123  Patient was hypoglycemic this morning with blood sugar dropped to 50, getting 42 units of Lantus at home, will decrease it to 20 units, potassium replaced, urine culture is negative.   AST was high, patient hepatitis C positive, get ultrasound liver, patient to follow-up with GI as outpatient. 3/3  [facility] draining urine  Patient received first session of dialysis today  Sugars controlled  Outpatient dialysis slot awaited    3/4  Receiving dialysis second session with ultrafiltration today  No new issues  Urine output good  Sodium normalized  Awaiting outpatient dialysis slot  Complaining of constipation today--we will add Colace, abdomen soft, bowel sounds active on exam    3/5  Blood pressure running high today-increasing dose of Coreg  No acute issues overnight, awaiting outpatient dialysis    3/6  Patient reports no BM for last couple days-I was able to communicate with her in [facility]  Will intensify bowel regimen, Dulcolax suppository today  Void trial once she has BM  Discharge once outpatient dialysis slot obtained    3/7  Patient noted to be significantly orthostatic dropping from  lying to 107 standing  She is on fluid restriction per nephrology  Will reduce dose of Coreg  Reached out to nephrology-no further recommendations  Patient is not on any other blood pressure medications  Discussed in detail with the patient with the help of an -we will need to accept  higher blood pressures when she is lying down, and she needs to make sure she gets up slowly from the bed patient seems to understand and in agreement  Will attempt mobilizing with above interventions again anticipate discharge today    Chase Titus MD  3/7/2023  12:42 PM      Please note that this chart was generated using voice recognition Dragon dictation software. Although every effort was made to ensure the accuracy of this automated transcription, some errors in transcription may have occurred. [address]  [place] [address] [place] [address].    Phone [phone]

## 2023-03-07 NOTE — PROGRESS NOTES
Department of Internal Medicine  Nephrology Progress Note    Reason for consultation: Management of acute kidney injury and chronic kidney disease stage IV. Consulting physician: Antonio Charles MD.  Encounter was in the presence of RN as well as with the assistance of a video . Interval history:    Patient seen and examined bedside. Doing well. No concerns from nephrology standpoint at this time. Urine output 550 mL. Solis is out. Post void residual 28 mL. Dialysis set up outpatient at [place] [address] for Thursday. PT/OT were in the room and noted that patient had significant drop in blood pressure from sitting to standing. History of present illness: This is a 60 y.o. 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 I saw patient in the office last week Friday 2/24/23 and recommended dialysis but she refused at this time as she was planning to travel to [facility] to visit family in middle of March and she will be gone for 5 months. She presented to the emergency department last night with 2 episodes of nausea and vomiting associated with uncontrolled hypertension. Vital signs at presentation were remarkable for blood pressure 218/89 mmHg. Studies at presentation [2/28/2023] was remarkable for BUNs/creatinine 35/3.50 mg/dL; Sodium 123 mmol/L and potassium 2.8 mmol/L. Today, blood pressure control is better and patient has nausea but no vomiting. Appetite is decreased. She denies shortness of breath.     Scheduled Meds:   polyethylene glycol  17 g Oral Daily    carvedilol  12.5 mg Oral BID WC    sennosides-docusate sodium  2 tablet Oral Daily    insulin glargine  20 Units SubCUTAneous Daily sodium chloride flush  5-40 mL IntraVENous 2 times per day    heparin (porcine)  5,000 Units SubCUTAneous 3 times per day    aspirin  162 mg Oral Daily    insulin lispro  0-8 Units SubCUTAneous TID WC    insulin lispro  0-4 Units SubCUTAneous Nightly    ferrous sulfate  325 mg Oral Daily with breakfast    pantoprazole  40 mg Oral QAM AC    pravastatin  20 mg Oral Nightly    sodium bicarbonate  650 mg Oral TID     Continuous Infusions:   sodium chloride      dextrose       PRN Meds:.anticoagulant sodium citrate, anticoagulant sodium citrate, sodium chloride flush, sodium chloride, acetaminophen **OR** acetaminophen, glucose, dextrose bolus **OR** dextrose bolus, glucagon (rDNA), dextrose, bisacodyl, prochlorperazine, hydrALAZINE    Physical Exam:    VITALS:  BP (!) 181/79   Pulse 77   Temp 98 °F (36.7 °C) (Oral)   Resp 18   Ht 5' (1.524 m)   Wt 125 lb (56.7 kg)   SpO2 94%   BMI 24.41 kg/m²   TEMPERATURE:  Current - Temp: 98 °F (36.7 °C);  Max - Temp  Av.7 °F (37.1 °C)  Min: 98 °F (36.7 °C)  Max: 99.1 °F (37.3 °C)  RESPIRATIONS RANGE: Resp  Av.8  Min: 16  Max: 20  PULSE RANGE: Pulse  Av.6  Min: 63  Max: 91  BLOOD PRESSURE RANGE:  Systolic (60GQW), TZN:575 , Min:83 , VVM:109 ; Diastolic (22VUF), QQO:80, Min:50, Max:96  PULSE OXIMETRY RANGE: SpO2  Av %  Min: 93 %  Max: 95 %  24HR INTAKE/OUTPUT:    Intake/Output Summary (Last 24 hours) at 3/7/2023 0754  Last data filed at 3/7/2023 3846  Gross per 24 hour   Intake 1320 ml   Output 2278 ml   Net -958 ml     Constitutional: alert, appears stated age, and cooperative    Skin: Skin color, texture, turgor normal. No rashes or lesions    Head: Normocephalic, without obvious abnormality, atraumatic     Cardiovascular/Edema: regular rate and rhythm, S1, S2 normal, no murmur, click, rub or gallop    Respiratory: Lungs: clear to auscultation bilaterally    Abdomen: soft, non-tender; bowel sounds normal; no masses,  no organomegaly    Back: symmetric, no curvature. ROM normal. No CVA tenderness. Extremities: edema none    Neuro:  Grossly normal    CBC:   Recent Labs     03/06/23  0841   WBC 6.7   HGB 10.1*        BMP:    Recent Labs     03/06/23  0841      K 4.2   CL 98   CO2 27   BUN 36*   CREATININE 4.15*   GLUCOSE 191*     Lab Results   Component Value Date/Time    NITRU NEGATIVE 03/02/2023 09:19 AM    COLORU Yellow 03/02/2023 09:19 AM    PHUR 5.0 03/02/2023 09:19 AM    WBCUA 0 TO 2 03/02/2023 09:19 AM    RBCUA 0 TO 2 03/02/2023 09:19 AM    MUCUS 1+ 03/27/2019 08:01 AM    TRICHOMONAS NOT REPORTED 03/27/2019 08:01 AM    YEAST NOT REPORTED 03/27/2019 08:01 AM    BACTERIA None 03/02/2023 09:19 AM    CLARITYU Clear 12/01/2020 12:00 AM    SPECGRAV 1.008 03/02/2023 09:19 AM    LEUKOCYTESUR NEGATIVE 03/02/2023 09:19 AM    UROBILINOGEN Normal 03/02/2023 09:19 AM    BILIRUBINUR NEGATIVE 03/02/2023 09:19 AM    BILIRUBINUR NEGATIVE 05/09/2012 11:18 AM    BLOODU Negative 12/01/2020 12:00 AM    GLUCOSEU NEGATIVE 03/02/2023 09:19 AM    GLUCOSEU 1+ 05/09/2012 11:18 AM    KETUA NEGATIVE 03/02/2023 09:19 AM    AMORPHOUS NOT REPORTED 03/27/2019 08:01 AM     Urine Sodium:     Lab Results   Component Value Date/Time    CALIXTO 53 03/01/2023 01:28 AM     Urine Chloride:    Lab Results   Component Value Date/Time    CLUR 23 02/24/2023 12:32 PM     Urine Osmolarity:   Lab Results   Component Value Date/Time    OSMOU 190 03/01/2023 01:28 AM     Urine Creatinine:     Lab Results   Component Value Date/Time    LABCREA 57.1 03/02/2023 12:11 AM     IMPRESSION/RECOMMENDATIONS:      1. Acute kidney injury with chronic kidney disease stage IV - most consistent with Obstructive uropathy from urinary retention with top differential being progression of chronic kidney disease to stage V. Renal ultrasound showed increase cortical renal echogenicity with no hydronephrosis. S/P Right IJ tunneled catheter placement 3/3/2023 and acute dialysis initiated 3/3/2023    Plan:    Irma is out  Monitor urine output closely. Basic metabolic profile daily. 2.  Hyponatremia - hypervolemic and hypo-osmolar as suggested by serum osmolality 268 mOsm/kg. This suggest fluid retention consequent to advanced chronic kidney disease-improved with dialysis. Fluid restriction 1.5 L/day    3. Systemic hypertension - BP control is adequate. 4.  Anion gap metabolic acidosis - secondary to uremia-improved with dialysis    5. Constipation-Dulcolax suppository-voiding trial once bowel movement    6. Orthostatic hypotension per PT/OT nurse. Will leave to primary to address.  follow-up for outpatient dialysis chair. No objections to discharge once she has a confirmed outpatient chair    Prognosis is guarded. Await final plan following attestation by the attending physician, Dr. Ya Byrd. Nephrology attending. Pt seen and examined today. I have reviewed in detail  with the resident  the pertinent findings and physical exam. Furthermore, I have reviewed the elements of all parts of the encounter with the resident. I agree with assessment, diagnosis, management and plan. Patient's orthostatic hypotension Secondary to diabetic autonomic neuropathy. She may require systolic blood pressure of above 160- 170 while supine she dropped significantly on standing and was symptomatic with dizziness. Patient is on compression stockings thigh-high. May also consider abdominal binders if needed. May consider a beta-blocker like metoprolol instead of carvedilol which has alpha blocking activity. She is educated to stand up slowly from supine position.     Heber Taylor M.D, [facility]  Nephrologist      Cesar Nuñez MD   PGY-2

## 2023-03-07 NOTE — PROGRESS NOTES
CLINICAL PHARMACY NOTE: MEDS TO BEDS    Total # of Prescriptions Filled: 1   The following medications were delivered to the patient:  Sodium Bicarbonate 650mg    Additional Documentation:  Delivered Medication to Patients Room     Used electronic translating service with nursing staff

## 2023-03-07 NOTE — PROGRESS NOTES
Sodium Bicarb dropped off to room via Pharmacy Tech from [facility].  #[id number] Sunita used to verify the pt and the pt was asked if she had any questions about the medicine. Pt denied having and questions and pt signed for the med.

## 2023-03-08 NOTE — PROGRESS NOTES
Dr. Evans notified of hep C positive result. Dr. Evans spoke w/ the pt and her . Dr. Arias notified. Tra العلي notified who stated it's not issue w/ the pt going there to receive her tx since hep B is negative.

## 2023-03-10 NOTE — DISCHARGE SUMMARY
[place] [address] Internal Medicine    Discharge Summary     Patient ID: Dg Bloom  :  1960   MRN: [id number]     ACCOUNT:  [de-identified]   Patient's PCP: Kasey Souza MD  Admit Date: 2023   Discharge Date: 3/7/23  Length of Stay: 6  Code Status:  Prior  Admitting Physician: Alistair Aj MD  Discharge Physician: Rebecca Hallman MD     Active Discharge Diagnoses:     Primary Problem  Acute kidney injury superimposed on chronic kidney disease [facility] Problems    Diagnosis Date Noted    Acute kidney injury superimposed on chronic kidney disease ([facility] [N17.9, N18.9] 2023     Priority: Medium       Admission Condition:  fair     Discharged Condition: fair    Hospital Stay:     Hospital Course:  Dg Bloom is a 60 y.o. female who was admitted for the management of Acute kidney injury superimposed on chronic kidney disease ([facility] , presented to ER with Emesis and Other    25-year-old female presenting with emesis noted to have OREN on CKD  Medical history includes CAD, CHF, CKD 4, T2DM, HTN    OREN on CKD presenting creatinine 3.5, baseline 2, nephrology consulted diuretics were held-creatinine did not improve, eventually patient was started on hemodialysis via controlled catheter for ESRD  Urinary retention-Solis placed, voiding trial performed prior to discharge with successful  Recurrent episodes of hypoglycemia-Lantus and sliding scale adjusted  Hepatitis C positive-advised to follow-up with GI outpatient  Hyponatremia  Hypokalemia-replace  Hypomagnesemia-replaced  Hypertensive urgency-cardiology consulted, patient started on nifedipine  Orthostatic hypotension and dizziness after dialysis- managed by adjusting antihypertensive medication, and compression socks  Troponin slightly elevated 41, 46 likely due to OREN OREN    Significant therapeutic interventions: As above    Significant Diagnostic Studies:   Labs / Micro:    Lab Results   Component Value Date    WBC 6.7 03/06/2023    HGB 10.1 (L) 03/06/2023    HCT 32.0 (L) 03/06/2023    MCV 82.5 03/06/2023     03/06/2023          Lab Results   Component Value Date/Time     03/06/2023 08:41 AM    K 4.2 03/06/2023 08:41 AM    CL 98 03/06/2023 08:41 AM    CO2 27 03/06/2023 08:41 AM    BUN 36 03/06/2023 08:41 AM    CREATININE 4.15 03/06/2023 08:41 AM    GLUCOSE 191 03/06/2023 08:41 AM    GLUCOSE 320 05/09/2012 11:18 AM    CALCIUM 8.4 03/06/2023 08:41 AM          Radiology:    US LIVER    Result Date: 3/2/2023  EXAMINATION: RIGHT UPPER QUADRANT ULTRASOUND 3/2/2023 9:07 am COMPARISON: None. HISTORY: ORDERING SYSTEM PROVIDED HISTORY: hep c postive TECHNOLOGIST PROVIDED HISTORY: hep c postive FINDINGS: LIVER:  The liver demonstrates normal echogenicity without evidence of intrahepatic biliary ductal dilatation. Hepatopetal flow portal vein. Liver 13.4 cm in length. BILIARY SYSTEM:  Prior cholecystectomy Common bile duct is within normal limits measuring 4.1 mm. RIGHT KIDNEY: The right kidney is grossly unremarkable without evidence of hydronephrosis. PANCREAS:  Visualized portions of the pancreas are unremarkable. OTHER: No evidence of right upper quadrant ascites. Prior cholecystectomy with otherwise unremarkable exam     XR CHEST PORTABLE    Result Date: 3/2/2023  EXAMINATION: ONE XRAY VIEW OF THE CHEST 3/2/2023 1:35 pm COMPARISON: 03/27/2019 HISTORY: ORDERING SYSTEM PROVIDED HISTORY: for outpt dialysis, make sure she doesn't have tb TECHNOLOGIST PROVIDED HISTORY: for outpt dialysis, make sure she doesn't have tb Reason for Exam: for outpt dialysis, make sure she doesn't have tb FINDINGS: Chest radiograph: The cardiomediastinal silhouette and hilar contours are normal. The lungs are clear with no focal consolidation, pleural effusion or pneumothorax. The overlying soft tissue and osseous structures do not demonstrate acute abnormality. Status post sternotomy. No acute intrathoracic pathology. IR TUNNELED CVC PLACE WO SQ PORT/PUMP > 5 YEARS    Result Date: 3/3/2023  PROCEDURE: ULTRASOUND GUIDED VASCULAR ACCESS. FLUOROSCOPY GUIDED PLACEMENT OF A TUNNELED CATHETER. 3/2/2023. HISTORY: ORDERING SYSTEM PROVIDED HISTORY: hd TECHNOLOGIST PROVIDED HISTORY: hd How many lumens are being requested?->2 What side should this line be placed?->Right What site is the preferred site? ->Internal Jugular SEDATION: None FLUOROSCOPY DOSE AND TYPE: Radiation Exposure Index: 33.5 seconds; cumulative dose 2.99 mGy, TECHNIQUE: This procedure was performed by Dr. Gus Turk. Informed consent was obtained (via the  services) after a detailed explanation of the procedure including risks, benefits, and alternatives. All elements of maximal sterile barrier technique were utilized. Standard timeout was performed using two patient identifiers. The  patient was pretreated with intravenous antibiotics. Initial ultrasound evaluation shows the right internal jugular vein to be patent and accessible. Static image was obtained for the patient's medical record. The right neck and chest were prepped and draped in standard fashion. Using realtime ultrasound guidance the right internal jugular vein was accessed with a micropuncture needle and guidewire advanced centrally. Transitional dilator was placed which allowed placement of an 0.035 guidewire which had its tip directed under fluoroscopy to the inferior vena cava. A subcutaneous tunnel was created in the right upper chest from the infraclavicular region to the venotomy site. 14.5 British 19 cm palindrome tunneled dialysis catheter was pulled through the tunnel with the cuff placed in the midportion of the subcutaneous tunnel. The venotomy site was dilated over the guidewire and a peel-away sheath placed.   Tunneled catheter was advanced through the peel-away sheath and had its tip directed under fluoroscopy to the region of the proximal right atrium. Both lumen are widely patent on completion and were locked with heparin. The catheter was sutured in place and a sterile dressing applied. There are no immediate complications. The patient left the department in stable condition. EBL: Less than 3 mL FINDINGS: Fluoroscopic image demonstrates the tip of the catheter in the proximal right atrium. Successful ultrasound and fluoroscopy guided tunneled catheter placement . US RETROPERITONEAL COMPLETE    Result Date: 3/2/2023  EXAMINATION: RETROPERITONEAL ULTRASOUND OF THE KIDNEYS AND URINARY BLADDER 3/2/2023 COMPARISON: 01/14/2011 HISTORY: ORDERING SYSTEM PROVIDED HISTORY: Acute kidney injury TECHNOLOGIST PROVIDED HISTORY: Acute kidney injury 79-year-old female with acute kidney injury FINDINGS: Kidneys: Right kidney measures 9.3 x 4.7 x 4.6 cm. Right renal cortical thickness measures 9 mm. Left kidney measures 9.8 x 4.5 x 4.4 cm. Left renal cortical thickness measures 1.2 cm. Increased echogenicity of the bilateral renal cortex. No perinephric fluid or hydronephrosis. No echogenic foci with posterior acoustic shadowing to suggest renal calculi. Bladder: Not imaged. 1. Findings above consistent with chronic medical renal disease. 2. No hydronephrosis. FLUORO FOR SURGICAL PROCEDURES    Result Date: 3/3/2023  Radiology exam is complete. No Radiologist dictation. Please follow up with ordering provider. Consultations:    Consults:     Final Specialist Recommendations/Findings:   IP CONSULT TO NEPHROLOGY  IP CONSULT TO CARDIOLOGY  IP CONSULT TO UROLOGY      The patient was seen and examined on day of discharge and this discharge summary is in conjunction with any daily progress note from day of discharge.     Discharge plan:     Disposition: home      Instructions to Patient: Keep follow-up appointments      Requiring Further Evaluation/Follow Up POST HOSPITALIZATION/Incidental Findings:     Physician Follow Up:     Nick Pabon Regi Scruggs, [address] [facility]  [phone]    Schedule an appointment as soon as possible for a visit in 2 week(s)      [facility] [place] [address] [phone]    Schedule an appointment as soon as possible for a visit in 2 week(s)         Activity: Resume as directed    Discharge Medications:      Medication List        CHANGE how you take these medications      Basaglar KwikPen 100 UNIT/ML injection pen  Generic drug: insulin glargine  Inject 20 Units into the skin daily  What changed: how much to take     carvedilol 3.125 MG tablet  Commonly known as: COREG  Take 1 tablet by mouth 2 times daily (with meals)  What changed:   medication strength  how much to take  when to take this     sodium bicarbonate 650 MG tablet  Take 1 tablet by mouth 3 times daily  What changed: when to take this            CONTINUE taking these medications      aspirin 81 MG EC tablet     bisacodyl 5 MG EC tablet  Generic drug: bisacodyl     Blood Pressure Monitor Kit  Take Bp daily     ferrous sulfate 325 (65 Fe) MG tablet  Commonly known as: IRON 325     omeprazole 40 MG delayed release capsule  Commonly known as: PRILOSEC     pravastatin 20 MG tablet  Commonly known as: PRAVACHOL     Sennosides-Docusate Sodium 8.6-50 MG Caps     True Metrix Blood Glucose Test strip  Generic drug: blood glucose test strips     TRUEplus Lancets 30G Misc     vitamin D3 125 MCG (5000 UT) Tabs tablet  Commonly known as: CHOLECALCIFEROL            STOP taking these medications      magnesium oxide 400 (241.3 Mg) MG Tabs tablet  Commonly known as: MAG-OX     potassium chloride 20 MEQ extended release tablet  Commonly known as: KLOR-CON M     ramipril 2.5 MG capsule  Commonly known as: ALTACE               Where to Get Your Medications        These medications were sent to [facility] [address] [place], [facility] [place] [address] Phone: [phone]   Nicole Pérez 100 UNIT/ML injection pen  carvedilol 3.125 MG tablet  sodium bicarbonate 650 MG tablet         Time Spent on discharge is  35 mins in patient examination, evaluation, counseling as well as medication reconciliation, prescriptions for required medications, discharge plan and follow up. Electronically signed by   Shikha Hodgson MD  3/10/2023  7:00 AM      Thank you Dr. Matthias Rascon MD for the opportunity to be involved in this patient's care.

## (undated) DEVICE — GAUZE,SPONGE,4"X4",16PLY,XRAY,STRL,LF: Brand: MEDLINE

## (undated) DEVICE — CLEARCUT® SIDEPORT KNIFE DUAL BEVEL 1.0MM ANGLED: Brand: CLEARCUT®

## (undated) DEVICE — GOWN,AURORA,NONREINFORCED,LARGE: Brand: MEDLINE

## (undated) DEVICE — TOWEL,OR,DSP,ST,WHITE,DLX,2/PK,40PK/CS: Brand: MEDLINE

## (undated) DEVICE — SOLUTION PREP POVIDONE IOD FOR SKIN MUCOUS MEM PRIOR TO

## (undated) DEVICE — SHIELD EYE PLAS CATRCT PT CARE

## (undated) DEVICE — LABEL MED EYE STRL

## (undated) DEVICE — PREMIUM DRY TRAY LF: Brand: MEDLINE INDUSTRIES, INC.

## (undated) DEVICE — THE MONARCH® "D" CARTRIDGE IS A SINGLE-USE POLYPROPYLENE CARTRIDGE FOR POSTERIOR CHAMBER IOL DELIVERY: Brand: MONARCH® III

## (undated) DEVICE — SURGICAL PROCEDURE PACK LT EYE CUST ST CHARLES STRL LF DISP

## (undated) DEVICE — GLOVE ORANGE PI 7 1/2   MSG9075

## (undated) DEVICE — SOLUTION IRRIGATION BAL SALT SOLUTION 500 ML STRL BSS

## (undated) DEVICE — COVER,MAYO STAND,STERILE: Brand: MEDLINE